# Patient Record
Sex: FEMALE | Race: WHITE | NOT HISPANIC OR LATINO | Employment: OTHER | URBAN - METROPOLITAN AREA
[De-identification: names, ages, dates, MRNs, and addresses within clinical notes are randomized per-mention and may not be internally consistent; named-entity substitution may affect disease eponyms.]

---

## 2021-01-24 PROBLEM — J90 PLEURAL EFFUSION ON RIGHT: Status: ACTIVE | Noted: 2021-01-24

## 2021-01-24 PROBLEM — Z85.3 HISTORY OF BREAST CANCER: Status: ACTIVE | Noted: 2021-01-24

## 2021-01-24 PROBLEM — J98.19 TRAPPED LUNG: Status: ACTIVE | Noted: 2021-01-24

## 2021-01-24 PROBLEM — Z71.89 ADVANCE CARE PLANNING: Status: ACTIVE | Noted: 2021-01-24

## 2021-01-24 PROBLEM — Z77.090 ASBESTOS EXPOSURE: Status: ACTIVE | Noted: 2021-01-24

## 2021-01-27 PROBLEM — J91.0 MALIGNANT PLEURAL EFFUSION: Status: ACTIVE | Noted: 2021-01-27

## 2021-01-27 PROBLEM — C78.2: Status: ACTIVE | Noted: 2021-01-27

## 2021-01-28 PROBLEM — Z98.890 S/P THORACOTOMY: Status: ACTIVE | Noted: 2021-01-28

## 2021-01-29 PROBLEM — C45.0 MALIGNANT MESOTHELIOMA OF PLEURA: Status: ACTIVE | Noted: 2021-01-29

## 2021-02-01 ENCOUNTER — TELEPHONE (OUTPATIENT)
Dept: HEMATOLOGY/ONCOLOGY | Facility: CLINIC | Age: 76
End: 2021-02-01

## 2021-02-01 DIAGNOSIS — C45.0 MESOTHELIOMA OF PLEURA: Primary | ICD-10-CM

## 2021-02-03 ENCOUNTER — TELEPHONE (OUTPATIENT)
Dept: HEMATOLOGY/ONCOLOGY | Facility: CLINIC | Age: 76
End: 2021-02-03

## 2021-02-05 ENCOUNTER — HOSPITAL ENCOUNTER (OUTPATIENT)
Dept: PULMONOLOGY | Facility: CLINIC | Age: 76
Discharge: HOME | End: 2021-02-05
Payer: MEDICARE

## 2021-02-05 ENCOUNTER — HOSPITAL ENCOUNTER (OUTPATIENT)
Dept: RADIOLOGY | Facility: HOSPITAL | Age: 76
Discharge: HOME | End: 2021-02-05
Attending: PHYSICIAN ASSISTANT
Payer: MEDICARE

## 2021-02-05 ENCOUNTER — DOCUMENTATION ONLY (OUTPATIENT)
Dept: HEMATOLOGY/ONCOLOGY | Facility: CLINIC | Age: 76
End: 2021-02-05

## 2021-02-05 ENCOUNTER — OFFICE VISIT (OUTPATIENT)
Dept: CARDIOTHORACIC SURGERY | Facility: CLINIC | Age: 76
End: 2021-02-05
Payer: MEDICARE

## 2021-02-05 VITALS
BODY MASS INDEX: 23.68 KG/M2 | HEART RATE: 117 BPM | WEIGHT: 138.69 LBS | HEIGHT: 64 IN | OXYGEN SATURATION: 94 % | SYSTOLIC BLOOD PRESSURE: 145 MMHG | DIASTOLIC BLOOD PRESSURE: 81 MMHG

## 2021-02-05 DIAGNOSIS — Z13.9 SCREENING PROCEDURE: Primary | ICD-10-CM

## 2021-02-05 DIAGNOSIS — C45.9 EPITHELIOID MESOTHELIOMA: Primary | ICD-10-CM

## 2021-02-05 DIAGNOSIS — R06.00 DYSPNEA: ICD-10-CM

## 2021-02-05 DIAGNOSIS — R06.09 DYSPNEA ON EXERTION: ICD-10-CM

## 2021-02-05 DIAGNOSIS — C45.0 MESOTHELIOMA OF PLEURA: ICD-10-CM

## 2021-02-05 LAB
POCT GLUCOSE: 109 MG/DL (ref 70–110)
SARS-COV-2 RDRP RESP QL NAA+PROBE: NEGATIVE

## 2021-02-05 PROCEDURE — 78815 PET IMAGE W/CT SKULL-THIGH: CPT | Mod: TC

## 2021-02-05 PROCEDURE — 78815 NM PET CT ROUTINE: ICD-10-PCS | Mod: 26,PI,, | Performed by: RADIOLOGY

## 2021-02-05 PROCEDURE — 1126F PR PAIN SEVERITY QUANTIFIED, NO PAIN PRESENT: ICD-10-PCS | Mod: S$GLB,,, | Performed by: THORACIC SURGERY (CARDIOTHORACIC VASCULAR SURGERY)

## 2021-02-05 PROCEDURE — 1159F MED LIST DOCD IN RCRD: CPT | Mod: S$GLB,,, | Performed by: THORACIC SURGERY (CARDIOTHORACIC VASCULAR SURGERY)

## 2021-02-05 PROCEDURE — 94729 PR C02/MEMBANE DIFFUSE CAPACITY: ICD-10-PCS | Mod: S$GLB,,, | Performed by: INTERNAL MEDICINE

## 2021-02-05 PROCEDURE — 3008F BODY MASS INDEX DOCD: CPT | Mod: CPTII,S$GLB,, | Performed by: THORACIC SURGERY (CARDIOTHORACIC VASCULAR SURGERY)

## 2021-02-05 PROCEDURE — 1126F AMNT PAIN NOTED NONE PRSNT: CPT | Mod: S$GLB,,, | Performed by: THORACIC SURGERY (CARDIOTHORACIC VASCULAR SURGERY)

## 2021-02-05 PROCEDURE — 3008F PR BODY MASS INDEX (BMI) DOCUMENTED: ICD-10-PCS | Mod: CPTII,S$GLB,, | Performed by: THORACIC SURGERY (CARDIOTHORACIC VASCULAR SURGERY)

## 2021-02-05 PROCEDURE — 99999 PR PBB SHADOW E&M-EST. PATIENT-LVL IV: CPT | Mod: PBBFAC,,, | Performed by: THORACIC SURGERY (CARDIOTHORACIC VASCULAR SURGERY)

## 2021-02-05 PROCEDURE — 3288F PR FALLS RISK ASSESSMENT DOCUMENTED: ICD-10-PCS | Mod: CPTII,S$GLB,, | Performed by: THORACIC SURGERY (CARDIOTHORACIC VASCULAR SURGERY)

## 2021-02-05 PROCEDURE — 1101F PT FALLS ASSESS-DOCD LE1/YR: CPT | Mod: CPTII,S$GLB,, | Performed by: THORACIC SURGERY (CARDIOTHORACIC VASCULAR SURGERY)

## 2021-02-05 PROCEDURE — 94060 EVALUATION OF WHEEZING: CPT | Mod: S$GLB,,, | Performed by: INTERNAL MEDICINE

## 2021-02-05 PROCEDURE — 78815 PET IMAGE W/CT SKULL-THIGH: CPT | Mod: 26,PI,, | Performed by: RADIOLOGY

## 2021-02-05 PROCEDURE — 99205 OFFICE O/P NEW HI 60 MIN: CPT | Mod: S$GLB,,, | Performed by: THORACIC SURGERY (CARDIOTHORACIC VASCULAR SURGERY)

## 2021-02-05 PROCEDURE — 3288F FALL RISK ASSESSMENT DOCD: CPT | Mod: CPTII,S$GLB,, | Performed by: THORACIC SURGERY (CARDIOTHORACIC VASCULAR SURGERY)

## 2021-02-05 PROCEDURE — 99205 PR OFFICE/OUTPT VISIT, NEW, LEVL V, 60-74 MIN: ICD-10-PCS | Mod: S$GLB,,, | Performed by: THORACIC SURGERY (CARDIOTHORACIC VASCULAR SURGERY)

## 2021-02-05 PROCEDURE — 94727 GAS DIL/WSHOT DETER LNG VOL: CPT | Mod: S$GLB,,, | Performed by: INTERNAL MEDICINE

## 2021-02-05 PROCEDURE — U0002 COVID-19 LAB TEST NON-CDC: HCPCS

## 2021-02-05 PROCEDURE — 1101F PR PT FALLS ASSESS DOC 0-1 FALLS W/OUT INJ PAST YR: ICD-10-PCS | Mod: CPTII,S$GLB,, | Performed by: THORACIC SURGERY (CARDIOTHORACIC VASCULAR SURGERY)

## 2021-02-05 PROCEDURE — 99999 PR PBB SHADOW E&M-EST. PATIENT-LVL IV: ICD-10-PCS | Mod: PBBFAC,,, | Performed by: THORACIC SURGERY (CARDIOTHORACIC VASCULAR SURGERY)

## 2021-02-05 PROCEDURE — 94060 PR EVAL OF BRONCHOSPASM: ICD-10-PCS | Mod: S$GLB,,, | Performed by: INTERNAL MEDICINE

## 2021-02-05 PROCEDURE — 94729 DIFFUSING CAPACITY: CPT | Mod: S$GLB,,, | Performed by: INTERNAL MEDICINE

## 2021-02-05 PROCEDURE — 94727 PR PULM FUNCTION TEST BY GAS: ICD-10-PCS | Mod: S$GLB,,, | Performed by: INTERNAL MEDICINE

## 2021-02-05 PROCEDURE — 1159F PR MEDICATION LIST DOCUMENTED IN MEDICAL RECORD: ICD-10-PCS | Mod: S$GLB,,, | Performed by: THORACIC SURGERY (CARDIOTHORACIC VASCULAR SURGERY)

## 2021-02-05 RX ORDER — CYCLOBENZAPRINE HCL 10 MG
10 TABLET ORAL DAILY PRN
COMMUNITY

## 2021-02-05 RX ORDER — PAROXETINE HYDROCHLORIDE 20 MG/1
20 TABLET, FILM COATED ORAL
COMMUNITY
Start: 2020-09-18 | End: 2021-02-17 | Stop reason: CLARIF

## 2021-02-08 ENCOUNTER — TELEPHONE (OUTPATIENT)
Dept: HEMATOLOGY/ONCOLOGY | Facility: CLINIC | Age: 76
End: 2021-02-08

## 2021-02-09 ENCOUNTER — HOSPITAL ENCOUNTER (OUTPATIENT)
Dept: CARDIOLOGY | Facility: HOSPITAL | Age: 76
Discharge: HOME | End: 2021-02-09
Attending: PHYSICIAN ASSISTANT
Payer: MEDICARE

## 2021-02-09 VITALS
BODY MASS INDEX: 23.56 KG/M2 | DIASTOLIC BLOOD PRESSURE: 78 MMHG | SYSTOLIC BLOOD PRESSURE: 124 MMHG | HEIGHT: 64 IN | RESPIRATION RATE: 16 BRPM | WEIGHT: 138 LBS

## 2021-02-09 DIAGNOSIS — R06.09 DYSPNEA ON EXERTION: ICD-10-CM

## 2021-02-09 DIAGNOSIS — C45.9 EPITHELIOID MESOTHELIOMA: ICD-10-CM

## 2021-02-09 LAB
ASCENDING AORTA: 2.94 CM
BSA FOR ECHO PROCEDURE: 1.68 M2
CV ECHO LV RWT: 0.34 CM
CV STRESS BASE HR: 81 BPM
DIASTOLIC BLOOD PRESSURE: 71 MMHG
DOP CALC LVOT AREA: 2.7 CM2
DOP CALC LVOT DIAMETER: 1.85 CM
DOP CALC LVOT PEAK VEL: 1.22 M/S
DOP CALC LVOT STROKE VOLUME: 58.76 CM3
DOP CALCLVOT PEAK VEL VTI: 21.87 CM
E WAVE DECELERATION TIME: 241.1 MSEC
E/A RATIO: 0.77
E/E' RATIO: 8.8 M/S
ECHO LV POSTERIOR WALL: 0.7 CM (ref 0.6–1.1)
FRACTIONAL SHORTENING: 35 % (ref 28–44)
INTERVENTRICULAR SEPTUM: 0.71 CM (ref 0.6–1.1)
IVRT: 71.36 MSEC
LA MAJOR: 4.45 CM
LA MINOR: 4.5 CM
LA WIDTH: 2.62 CM
LEFT ATRIUM SIZE: 2.74 CM
LEFT ATRIUM VOLUME INDEX: 16.4 ML/M2
LEFT ATRIUM VOLUME: 27.31 CM3
LEFT INTERNAL DIMENSION IN SYSTOLE: 2.66 CM (ref 2.1–4)
LEFT VENTRICLE DIASTOLIC VOLUME INDEX: 44.52 ML/M2
LEFT VENTRICLE DIASTOLIC VOLUME: 74.35 ML
LEFT VENTRICLE MASS INDEX: 49 G/M2
LEFT VENTRICLE SYSTOLIC VOLUME INDEX: 15.6 ML/M2
LEFT VENTRICLE SYSTOLIC VOLUME: 26.11 ML
LEFT VENTRICULAR INTERNAL DIMENSION IN DIASTOLE: 4.1 CM (ref 3.5–6)
LEFT VENTRICULAR MASS: 82.44 G
LV LATERAL E/E' RATIO: 8.25 M/S
LV SEPTAL E/E' RATIO: 9.43 M/S
MV A" WAVE DURATION": 10.56 MSEC
MV PEAK A VEL: 0.86 M/S
MV PEAK E VEL: 0.66 M/S
OHS CV CPX 1 MINUTE RECOVERY HEART RATE: 120 BPM
OHS CV CPX 85 PERCENT MAX PREDICTED HEART RATE MALE: 120
OHS CV CPX MAX PREDICTED HEART RATE: 141
OHS CV CPX PATIENT IS FEMALE: 1
OHS CV CPX PATIENT IS MALE: 0
OHS CV CPX PEAK DIASTOLIC BLOOD PRESSURE: 47 MMHG
OHS CV CPX PEAK HEAR RATE: 125 BPM
OHS CV CPX PEAK RATE PRESSURE PRODUCT: NORMAL
OHS CV CPX PEAK SYSTOLIC BLOOD PRESSURE: 166 MMHG
OHS CV CPX PERCENT MAX PREDICTED HEART RATE ACHIEVED: 89
OHS CV CPX RATE PRESSURE PRODUCT PRESENTING: 9882
PISA TR MAX VEL: 2.21 M/S
PULM VEIN S/D RATIO: 2.68
PV PEAK D VEL: 0.34 M/S
PV PEAK S VEL: 0.91 M/S
RA MAJOR: 3.89 CM
RA PRESSURE: 3 MMHG
RA WIDTH: 2.2 CM
RIGHT VENTRICULAR END-DIASTOLIC DIMENSION: 2.87 CM
RV TISSUE DOPPLER FREE WALL SYSTOLIC VELOCITY 1 (APICAL 4 CHAMBER VIEW): 8.95 CM/S
SINUS: 2.84 CM
STJ: 2.25 CM
SYSTOLIC BLOOD PRESSURE: 122 MMHG
TDI LATERAL: 0.08 M/S
TDI SEPTAL: 0.07 M/S
TDI: 0.08 M/S
TR MAX PG: 20 MMHG
TRICUSPID ANNULAR PLANE SYSTOLIC EXCURSION: 1.96 CM
TV REST PULMONARY ARTERY PRESSURE: 23 MMHG

## 2021-02-09 PROCEDURE — 93351 STRESS TTE COMPLETE: CPT

## 2021-02-09 PROCEDURE — 93351 STRESS ECHO (CUPID ONLY): ICD-10-PCS | Mod: 26,,, | Performed by: INTERNAL MEDICINE

## 2021-02-09 PROCEDURE — 63600175 PHARM REV CODE 636 W HCPCS: Performed by: PHYSICIAN ASSISTANT

## 2021-02-09 PROCEDURE — 93351 STRESS TTE COMPLETE: CPT | Mod: 26,,, | Performed by: INTERNAL MEDICINE

## 2021-02-09 RX ORDER — DOBUTAMINE HYDROCHLORIDE 400 MG/100ML
10 INJECTION INTRAVENOUS
Status: COMPLETED | OUTPATIENT
Start: 2021-02-09 | End: 2021-02-09

## 2021-02-09 RX ADMIN — DOBUTAMINE HYDROCHLORIDE 10 MCG/KG/MIN: 400 INJECTION INTRAVENOUS at 11:02

## 2021-02-10 ENCOUNTER — TELEPHONE (OUTPATIENT)
Dept: HEMATOLOGY/ONCOLOGY | Facility: CLINIC | Age: 76
End: 2021-02-10

## 2021-02-10 ENCOUNTER — DOCUMENTATION ONLY (OUTPATIENT)
Dept: CARDIOTHORACIC SURGERY | Facility: HOSPITAL | Age: 76
End: 2021-02-10

## 2021-02-10 ENCOUNTER — TELEPHONE (OUTPATIENT)
Dept: SURGERY | Facility: CLINIC | Age: 76
End: 2021-02-10

## 2021-02-10 DIAGNOSIS — C45.0 MESOTHELIOMA (PLEURAL): Primary | ICD-10-CM

## 2021-02-10 DIAGNOSIS — Z01.818 PRE-OP TESTING: ICD-10-CM

## 2021-02-11 ENCOUNTER — OFFICE VISIT (OUTPATIENT)
Dept: HEMATOLOGY/ONCOLOGY | Facility: CLINIC | Age: 76
End: 2021-02-11
Payer: MEDICARE

## 2021-02-11 VITALS
SYSTOLIC BLOOD PRESSURE: 116 MMHG | OXYGEN SATURATION: 99 % | WEIGHT: 138.69 LBS | TEMPERATURE: 98 F | RESPIRATION RATE: 18 BRPM | HEART RATE: 84 BPM | DIASTOLIC BLOOD PRESSURE: 60 MMHG | BODY MASS INDEX: 23.68 KG/M2 | HEIGHT: 64 IN

## 2021-02-11 DIAGNOSIS — R06.09 DYSPNEA ON EXERTION: ICD-10-CM

## 2021-02-11 DIAGNOSIS — C45.9 EPITHELIOID MESOTHELIOMA: ICD-10-CM

## 2021-02-11 PROCEDURE — 1159F PR MEDICATION LIST DOCUMENTED IN MEDICAL RECORD: ICD-10-PCS | Mod: S$GLB,,, | Performed by: INTERNAL MEDICINE

## 2021-02-11 PROCEDURE — 99205 OFFICE O/P NEW HI 60 MIN: CPT | Mod: S$GLB,,, | Performed by: INTERNAL MEDICINE

## 2021-02-11 PROCEDURE — 99999 PR PBB SHADOW E&M-EST. PATIENT-LVL IV: ICD-10-PCS | Mod: PBBFAC,,, | Performed by: INTERNAL MEDICINE

## 2021-02-11 PROCEDURE — 1101F PR PT FALLS ASSESS DOC 0-1 FALLS W/OUT INJ PAST YR: ICD-10-PCS | Mod: CPTII,S$GLB,, | Performed by: INTERNAL MEDICINE

## 2021-02-11 PROCEDURE — 99999 PR PBB SHADOW E&M-EST. PATIENT-LVL IV: CPT | Mod: PBBFAC,,, | Performed by: INTERNAL MEDICINE

## 2021-02-11 PROCEDURE — 3288F PR FALLS RISK ASSESSMENT DOCUMENTED: ICD-10-PCS | Mod: CPTII,S$GLB,, | Performed by: INTERNAL MEDICINE

## 2021-02-11 PROCEDURE — 3288F FALL RISK ASSESSMENT DOCD: CPT | Mod: CPTII,S$GLB,, | Performed by: INTERNAL MEDICINE

## 2021-02-11 PROCEDURE — 99205 PR OFFICE/OUTPT VISIT, NEW, LEVL V, 60-74 MIN: ICD-10-PCS | Mod: S$GLB,,, | Performed by: INTERNAL MEDICINE

## 2021-02-11 PROCEDURE — 3008F BODY MASS INDEX DOCD: CPT | Mod: CPTII,S$GLB,, | Performed by: INTERNAL MEDICINE

## 2021-02-11 PROCEDURE — 1101F PT FALLS ASSESS-DOCD LE1/YR: CPT | Mod: CPTII,S$GLB,, | Performed by: INTERNAL MEDICINE

## 2021-02-11 PROCEDURE — 3008F PR BODY MASS INDEX (BMI) DOCUMENTED: ICD-10-PCS | Mod: CPTII,S$GLB,, | Performed by: INTERNAL MEDICINE

## 2021-02-11 PROCEDURE — 1125F AMNT PAIN NOTED PAIN PRSNT: CPT | Mod: S$GLB,,, | Performed by: INTERNAL MEDICINE

## 2021-02-11 PROCEDURE — 1125F PR PAIN SEVERITY QUANTIFIED, PAIN PRESENT: ICD-10-PCS | Mod: S$GLB,,, | Performed by: INTERNAL MEDICINE

## 2021-02-11 PROCEDURE — 1159F MED LIST DOCD IN RCRD: CPT | Mod: S$GLB,,, | Performed by: INTERNAL MEDICINE

## 2021-02-12 ENCOUNTER — TELEPHONE (OUTPATIENT)
Dept: VASCULAR SURGERY | Facility: CLINIC | Age: 76
End: 2021-02-12

## 2021-02-17 ENCOUNTER — ANESTHESIA EVENT (OUTPATIENT)
Dept: SURGERY | Facility: HOSPITAL | Age: 76
DRG: 988 | End: 2021-02-17
Payer: MEDICARE

## 2021-02-18 ENCOUNTER — HOSPITAL ENCOUNTER (INPATIENT)
Facility: HOSPITAL | Age: 76
LOS: 1 days | Discharge: HOME | DRG: 988 | End: 2021-02-18
Attending: THORACIC SURGERY (CARDIOTHORACIC VASCULAR SURGERY) | Admitting: THORACIC SURGERY (CARDIOTHORACIC VASCULAR SURGERY)
Payer: MEDICARE

## 2021-02-18 ENCOUNTER — ANESTHESIA (OUTPATIENT)
Dept: SURGERY | Facility: HOSPITAL | Age: 76
DRG: 988 | End: 2021-02-18
Payer: MEDICARE

## 2021-02-18 VITALS
WEIGHT: 139 LBS | BODY MASS INDEX: 26.24 KG/M2 | HEIGHT: 61 IN | RESPIRATION RATE: 18 BRPM | SYSTOLIC BLOOD PRESSURE: 145 MMHG | DIASTOLIC BLOOD PRESSURE: 75 MMHG | TEMPERATURE: 98 F | HEART RATE: 79 BPM | OXYGEN SATURATION: 96 %

## 2021-02-18 DIAGNOSIS — C45.9 EPITHELIOID MESOTHELIOMA: Primary | ICD-10-CM

## 2021-02-18 DIAGNOSIS — C45.9 MESOTHELIOMA: ICD-10-CM

## 2021-02-18 LAB
ABO + RH BLD: NORMAL
BLD GP AB SCN CELLS X3 SERPL QL: NORMAL

## 2021-02-18 PROCEDURE — 63600175 PHARM REV CODE 636 W HCPCS: Performed by: THORACIC SURGERY (CARDIOTHORACIC VASCULAR SURGERY)

## 2021-02-18 PROCEDURE — 36561 INSERT TUNNELED CV CATH: CPT | Mod: RT,,, | Performed by: THORACIC SURGERY (CARDIOTHORACIC VASCULAR SURGERY)

## 2021-02-18 PROCEDURE — 63600175 PHARM REV CODE 636 W HCPCS

## 2021-02-18 PROCEDURE — 86900 BLOOD TYPING SEROLOGIC ABO: CPT

## 2021-02-18 PROCEDURE — 63600175 PHARM REV CODE 636 W HCPCS: Performed by: STUDENT IN AN ORGANIZED HEALTH CARE EDUCATION/TRAINING PROGRAM

## 2021-02-18 PROCEDURE — D9220A PRA ANESTHESIA: Mod: ,,, | Performed by: ANESTHESIOLOGY

## 2021-02-18 PROCEDURE — 25000003 PHARM REV CODE 250: Performed by: STUDENT IN AN ORGANIZED HEALTH CARE EDUCATION/TRAINING PROGRAM

## 2021-02-18 PROCEDURE — 36000708 HC OR TIME LEV III 1ST 15 MIN: Performed by: THORACIC SURGERY (CARDIOTHORACIC VASCULAR SURGERY)

## 2021-02-18 PROCEDURE — 36561 PR INSERT TUNNELED CV CATH WITH PORT: ICD-10-PCS | Mod: RT,,, | Performed by: THORACIC SURGERY (CARDIOTHORACIC VASCULAR SURGERY)

## 2021-02-18 PROCEDURE — 77001 CHG FLUOROGUIDE CNTRL VEN ACCESS,PLACE,REPLACE,REMOVE: ICD-10-PCS | Mod: 26,59,, | Performed by: THORACIC SURGERY (CARDIOTHORACIC VASCULAR SURGERY)

## 2021-02-18 PROCEDURE — D9220A PRA ANESTHESIA: ICD-10-PCS | Mod: ,,, | Performed by: ANESTHESIOLOGY

## 2021-02-18 PROCEDURE — 77001 FLUOROGUIDE FOR VEIN DEVICE: CPT | Mod: 26,59,, | Performed by: THORACIC SURGERY (CARDIOTHORACIC VASCULAR SURGERY)

## 2021-02-18 PROCEDURE — 37000008 HC ANESTHESIA 1ST 15 MINUTES: Performed by: THORACIC SURGERY (CARDIOTHORACIC VASCULAR SURGERY)

## 2021-02-18 PROCEDURE — 49320 PR LAP,DIAGNOSTIC ABDOMEN: ICD-10-PCS | Mod: 51,,, | Performed by: THORACIC SURGERY (CARDIOTHORACIC VASCULAR SURGERY)

## 2021-02-18 PROCEDURE — 25000003 PHARM REV CODE 250: Performed by: THORACIC SURGERY (CARDIOTHORACIC VASCULAR SURGERY)

## 2021-02-18 PROCEDURE — C1788 PORT, INDWELLING, IMP: HCPCS | Performed by: THORACIC SURGERY (CARDIOTHORACIC VASCULAR SURGERY)

## 2021-02-18 PROCEDURE — 36000709 HC OR TIME LEV III EA ADD 15 MIN: Performed by: THORACIC SURGERY (CARDIOTHORACIC VASCULAR SURGERY)

## 2021-02-18 PROCEDURE — 49320 DIAG LAPARO SEPARATE PROC: CPT | Mod: 51,,, | Performed by: THORACIC SURGERY (CARDIOTHORACIC VASCULAR SURGERY)

## 2021-02-18 PROCEDURE — 71000033 HC RECOVERY, INTIAL HOUR: Performed by: THORACIC SURGERY (CARDIOTHORACIC VASCULAR SURGERY)

## 2021-02-18 PROCEDURE — 94761 N-INVAS EAR/PLS OXIMETRY MLT: CPT

## 2021-02-18 PROCEDURE — 71000015 HC POSTOP RECOV 1ST HR: Performed by: THORACIC SURGERY (CARDIOTHORACIC VASCULAR SURGERY)

## 2021-02-18 PROCEDURE — 12000002 HC ACUTE/MED SURGE SEMI-PRIVATE ROOM

## 2021-02-18 PROCEDURE — 37000009 HC ANESTHESIA EA ADD 15 MINS: Performed by: THORACIC SURGERY (CARDIOTHORACIC VASCULAR SURGERY)

## 2021-02-18 DEVICE — KIT POWERPORT SINGLE 8FR: Type: IMPLANTABLE DEVICE | Site: NECK | Status: FUNCTIONAL

## 2021-02-18 RX ORDER — KETAMINE HCL IN 0.9 % NACL 50 MG/5 ML
SYRINGE (ML) INTRAVENOUS
Status: DISCONTINUED | OUTPATIENT
Start: 2021-02-18 | End: 2021-02-18

## 2021-02-18 RX ORDER — CEFAZOLIN SODIUM 1 G/3ML
INJECTION, POWDER, FOR SOLUTION INTRAMUSCULAR; INTRAVENOUS
Status: DISCONTINUED | OUTPATIENT
Start: 2021-02-18 | End: 2021-02-18

## 2021-02-18 RX ORDER — HYDROMORPHONE HYDROCHLORIDE 1 MG/ML
0.2 INJECTION, SOLUTION INTRAMUSCULAR; INTRAVENOUS; SUBCUTANEOUS EVERY 5 MIN PRN
Status: DISCONTINUED | OUTPATIENT
Start: 2021-02-18 | End: 2021-02-18 | Stop reason: HOSPADM

## 2021-02-18 RX ORDER — HYDROMORPHONE HYDROCHLORIDE 1 MG/ML
INJECTION, SOLUTION INTRAMUSCULAR; INTRAVENOUS; SUBCUTANEOUS
Status: COMPLETED
Start: 2021-02-18 | End: 2021-02-18

## 2021-02-18 RX ORDER — ACETAMINOPHEN 10 MG/ML
INJECTION, SOLUTION INTRAVENOUS
Status: DISCONTINUED | OUTPATIENT
Start: 2021-02-18 | End: 2021-02-18

## 2021-02-18 RX ORDER — HEPARIN SODIUM (PORCINE) LOCK FLUSH IV SOLN 100 UNIT/ML 100 UNIT/ML
SOLUTION INTRAVENOUS
Status: DISCONTINUED | OUTPATIENT
Start: 2021-02-18 | End: 2021-02-18 | Stop reason: HOSPADM

## 2021-02-18 RX ORDER — SODIUM CHLORIDE 0.9 % (FLUSH) 0.9 %
3 SYRINGE (ML) INJECTION
Status: DISCONTINUED | OUTPATIENT
Start: 2021-02-18 | End: 2021-02-18 | Stop reason: HOSPADM

## 2021-02-18 RX ORDER — LIDOCAINE HYDROCHLORIDE 10 MG/ML
1 INJECTION, SOLUTION EPIDURAL; INFILTRATION; INTRACAUDAL; PERINEURAL ONCE
Status: COMPLETED | OUTPATIENT
Start: 2021-02-18 | End: 2021-02-18

## 2021-02-18 RX ORDER — HYDROCODONE BITARTRATE AND ACETAMINOPHEN 7.5; 325 MG/1; MG/1
1 TABLET ORAL EVERY 6 HOURS PRN
Qty: 15 TABLET | Refills: 0 | Status: SHIPPED | OUTPATIENT
Start: 2021-02-18 | End: 2021-03-31 | Stop reason: SDUPTHER

## 2021-02-18 RX ORDER — SODIUM CHLORIDE 9 MG/ML
INJECTION, SOLUTION INTRAVENOUS CONTINUOUS
Status: DISCONTINUED | OUTPATIENT
Start: 2021-02-18 | End: 2021-02-18 | Stop reason: HOSPADM

## 2021-02-18 RX ORDER — PROPOFOL 10 MG/ML
VIAL (ML) INTRAVENOUS
Status: DISCONTINUED | OUTPATIENT
Start: 2021-02-18 | End: 2021-02-18

## 2021-02-18 RX ORDER — LANOLIN ALCOHOL/MO/W.PET/CERES
CREAM (GRAM) TOPICAL DAILY
Status: ON HOLD | COMMUNITY
End: 2021-05-25 | Stop reason: HOSPADM

## 2021-02-18 RX ORDER — HYDROCODONE BITARTRATE AND ACETAMINOPHEN 7.5; 325 MG/1; MG/1
1 TABLET ORAL ONCE AS NEEDED
Status: COMPLETED | OUTPATIENT
Start: 2021-02-18 | End: 2021-02-18

## 2021-02-18 RX ORDER — ROCURONIUM BROMIDE 10 MG/ML
INJECTION, SOLUTION INTRAVENOUS
Status: DISCONTINUED | OUTPATIENT
Start: 2021-02-18 | End: 2021-02-18

## 2021-02-18 RX ORDER — BUPIVACAINE HYDROCHLORIDE 5 MG/ML
INJECTION, SOLUTION EPIDURAL; INTRACAUDAL
Status: DISCONTINUED | OUTPATIENT
Start: 2021-02-18 | End: 2021-02-18 | Stop reason: HOSPADM

## 2021-02-18 RX ORDER — HYDROCODONE BITARTRATE AND ACETAMINOPHEN 7.5; 325 MG/15ML; MG/15ML
15 SOLUTION ORAL ONCE AS NEEDED
Status: DISCONTINUED | OUTPATIENT
Start: 2021-02-18 | End: 2021-02-18

## 2021-02-18 RX ORDER — CEFAZOLIN SODIUM 1 G/3ML
2 INJECTION, POWDER, FOR SOLUTION INTRAMUSCULAR; INTRAVENOUS
Status: ACTIVE | OUTPATIENT
Start: 2021-02-18

## 2021-02-18 RX ORDER — GABAPENTIN 300 MG/1
300 CAPSULE ORAL 2 TIMES DAILY
Qty: 60 CAPSULE | Refills: 11 | Status: SHIPPED | OUTPATIENT
Start: 2021-02-18 | End: 2022-05-17 | Stop reason: SDUPTHER

## 2021-02-18 RX ORDER — FENTANYL CITRATE 50 UG/ML
INJECTION, SOLUTION INTRAMUSCULAR; INTRAVENOUS
Status: DISCONTINUED | OUTPATIENT
Start: 2021-02-18 | End: 2021-02-18

## 2021-02-18 RX ORDER — LIDOCAINE HCL/PF 100 MG/5ML
SYRINGE (ML) INTRAVENOUS
Status: DISCONTINUED | OUTPATIENT
Start: 2021-02-18 | End: 2021-02-18

## 2021-02-18 RX ORDER — DEXAMETHASONE SODIUM PHOSPHATE 4 MG/ML
INJECTION, SOLUTION INTRA-ARTICULAR; INTRALESIONAL; INTRAMUSCULAR; INTRAVENOUS; SOFT TISSUE
Status: DISCONTINUED | OUTPATIENT
Start: 2021-02-18 | End: 2021-02-18

## 2021-02-18 RX ADMIN — ROCURONIUM BROMIDE 50 MG: 10 INJECTION, SOLUTION INTRAVENOUS at 11:02

## 2021-02-18 RX ADMIN — HYDROMORPHONE HYDROCHLORIDE 0.2 MG: 1 INJECTION, SOLUTION INTRAMUSCULAR; INTRAVENOUS; SUBCUTANEOUS at 12:02

## 2021-02-18 RX ADMIN — PROPOFOL 150 MG: 10 INJECTION, EMULSION INTRAVENOUS at 11:02

## 2021-02-18 RX ADMIN — LIDOCAINE HYDROCHLORIDE 0.3 MG: 10 INJECTION, SOLUTION EPIDURAL; INFILTRATION; INTRACAUDAL at 08:02

## 2021-02-18 RX ADMIN — DEXAMETHASONE SODIUM PHOSPHATE 4 MG: 4 INJECTION INTRA-ARTICULAR; INTRALESIONAL; INTRAMUSCULAR; INTRAVENOUS; SOFT TISSUE at 11:02

## 2021-02-18 RX ADMIN — SODIUM CHLORIDE: 0.9 INJECTION, SOLUTION INTRAVENOUS at 08:02

## 2021-02-18 RX ADMIN — ACETAMINOPHEN 1000 MG: 10 INJECTION, SOLUTION INTRAVENOUS at 11:02

## 2021-02-18 RX ADMIN — LIDOCAINE HYDROCHLORIDE 50 MG: 20 INJECTION, SOLUTION INTRAVENOUS at 11:02

## 2021-02-18 RX ADMIN — Medication 20 MG: at 11:02

## 2021-02-18 RX ADMIN — SODIUM CHLORIDE: 0.9 INJECTION, SOLUTION INTRAVENOUS at 11:02

## 2021-02-18 RX ADMIN — HYDROMORPHONE HYDROCHLORIDE 0.2 MG: 1 INJECTION, SOLUTION INTRAMUSCULAR; INTRAVENOUS; SUBCUTANEOUS at 01:02

## 2021-02-18 RX ADMIN — CEFAZOLIN 2 G: 330 INJECTION, POWDER, FOR SOLUTION INTRAMUSCULAR; INTRAVENOUS at 11:02

## 2021-02-18 RX ADMIN — FENTANYL CITRATE 25 MCG: 50 INJECTION, SOLUTION INTRAMUSCULAR; INTRAVENOUS at 10:02

## 2021-02-18 RX ADMIN — SUGAMMADEX 200 MG: 100 INJECTION, SOLUTION INTRAVENOUS at 12:02

## 2021-02-18 RX ADMIN — HYDROCODONE BITARTRATE AND ACETAMINOPHEN 1 TABLET: 7.5; 325 TABLET ORAL at 01:02

## 2021-02-22 ENCOUNTER — PATIENT MESSAGE (OUTPATIENT)
Dept: HEMATOLOGY/ONCOLOGY | Facility: CLINIC | Age: 76
End: 2021-02-22

## 2021-02-23 ENCOUNTER — PATIENT MESSAGE (OUTPATIENT)
Dept: CARDIOTHORACIC SURGERY | Facility: CLINIC | Age: 76
End: 2021-02-23

## 2021-03-02 ENCOUNTER — OFFICE VISIT (OUTPATIENT)
Dept: HEMATOLOGY/ONCOLOGY | Facility: CLINIC | Age: 76
End: 2021-03-02
Payer: MEDICARE

## 2021-03-02 ENCOUNTER — EXTERNAL HOME HEALTH (OUTPATIENT)
Dept: HOME HEALTH SERVICES | Facility: HOSPITAL | Age: 76
End: 2021-03-02
Payer: MEDICARE

## 2021-03-02 VITALS
BODY MASS INDEX: 26.22 KG/M2 | WEIGHT: 138.88 LBS | OXYGEN SATURATION: 99 % | TEMPERATURE: 96 F | SYSTOLIC BLOOD PRESSURE: 119 MMHG | DIASTOLIC BLOOD PRESSURE: 71 MMHG | HEART RATE: 99 BPM | RESPIRATION RATE: 18 BRPM | HEIGHT: 61 IN

## 2021-03-02 DIAGNOSIS — C45.9 EPITHELIOID MESOTHELIOMA: Primary | ICD-10-CM

## 2021-03-02 PROCEDURE — 99215 OFFICE O/P EST HI 40 MIN: CPT | Mod: S$GLB,,, | Performed by: INTERNAL MEDICINE

## 2021-03-02 PROCEDURE — 3008F PR BODY MASS INDEX (BMI) DOCUMENTED: ICD-10-PCS | Mod: CPTII,S$GLB,, | Performed by: INTERNAL MEDICINE

## 2021-03-02 PROCEDURE — 99999 PR PBB SHADOW E&M-EST. PATIENT-LVL IV: ICD-10-PCS | Mod: PBBFAC,,, | Performed by: INTERNAL MEDICINE

## 2021-03-02 PROCEDURE — 99999 PR PBB SHADOW E&M-EST. PATIENT-LVL IV: CPT | Mod: PBBFAC,,, | Performed by: INTERNAL MEDICINE

## 2021-03-02 PROCEDURE — 3008F BODY MASS INDEX DOCD: CPT | Mod: CPTII,S$GLB,, | Performed by: INTERNAL MEDICINE

## 2021-03-02 PROCEDURE — 1101F PT FALLS ASSESS-DOCD LE1/YR: CPT | Mod: CPTII,S$GLB,, | Performed by: INTERNAL MEDICINE

## 2021-03-02 PROCEDURE — 1101F PR PT FALLS ASSESS DOC 0-1 FALLS W/OUT INJ PAST YR: ICD-10-PCS | Mod: CPTII,S$GLB,, | Performed by: INTERNAL MEDICINE

## 2021-03-02 PROCEDURE — 1125F PR PAIN SEVERITY QUANTIFIED, PAIN PRESENT: ICD-10-PCS | Mod: S$GLB,,, | Performed by: INTERNAL MEDICINE

## 2021-03-02 PROCEDURE — 99215 PR OFFICE/OUTPT VISIT, EST, LEVL V, 40-54 MIN: ICD-10-PCS | Mod: S$GLB,,, | Performed by: INTERNAL MEDICINE

## 2021-03-02 PROCEDURE — 1159F PR MEDICATION LIST DOCUMENTED IN MEDICAL RECORD: ICD-10-PCS | Mod: S$GLB,,, | Performed by: INTERNAL MEDICINE

## 2021-03-02 PROCEDURE — 1159F MED LIST DOCD IN RCRD: CPT | Mod: S$GLB,,, | Performed by: INTERNAL MEDICINE

## 2021-03-02 PROCEDURE — 3288F PR FALLS RISK ASSESSMENT DOCUMENTED: ICD-10-PCS | Mod: CPTII,S$GLB,, | Performed by: INTERNAL MEDICINE

## 2021-03-02 PROCEDURE — 3288F FALL RISK ASSESSMENT DOCD: CPT | Mod: CPTII,S$GLB,, | Performed by: INTERNAL MEDICINE

## 2021-03-02 PROCEDURE — 1125F AMNT PAIN NOTED PAIN PRSNT: CPT | Mod: S$GLB,,, | Performed by: INTERNAL MEDICINE

## 2021-03-02 RX ORDER — FOLIC ACID 1 MG/1
1 TABLET ORAL DAILY
Qty: 100 TABLET | Refills: 3 | Status: SHIPPED | OUTPATIENT
Start: 2021-03-02 | End: 2022-07-07 | Stop reason: SDUPTHER

## 2021-03-02 RX ORDER — HEPARIN 100 UNIT/ML
500 SYRINGE INTRAVENOUS
Status: CANCELLED | OUTPATIENT
Start: 2021-03-10

## 2021-03-02 RX ORDER — SODIUM CHLORIDE 0.9 % (FLUSH) 0.9 %
10 SYRINGE (ML) INJECTION
Status: CANCELLED | OUTPATIENT
Start: 2021-03-10

## 2021-03-02 RX ORDER — DEXAMETHASONE SODIUM PHOSPHATE 4 MG/ML
20 INJECTION, SOLUTION INTRA-ARTICULAR; INTRALESIONAL; INTRAMUSCULAR; INTRAVENOUS; SOFT TISSUE
Status: CANCELLED
Start: 2021-03-09

## 2021-03-02 RX ORDER — PALONOSETRON 0.05 MG/ML
0.25 INJECTION, SOLUTION INTRAVENOUS ONCE
Status: CANCELLED
Start: 2021-03-09 | End: 2021-03-09

## 2021-03-08 ENCOUNTER — CLINICAL SUPPORT (OUTPATIENT)
Dept: HEMATOLOGY/ONCOLOGY | Facility: CLINIC | Age: 76
End: 2021-03-08
Payer: MEDICARE

## 2021-03-08 ENCOUNTER — DOCUMENTATION ONLY (OUTPATIENT)
Dept: INFUSION THERAPY | Facility: HOSPITAL | Age: 76
End: 2021-03-08

## 2021-03-08 VITALS
HEART RATE: 88 BPM | TEMPERATURE: 99 F | SYSTOLIC BLOOD PRESSURE: 112 MMHG | RESPIRATION RATE: 18 BRPM | BODY MASS INDEX: 26.1 KG/M2 | DIASTOLIC BLOOD PRESSURE: 57 MMHG | OXYGEN SATURATION: 98 % | HEIGHT: 61 IN | WEIGHT: 138.25 LBS

## 2021-03-08 DIAGNOSIS — C45.0 MALIGNANT MESOTHELIOMA OF PLEURA: Primary | ICD-10-CM

## 2021-03-08 PROCEDURE — 99214 OFFICE O/P EST MOD 30 MIN: CPT | Mod: S$GLB,,, | Performed by: NURSE PRACTITIONER

## 2021-03-08 PROCEDURE — 99999 PR PBB SHADOW E&M-EST. PATIENT-LVL IV: CPT | Mod: PBBFAC,,, | Performed by: NURSE PRACTITIONER

## 2021-03-08 PROCEDURE — 99999 PR PBB SHADOW E&M-EST. PATIENT-LVL IV: ICD-10-PCS | Mod: PBBFAC,,, | Performed by: NURSE PRACTITIONER

## 2021-03-08 PROCEDURE — 99214 PR OFFICE/OUTPT VISIT, EST, LEVL IV, 30-39 MIN: ICD-10-PCS | Mod: S$GLB,,, | Performed by: NURSE PRACTITIONER

## 2021-03-08 RX ORDER — HYDROCHLOROTHIAZIDE 12.5 MG/1
CAPSULE ORAL
Status: ON HOLD | COMMUNITY
Start: 2021-03-08 | End: 2021-05-25 | Stop reason: HOSPADM

## 2021-03-08 RX ORDER — PROCHLORPERAZINE MALEATE 10 MG
10 TABLET ORAL EVERY 6 HOURS PRN
Qty: 30 TABLET | Refills: 6 | Status: SHIPPED | OUTPATIENT
Start: 2021-03-08 | End: 2022-03-08

## 2021-03-08 RX ORDER — LIDOCAINE AND PRILOCAINE 25; 25 MG/G; MG/G
CREAM TOPICAL
Qty: 30 G | Refills: 2 | Status: SHIPPED | OUTPATIENT
Start: 2021-03-08

## 2021-03-09 ENCOUNTER — DOCUMENTATION ONLY (OUTPATIENT)
Dept: INFUSION THERAPY | Facility: HOSPITAL | Age: 76
End: 2021-03-09

## 2021-03-09 ENCOUNTER — NUTRITION (OUTPATIENT)
Dept: INFUSION THERAPY | Facility: HOSPITAL | Age: 76
End: 2021-03-09
Attending: INTERNAL MEDICINE
Payer: MEDICARE

## 2021-03-09 DIAGNOSIS — C45.0 MALIGNANT MESOTHELIOMA OF PLEURA: Primary | ICD-10-CM

## 2021-03-10 ENCOUNTER — DOCUMENTATION ONLY (OUTPATIENT)
Dept: INFUSION THERAPY | Facility: HOSPITAL | Age: 76
End: 2021-03-10

## 2021-03-10 ENCOUNTER — INFUSION (OUTPATIENT)
Dept: INFUSION THERAPY | Facility: HOSPITAL | Age: 76
End: 2021-03-10
Attending: INTERNAL MEDICINE
Payer: MEDICARE

## 2021-03-10 ENCOUNTER — OFFICE VISIT (OUTPATIENT)
Dept: HEMATOLOGY/ONCOLOGY | Facility: CLINIC | Age: 76
End: 2021-03-10
Payer: MEDICARE

## 2021-03-10 VITALS
SYSTOLIC BLOOD PRESSURE: 92 MMHG | OXYGEN SATURATION: 98 % | RESPIRATION RATE: 18 BRPM | BODY MASS INDEX: 25.89 KG/M2 | TEMPERATURE: 97 F | HEART RATE: 100 BPM | HEIGHT: 61 IN | DIASTOLIC BLOOD PRESSURE: 59 MMHG | WEIGHT: 137.13 LBS

## 2021-03-10 VITALS
SYSTOLIC BLOOD PRESSURE: 116 MMHG | HEIGHT: 61 IN | RESPIRATION RATE: 18 BRPM | OXYGEN SATURATION: 98 % | DIASTOLIC BLOOD PRESSURE: 72 MMHG | BODY MASS INDEX: 25.89 KG/M2 | TEMPERATURE: 97 F | WEIGHT: 137.13 LBS | HEART RATE: 94 BPM

## 2021-03-10 DIAGNOSIS — C45.0 MALIGNANT MESOTHELIOMA OF PLEURA: Primary | ICD-10-CM

## 2021-03-10 PROCEDURE — 1159F PR MEDICATION LIST DOCUMENTED IN MEDICAL RECORD: ICD-10-PCS | Mod: S$GLB,,, | Performed by: INTERNAL MEDICINE

## 2021-03-10 PROCEDURE — 99214 PR OFFICE/OUTPT VISIT, EST, LEVL IV, 30-39 MIN: ICD-10-PCS | Mod: S$GLB,,, | Performed by: INTERNAL MEDICINE

## 2021-03-10 PROCEDURE — 96367 TX/PROPH/DG ADDL SEQ IV INF: CPT | Mod: PN

## 2021-03-10 PROCEDURE — 99999 PR PBB SHADOW E&M-EST. PATIENT-LVL IV: ICD-10-PCS | Mod: PBBFAC,,, | Performed by: INTERNAL MEDICINE

## 2021-03-10 PROCEDURE — 96366 THER/PROPH/DIAG IV INF ADDON: CPT | Mod: PN

## 2021-03-10 PROCEDURE — 25000003 PHARM REV CODE 250: Mod: PN | Performed by: INTERNAL MEDICINE

## 2021-03-10 PROCEDURE — 96413 CHEMO IV INFUSION 1 HR: CPT | Mod: PN

## 2021-03-10 PROCEDURE — 99214 OFFICE O/P EST MOD 30 MIN: CPT | Mod: S$GLB,,, | Performed by: INTERNAL MEDICINE

## 2021-03-10 PROCEDURE — 3288F FALL RISK ASSESSMENT DOCD: CPT | Mod: CPTII,S$GLB,, | Performed by: INTERNAL MEDICINE

## 2021-03-10 PROCEDURE — 1126F PR PAIN SEVERITY QUANTIFIED, NO PAIN PRESENT: ICD-10-PCS | Mod: S$GLB,,, | Performed by: INTERNAL MEDICINE

## 2021-03-10 PROCEDURE — 96375 TX/PRO/DX INJ NEW DRUG ADDON: CPT | Mod: PN

## 2021-03-10 PROCEDURE — 99999 PR PBB SHADOW E&M-EST. PATIENT-LVL IV: CPT | Mod: PBBFAC,,, | Performed by: INTERNAL MEDICINE

## 2021-03-10 PROCEDURE — 96411 CHEMO IV PUSH ADDL DRUG: CPT | Mod: PN

## 2021-03-10 PROCEDURE — 1126F AMNT PAIN NOTED NONE PRSNT: CPT | Mod: S$GLB,,, | Performed by: INTERNAL MEDICINE

## 2021-03-10 PROCEDURE — 63600175 PHARM REV CODE 636 W HCPCS: Mod: PN | Performed by: INTERNAL MEDICINE

## 2021-03-10 PROCEDURE — 1101F PR PT FALLS ASSESS DOC 0-1 FALLS W/OUT INJ PAST YR: ICD-10-PCS | Mod: CPTII,S$GLB,, | Performed by: INTERNAL MEDICINE

## 2021-03-10 PROCEDURE — A4216 STERILE WATER/SALINE, 10 ML: HCPCS | Mod: PN | Performed by: INTERNAL MEDICINE

## 2021-03-10 PROCEDURE — 3008F BODY MASS INDEX DOCD: CPT | Mod: CPTII,S$GLB,, | Performed by: INTERNAL MEDICINE

## 2021-03-10 PROCEDURE — 3008F PR BODY MASS INDEX (BMI) DOCUMENTED: ICD-10-PCS | Mod: CPTII,S$GLB,, | Performed by: INTERNAL MEDICINE

## 2021-03-10 PROCEDURE — 1159F MED LIST DOCD IN RCRD: CPT | Mod: S$GLB,,, | Performed by: INTERNAL MEDICINE

## 2021-03-10 PROCEDURE — 1101F PT FALLS ASSESS-DOCD LE1/YR: CPT | Mod: CPTII,S$GLB,, | Performed by: INTERNAL MEDICINE

## 2021-03-10 PROCEDURE — 3288F PR FALLS RISK ASSESSMENT DOCUMENTED: ICD-10-PCS | Mod: CPTII,S$GLB,, | Performed by: INTERNAL MEDICINE

## 2021-03-10 RX ORDER — SODIUM CHLORIDE 0.9 % (FLUSH) 0.9 %
10 SYRINGE (ML) INJECTION
Status: DISCONTINUED | OUTPATIENT
Start: 2021-03-10 | End: 2021-03-10 | Stop reason: HOSPADM

## 2021-03-10 RX ORDER — LORAZEPAM 2 MG/ML
1 INJECTION INTRAMUSCULAR ONCE
Status: COMPLETED | OUTPATIENT
Start: 2021-03-10 | End: 2021-03-10

## 2021-03-10 RX ORDER — HEPARIN 100 UNIT/ML
500 SYRINGE INTRAVENOUS
Status: DISCONTINUED | OUTPATIENT
Start: 2021-03-10 | End: 2021-03-10 | Stop reason: HOSPADM

## 2021-03-10 RX ADMIN — POTASSIUM CHLORIDE: 2 INJECTION, SOLUTION, CONCENTRATE INTRAVENOUS at 09:03

## 2021-03-10 RX ADMIN — POTASSIUM CHLORIDE: 2 INJECTION, SOLUTION, CONCENTRATE INTRAVENOUS at 01:03

## 2021-03-10 RX ADMIN — DEXAMETHASONE SODIUM PHOSPHATE 0.25 MG: 4 INJECTION, SOLUTION INTRA-ARTICULAR; INTRALESIONAL; INTRAMUSCULAR; INTRAVENOUS; SOFT TISSUE at 11:03

## 2021-03-10 RX ADMIN — HEPARIN 500 UNITS: 100 SYRINGE at 03:03

## 2021-03-10 RX ADMIN — MANNITOL 12.5 G: 12.5 INJECTION, SOLUTION INTRAVENOUS at 12:03

## 2021-03-10 RX ADMIN — LORAZEPAM 1 MG: 2 INJECTION INTRAMUSCULAR; INTRAVENOUS at 09:03

## 2021-03-10 RX ADMIN — APREPITANT 130 MG: 130 INJECTION, EMULSION INTRAVENOUS at 12:03

## 2021-03-10 RX ADMIN — SODIUM CHLORIDE 800 MG: 9 INJECTION, SOLUTION INTRAVENOUS at 12:03

## 2021-03-10 RX ADMIN — CISPLATIN 124 MG: 1 INJECTION INTRAVENOUS at 01:03

## 2021-03-10 RX ADMIN — Medication 10 ML: at 09:03

## 2021-03-23 ENCOUNTER — PATIENT MESSAGE (OUTPATIENT)
Dept: HEMATOLOGY/ONCOLOGY | Facility: CLINIC | Age: 76
End: 2021-03-23

## 2021-03-24 ENCOUNTER — TELEPHONE (OUTPATIENT)
Dept: VASCULAR SURGERY | Facility: CLINIC | Age: 76
End: 2021-03-24

## 2021-03-24 DIAGNOSIS — F51.02 ADJUSTMENT INSOMNIA: Primary | ICD-10-CM

## 2021-03-24 RX ORDER — TEMAZEPAM 15 MG/1
15-30 CAPSULE ORAL NIGHTLY PRN
Qty: 60 CAPSULE | Refills: 0 | Status: SHIPPED | OUTPATIENT
Start: 2021-03-24 | End: 2021-04-23

## 2021-03-30 ENCOUNTER — LAB VISIT (OUTPATIENT)
Dept: LAB | Facility: HOSPITAL | Age: 76
End: 2021-03-30
Attending: FAMILY MEDICINE
Payer: MEDICARE

## 2021-03-30 DIAGNOSIS — C45.0 MALIGNANT MESOTHELIOMA OF PLEURA: ICD-10-CM

## 2021-03-30 LAB
ALBUMIN SERPL BCP-MCNC: 3 G/DL (ref 3.5–5.2)
ALP SERPL-CCNC: 98 U/L (ref 55–135)
ALT SERPL W/O P-5'-P-CCNC: 12 U/L (ref 10–44)
ANION GAP SERPL CALC-SCNC: 11 MMOL/L (ref 8–16)
AST SERPL-CCNC: 17 U/L (ref 10–40)
BASOPHILS # BLD AUTO: 0.03 K/UL (ref 0–0.2)
BASOPHILS NFR BLD: 0.6 % (ref 0–1.9)
BILIRUB SERPL-MCNC: 0.3 MG/DL (ref 0.1–1)
BUN SERPL-MCNC: 11 MG/DL (ref 8–23)
CALCIUM SERPL-MCNC: 9.4 MG/DL (ref 8.7–10.5)
CHLORIDE SERPL-SCNC: 100 MMOL/L (ref 95–110)
CO2 SERPL-SCNC: 30 MMOL/L (ref 23–29)
CREAT SERPL-MCNC: 0.8 MG/DL (ref 0.5–1.4)
DIFFERENTIAL METHOD: ABNORMAL
EOSINOPHIL # BLD AUTO: 0.1 K/UL (ref 0–0.5)
EOSINOPHIL NFR BLD: 1.4 % (ref 0–8)
ERYTHROCYTE [DISTWIDTH] IN BLOOD BY AUTOMATED COUNT: 13 % (ref 11.5–14.5)
EST. GFR  (AFRICAN AMERICAN): >60 ML/MIN/1.73 M^2
EST. GFR  (NON AFRICAN AMERICAN): >60 ML/MIN/1.73 M^2
GLUCOSE SERPL-MCNC: 108 MG/DL (ref 70–110)
HCT VFR BLD AUTO: 40.2 % (ref 37–48.5)
HGB BLD-MCNC: 13.4 G/DL (ref 12–16)
IMM GRANULOCYTES # BLD AUTO: 0.01 K/UL (ref 0–0.04)
IMM GRANULOCYTES NFR BLD AUTO: 0.2 % (ref 0–0.5)
LDH SERPL L TO P-CCNC: 217 U/L (ref 110–260)
LYMPHOCYTES # BLD AUTO: 1.1 K/UL (ref 1–4.8)
LYMPHOCYTES NFR BLD: 22.9 % (ref 18–48)
MAGNESIUM SERPL-MCNC: 1.8 MG/DL (ref 1.6–2.6)
MCH RBC QN AUTO: 30.8 PG (ref 27–31)
MCHC RBC AUTO-ENTMCNC: 33.3 G/DL (ref 32–36)
MCV RBC AUTO: 92 FL (ref 82–98)
MONOCYTES # BLD AUTO: 0.9 K/UL (ref 0.3–1)
MONOCYTES NFR BLD: 18.2 % (ref 4–15)
NEUTROPHILS # BLD AUTO: 2.8 K/UL (ref 1.8–7.7)
NEUTROPHILS NFR BLD: 56.9 % (ref 38–73)
NRBC BLD-RTO: 0 /100 WBC
PLATELET # BLD AUTO: 391 K/UL (ref 150–450)
PMV BLD AUTO: 9.7 FL (ref 9.2–12.9)
POTASSIUM SERPL-SCNC: 3.6 MMOL/L (ref 3.5–5.1)
PROT SERPL-MCNC: 7.2 G/DL (ref 6–8.4)
RBC # BLD AUTO: 4.35 M/UL (ref 4–5.4)
SODIUM SERPL-SCNC: 141 MMOL/L (ref 136–145)
WBC # BLD AUTO: 4.9 K/UL (ref 3.9–12.7)

## 2021-03-30 PROCEDURE — 83735 ASSAY OF MAGNESIUM: CPT | Performed by: INTERNAL MEDICINE

## 2021-03-30 PROCEDURE — 80053 COMPREHEN METABOLIC PANEL: CPT | Performed by: INTERNAL MEDICINE

## 2021-03-30 PROCEDURE — 36415 COLL VENOUS BLD VENIPUNCTURE: CPT | Mod: PO | Performed by: INTERNAL MEDICINE

## 2021-03-30 PROCEDURE — 83615 LACTATE (LD) (LDH) ENZYME: CPT | Performed by: INTERNAL MEDICINE

## 2021-03-30 PROCEDURE — 85025 COMPLETE CBC W/AUTO DIFF WBC: CPT | Mod: PO | Performed by: INTERNAL MEDICINE

## 2021-03-31 ENCOUNTER — INFUSION (OUTPATIENT)
Dept: INFUSION THERAPY | Facility: HOSPITAL | Age: 76
End: 2021-03-31
Attending: INTERNAL MEDICINE
Payer: MEDICARE

## 2021-03-31 ENCOUNTER — PATIENT MESSAGE (OUTPATIENT)
Dept: HEMATOLOGY/ONCOLOGY | Facility: CLINIC | Age: 76
End: 2021-03-31

## 2021-03-31 ENCOUNTER — OFFICE VISIT (OUTPATIENT)
Dept: HEMATOLOGY/ONCOLOGY | Facility: CLINIC | Age: 76
End: 2021-03-31
Payer: MEDICARE

## 2021-03-31 VITALS
HEART RATE: 91 BPM | RESPIRATION RATE: 18 BRPM | SYSTOLIC BLOOD PRESSURE: 112 MMHG | BODY MASS INDEX: 23.43 KG/M2 | DIASTOLIC BLOOD PRESSURE: 72 MMHG | WEIGHT: 132.25 LBS | HEIGHT: 63 IN | OXYGEN SATURATION: 99 %

## 2021-03-31 VITALS
HEIGHT: 63 IN | SYSTOLIC BLOOD PRESSURE: 142 MMHG | RESPIRATION RATE: 16 BRPM | HEART RATE: 94 BPM | BODY MASS INDEX: 23.43 KG/M2 | DIASTOLIC BLOOD PRESSURE: 68 MMHG | WEIGHT: 132.25 LBS | TEMPERATURE: 97 F

## 2021-03-31 DIAGNOSIS — R45.89 ANXIETY ABOUT HEALTH: ICD-10-CM

## 2021-03-31 DIAGNOSIS — C45.0 MALIGNANT MESOTHELIOMA OF PLEURA: Primary | ICD-10-CM

## 2021-03-31 DIAGNOSIS — F32.0 CURRENT MILD EPISODE OF MAJOR DEPRESSIVE DISORDER WITHOUT PRIOR EPISODE: ICD-10-CM

## 2021-03-31 DIAGNOSIS — G89.3 NEOPLASM RELATED PAIN: ICD-10-CM

## 2021-03-31 PROCEDURE — 96367 TX/PROPH/DG ADDL SEQ IV INF: CPT | Mod: PN

## 2021-03-31 PROCEDURE — 96375 TX/PRO/DX INJ NEW DRUG ADDON: CPT | Mod: PN

## 2021-03-31 PROCEDURE — 3008F BODY MASS INDEX DOCD: CPT | Mod: CPTII,S$GLB,, | Performed by: INTERNAL MEDICINE

## 2021-03-31 PROCEDURE — 99999 PR PBB SHADOW E&M-EST. PATIENT-LVL IV: ICD-10-PCS | Mod: PBBFAC,,, | Performed by: INTERNAL MEDICINE

## 2021-03-31 PROCEDURE — 96413 CHEMO IV INFUSION 1 HR: CPT | Mod: PN

## 2021-03-31 PROCEDURE — 99999 PR PBB SHADOW E&M-EST. PATIENT-LVL IV: CPT | Mod: PBBFAC,,, | Performed by: INTERNAL MEDICINE

## 2021-03-31 PROCEDURE — 3288F FALL RISK ASSESSMENT DOCD: CPT | Mod: CPTII,S$GLB,, | Performed by: INTERNAL MEDICINE

## 2021-03-31 PROCEDURE — 96411 CHEMO IV PUSH ADDL DRUG: CPT | Mod: PN

## 2021-03-31 PROCEDURE — 99215 PR OFFICE/OUTPT VISIT, EST, LEVL V, 40-54 MIN: ICD-10-PCS | Mod: S$GLB,,, | Performed by: INTERNAL MEDICINE

## 2021-03-31 PROCEDURE — 1126F PR PAIN SEVERITY QUANTIFIED, NO PAIN PRESENT: ICD-10-PCS | Mod: S$GLB,,, | Performed by: INTERNAL MEDICINE

## 2021-03-31 PROCEDURE — 3288F PR FALLS RISK ASSESSMENT DOCUMENTED: ICD-10-PCS | Mod: CPTII,S$GLB,, | Performed by: INTERNAL MEDICINE

## 2021-03-31 PROCEDURE — 1101F PT FALLS ASSESS-DOCD LE1/YR: CPT | Mod: CPTII,S$GLB,, | Performed by: INTERNAL MEDICINE

## 2021-03-31 PROCEDURE — 25000003 PHARM REV CODE 250: Mod: PN | Performed by: INTERNAL MEDICINE

## 2021-03-31 PROCEDURE — 1159F PR MEDICATION LIST DOCUMENTED IN MEDICAL RECORD: ICD-10-PCS | Mod: S$GLB,,, | Performed by: INTERNAL MEDICINE

## 2021-03-31 PROCEDURE — 3008F PR BODY MASS INDEX (BMI) DOCUMENTED: ICD-10-PCS | Mod: CPTII,S$GLB,, | Performed by: INTERNAL MEDICINE

## 2021-03-31 PROCEDURE — 1159F MED LIST DOCD IN RCRD: CPT | Mod: S$GLB,,, | Performed by: INTERNAL MEDICINE

## 2021-03-31 PROCEDURE — 96366 THER/PROPH/DIAG IV INF ADDON: CPT | Mod: PN

## 2021-03-31 PROCEDURE — 1126F AMNT PAIN NOTED NONE PRSNT: CPT | Mod: S$GLB,,, | Performed by: INTERNAL MEDICINE

## 2021-03-31 PROCEDURE — 99215 OFFICE O/P EST HI 40 MIN: CPT | Mod: S$GLB,,, | Performed by: INTERNAL MEDICINE

## 2021-03-31 PROCEDURE — A4216 STERILE WATER/SALINE, 10 ML: HCPCS | Mod: PN | Performed by: INTERNAL MEDICINE

## 2021-03-31 PROCEDURE — 1101F PR PT FALLS ASSESS DOC 0-1 FALLS W/OUT INJ PAST YR: ICD-10-PCS | Mod: CPTII,S$GLB,, | Performed by: INTERNAL MEDICINE

## 2021-03-31 PROCEDURE — 63600175 PHARM REV CODE 636 W HCPCS: Mod: PN | Performed by: INTERNAL MEDICINE

## 2021-03-31 RX ORDER — CYANOCOBALAMIN 1000 UG/ML
1000 INJECTION, SOLUTION INTRAMUSCULAR; SUBCUTANEOUS ONCE
Status: CANCELLED
Start: 2021-03-31

## 2021-03-31 RX ORDER — CYANOCOBALAMIN 1000 UG/ML
1000 INJECTION, SOLUTION INTRAMUSCULAR; SUBCUTANEOUS ONCE
Status: DISCONTINUED | OUTPATIENT
Start: 2021-03-31 | End: 2021-03-31 | Stop reason: HOSPADM

## 2021-03-31 RX ORDER — SODIUM CHLORIDE 0.9 % (FLUSH) 0.9 %
10 SYRINGE (ML) INJECTION
Status: DISCONTINUED | OUTPATIENT
Start: 2021-03-31 | End: 2021-03-31 | Stop reason: HOSPADM

## 2021-03-31 RX ORDER — HEPARIN 100 UNIT/ML
500 SYRINGE INTRAVENOUS
Status: CANCELLED | OUTPATIENT
Start: 2021-03-31

## 2021-03-31 RX ORDER — VENLAFAXINE HYDROCHLORIDE 75 MG/1
150 CAPSULE, EXTENDED RELEASE ORAL DAILY
Qty: 180 CAPSULE | Refills: 3 | Status: SHIPPED | OUTPATIENT
Start: 2021-03-31 | End: 2022-07-20

## 2021-03-31 RX ORDER — HEPARIN 100 UNIT/ML
500 SYRINGE INTRAVENOUS
Status: DISCONTINUED | OUTPATIENT
Start: 2021-03-31 | End: 2021-03-31 | Stop reason: HOSPADM

## 2021-03-31 RX ORDER — SODIUM CHLORIDE 0.9 % (FLUSH) 0.9 %
10 SYRINGE (ML) INJECTION
Status: CANCELLED | OUTPATIENT
Start: 2021-03-31

## 2021-03-31 RX ORDER — HYDROCODONE BITARTRATE AND ACETAMINOPHEN 7.5; 325 MG/1; MG/1
1 TABLET ORAL EVERY 6 HOURS PRN
Qty: 60 TABLET | Refills: 0 | Status: ON HOLD | OUTPATIENT
Start: 2021-03-31 | End: 2021-05-25 | Stop reason: HOSPADM

## 2021-03-31 RX ORDER — ALPRAZOLAM 0.25 MG/1
0.25 TABLET ORAL 3 TIMES DAILY PRN
Qty: 60 TABLET | Refills: 0 | Status: SHIPPED | OUTPATIENT
Start: 2021-03-31 | End: 2022-07-20

## 2021-03-31 RX ADMIN — SODIUM CHLORIDE 800 MG: 9 INJECTION, SOLUTION INTRAVENOUS at 11:03

## 2021-03-31 RX ADMIN — MANNITOL 12.5 G: 12.5 INJECTION, SOLUTION INTRAVENOUS at 12:03

## 2021-03-31 RX ADMIN — DEXAMETHASONE SODIUM PHOSPHATE 0.25 MG: 4 INJECTION, SOLUTION INTRA-ARTICULAR; INTRALESIONAL; INTRAMUSCULAR; INTRAVENOUS; SOFT TISSUE at 11:03

## 2021-03-31 RX ADMIN — POTASSIUM CHLORIDE: 2 INJECTION, SOLUTION, CONCENTRATE INTRAVENOUS at 09:03

## 2021-03-31 RX ADMIN — APREPITANT 130 MG: 130 INJECTION, EMULSION INTRAVENOUS at 11:03

## 2021-03-31 RX ADMIN — Medication 10 ML: at 08:03

## 2021-03-31 RX ADMIN — Medication 10 ML: at 04:03

## 2021-03-31 RX ADMIN — CISPLATIN 124 MG: 1 INJECTION INTRAVENOUS at 01:03

## 2021-03-31 RX ADMIN — POTASSIUM CHLORIDE: 2 INJECTION, SOLUTION, CONCENTRATE INTRAVENOUS at 02:03

## 2021-03-31 RX ADMIN — SODIUM CHLORIDE: 9 INJECTION, SOLUTION INTRAVENOUS at 08:03

## 2021-04-16 ENCOUNTER — DOCUMENT SCAN (OUTPATIENT)
Dept: HOME HEALTH SERVICES | Facility: HOSPITAL | Age: 76
End: 2021-04-16
Payer: MEDICARE

## 2021-04-20 ENCOUNTER — LAB VISIT (OUTPATIENT)
Dept: LAB | Facility: HOSPITAL | Age: 76
End: 2021-04-20
Attending: INTERNAL MEDICINE
Payer: MEDICARE

## 2021-04-20 DIAGNOSIS — C45.0 MALIGNANT MESOTHELIOMA OF PLEURA: ICD-10-CM

## 2021-04-20 LAB
ALBUMIN SERPL BCP-MCNC: 3 G/DL (ref 3.5–5.2)
ALP SERPL-CCNC: 99 U/L (ref 55–135)
ALT SERPL W/O P-5'-P-CCNC: 10 U/L (ref 10–44)
ANION GAP SERPL CALC-SCNC: 9 MMOL/L (ref 8–16)
AST SERPL-CCNC: 15 U/L (ref 10–40)
BASOPHILS # BLD AUTO: 0.02 K/UL (ref 0–0.2)
BASOPHILS NFR BLD: 0.5 % (ref 0–1.9)
BILIRUB SERPL-MCNC: 0.3 MG/DL (ref 0.1–1)
BUN SERPL-MCNC: 11 MG/DL (ref 8–23)
CALCIUM SERPL-MCNC: 9.1 MG/DL (ref 8.7–10.5)
CHLORIDE SERPL-SCNC: 96 MMOL/L (ref 95–110)
CO2 SERPL-SCNC: 32 MMOL/L (ref 23–29)
CREAT SERPL-MCNC: 0.8 MG/DL (ref 0.5–1.4)
DIFFERENTIAL METHOD: ABNORMAL
EOSINOPHIL # BLD AUTO: 0 K/UL (ref 0–0.5)
EOSINOPHIL NFR BLD: 0.7 % (ref 0–8)
ERYTHROCYTE [DISTWIDTH] IN BLOOD BY AUTOMATED COUNT: 13.8 % (ref 11.5–14.5)
EST. GFR  (AFRICAN AMERICAN): >60 ML/MIN/1.73 M^2
EST. GFR  (NON AFRICAN AMERICAN): >60 ML/MIN/1.73 M^2
GLUCOSE SERPL-MCNC: 105 MG/DL (ref 70–110)
HCT VFR BLD AUTO: 34.3 % (ref 37–48.5)
HGB BLD-MCNC: 11.9 G/DL (ref 12–16)
IMM GRANULOCYTES # BLD AUTO: 0.01 K/UL (ref 0–0.04)
IMM GRANULOCYTES NFR BLD AUTO: 0.2 % (ref 0–0.5)
LDH SERPL L TO P-CCNC: 176 U/L (ref 110–260)
LYMPHOCYTES # BLD AUTO: 0.9 K/UL (ref 1–4.8)
LYMPHOCYTES NFR BLD: 22.7 % (ref 18–48)
MAGNESIUM SERPL-MCNC: 1.8 MG/DL (ref 1.6–2.6)
MCH RBC QN AUTO: 31.5 PG (ref 27–31)
MCHC RBC AUTO-ENTMCNC: 34.7 G/DL (ref 32–36)
MCV RBC AUTO: 91 FL (ref 82–98)
MONOCYTES # BLD AUTO: 1 K/UL (ref 0.3–1)
MONOCYTES NFR BLD: 23.7 % (ref 4–15)
NEUTROPHILS # BLD AUTO: 2.1 K/UL (ref 1.8–7.7)
NEUTROPHILS NFR BLD: 52.4 % (ref 38–73)
NRBC BLD-RTO: 0 /100 WBC
PLATELET # BLD AUTO: 323 K/UL (ref 150–450)
PMV BLD AUTO: 9.3 FL (ref 9.2–12.9)
POTASSIUM SERPL-SCNC: 3.2 MMOL/L (ref 3.5–5.1)
PROT SERPL-MCNC: 7 G/DL (ref 6–8.4)
RBC # BLD AUTO: 3.78 M/UL (ref 4–5.4)
SODIUM SERPL-SCNC: 137 MMOL/L (ref 136–145)
WBC # BLD AUTO: 4.01 K/UL (ref 3.9–12.7)

## 2021-04-20 PROCEDURE — 83735 ASSAY OF MAGNESIUM: CPT | Performed by: INTERNAL MEDICINE

## 2021-04-20 PROCEDURE — 85025 COMPLETE CBC W/AUTO DIFF WBC: CPT | Mod: PO | Performed by: INTERNAL MEDICINE

## 2021-04-20 PROCEDURE — 36415 COLL VENOUS BLD VENIPUNCTURE: CPT | Mod: PO | Performed by: INTERNAL MEDICINE

## 2021-04-20 PROCEDURE — 80053 COMPREHEN METABOLIC PANEL: CPT | Performed by: INTERNAL MEDICINE

## 2021-04-20 PROCEDURE — 83615 LACTATE (LD) (LDH) ENZYME: CPT | Performed by: INTERNAL MEDICINE

## 2021-04-20 RX ORDER — CYANOCOBALAMIN 1000 UG/ML
1000 INJECTION, SOLUTION INTRAMUSCULAR; SUBCUTANEOUS
Status: CANCELLED | OUTPATIENT
Start: 2021-04-21

## 2021-04-20 RX ORDER — SODIUM CHLORIDE 0.9 % (FLUSH) 0.9 %
10 SYRINGE (ML) INJECTION
Status: CANCELLED | OUTPATIENT
Start: 2021-04-21

## 2021-04-20 RX ORDER — CYANOCOBALAMIN 1000 UG/ML
1000 INJECTION, SOLUTION INTRAMUSCULAR; SUBCUTANEOUS ONCE
Status: CANCELLED
Start: 2021-04-21

## 2021-04-20 RX ORDER — HEPARIN 100 UNIT/ML
500 SYRINGE INTRAVENOUS
Status: CANCELLED | OUTPATIENT
Start: 2021-04-21

## 2021-04-21 ENCOUNTER — INFUSION (OUTPATIENT)
Dept: INFUSION THERAPY | Facility: HOSPITAL | Age: 76
End: 2021-04-21
Attending: INTERNAL MEDICINE
Payer: MEDICARE

## 2021-04-21 ENCOUNTER — OFFICE VISIT (OUTPATIENT)
Dept: HEMATOLOGY/ONCOLOGY | Facility: CLINIC | Age: 76
End: 2021-04-21
Payer: MEDICARE

## 2021-04-21 ENCOUNTER — PATIENT MESSAGE (OUTPATIENT)
Dept: HEMATOLOGY/ONCOLOGY | Facility: CLINIC | Age: 76
End: 2021-04-21

## 2021-04-21 VITALS
OXYGEN SATURATION: 98 % | HEIGHT: 63 IN | RESPIRATION RATE: 18 BRPM | WEIGHT: 130.5 LBS | SYSTOLIC BLOOD PRESSURE: 117 MMHG | DIASTOLIC BLOOD PRESSURE: 73 MMHG | HEART RATE: 86 BPM | BODY MASS INDEX: 23.12 KG/M2

## 2021-04-21 VITALS
HEART RATE: 86 BPM | DIASTOLIC BLOOD PRESSURE: 73 MMHG | TEMPERATURE: 97 F | BODY MASS INDEX: 23.12 KG/M2 | HEIGHT: 63 IN | WEIGHT: 130.5 LBS | RESPIRATION RATE: 18 BRPM | SYSTOLIC BLOOD PRESSURE: 117 MMHG

## 2021-04-21 DIAGNOSIS — G89.3 NEOPLASM RELATED PAIN: ICD-10-CM

## 2021-04-21 DIAGNOSIS — R45.89 ANXIETY ABOUT HEALTH: ICD-10-CM

## 2021-04-21 DIAGNOSIS — C45.0 MALIGNANT MESOTHELIOMA OF PLEURA: Primary | ICD-10-CM

## 2021-04-21 DIAGNOSIS — R63.0 LOSS OF APPETITE: ICD-10-CM

## 2021-04-21 DIAGNOSIS — F32.0 CURRENT MILD EPISODE OF MAJOR DEPRESSIVE DISORDER WITHOUT PRIOR EPISODE: ICD-10-CM

## 2021-04-21 PROCEDURE — 1126F AMNT PAIN NOTED NONE PRSNT: CPT | Mod: S$GLB,,, | Performed by: INTERNAL MEDICINE

## 2021-04-21 PROCEDURE — 63600175 PHARM REV CODE 636 W HCPCS: Mod: PN | Performed by: INTERNAL MEDICINE

## 2021-04-21 PROCEDURE — 1101F PR PT FALLS ASSESS DOC 0-1 FALLS W/OUT INJ PAST YR: ICD-10-PCS | Mod: CPTII,S$GLB,, | Performed by: INTERNAL MEDICINE

## 2021-04-21 PROCEDURE — 1126F PR PAIN SEVERITY QUANTIFIED, NO PAIN PRESENT: ICD-10-PCS | Mod: S$GLB,,, | Performed by: INTERNAL MEDICINE

## 2021-04-21 PROCEDURE — 99214 OFFICE O/P EST MOD 30 MIN: CPT | Mod: S$GLB,,, | Performed by: INTERNAL MEDICINE

## 2021-04-21 PROCEDURE — 96375 TX/PRO/DX INJ NEW DRUG ADDON: CPT | Mod: PN

## 2021-04-21 PROCEDURE — 3288F PR FALLS RISK ASSESSMENT DOCUMENTED: ICD-10-PCS | Mod: CPTII,S$GLB,, | Performed by: INTERNAL MEDICINE

## 2021-04-21 PROCEDURE — 3008F BODY MASS INDEX DOCD: CPT | Mod: CPTII,S$GLB,, | Performed by: INTERNAL MEDICINE

## 2021-04-21 PROCEDURE — 96413 CHEMO IV INFUSION 1 HR: CPT | Mod: PN

## 2021-04-21 PROCEDURE — 99214 PR OFFICE/OUTPT VISIT, EST, LEVL IV, 30-39 MIN: ICD-10-PCS | Mod: S$GLB,,, | Performed by: INTERNAL MEDICINE

## 2021-04-21 PROCEDURE — 1159F PR MEDICATION LIST DOCUMENTED IN MEDICAL RECORD: ICD-10-PCS | Mod: S$GLB,,, | Performed by: INTERNAL MEDICINE

## 2021-04-21 PROCEDURE — 96366 THER/PROPH/DIAG IV INF ADDON: CPT | Mod: PN

## 2021-04-21 PROCEDURE — A4216 STERILE WATER/SALINE, 10 ML: HCPCS | Mod: PN | Performed by: INTERNAL MEDICINE

## 2021-04-21 PROCEDURE — 25000003 PHARM REV CODE 250: Mod: PN | Performed by: INTERNAL MEDICINE

## 2021-04-21 PROCEDURE — 1101F PT FALLS ASSESS-DOCD LE1/YR: CPT | Mod: CPTII,S$GLB,, | Performed by: INTERNAL MEDICINE

## 2021-04-21 PROCEDURE — 96372 THER/PROPH/DIAG INJ SC/IM: CPT | Mod: PN

## 2021-04-21 PROCEDURE — 1159F MED LIST DOCD IN RCRD: CPT | Mod: S$GLB,,, | Performed by: INTERNAL MEDICINE

## 2021-04-21 PROCEDURE — 3288F FALL RISK ASSESSMENT DOCD: CPT | Mod: CPTII,S$GLB,, | Performed by: INTERNAL MEDICINE

## 2021-04-21 PROCEDURE — 96367 TX/PROPH/DG ADDL SEQ IV INF: CPT | Mod: PN

## 2021-04-21 PROCEDURE — 3008F PR BODY MASS INDEX (BMI) DOCUMENTED: ICD-10-PCS | Mod: CPTII,S$GLB,, | Performed by: INTERNAL MEDICINE

## 2021-04-21 PROCEDURE — 96411 CHEMO IV PUSH ADDL DRUG: CPT | Mod: PN

## 2021-04-21 PROCEDURE — 99999 PR PBB SHADOW E&M-EST. PATIENT-LVL IV: CPT | Mod: PBBFAC,,, | Performed by: INTERNAL MEDICINE

## 2021-04-21 PROCEDURE — 99999 PR PBB SHADOW E&M-EST. PATIENT-LVL IV: ICD-10-PCS | Mod: PBBFAC,,, | Performed by: INTERNAL MEDICINE

## 2021-04-21 RX ORDER — POTASSIUM CHLORIDE 750 MG/1
10 CAPSULE, EXTENDED RELEASE ORAL DAILY
Qty: 90 CAPSULE | Refills: 3 | Status: ON HOLD | OUTPATIENT
Start: 2021-04-21 | End: 2021-05-25 | Stop reason: HOSPADM

## 2021-04-21 RX ORDER — CYANOCOBALAMIN 1000 UG/ML
1000 INJECTION, SOLUTION INTRAMUSCULAR; SUBCUTANEOUS
Status: COMPLETED | OUTPATIENT
Start: 2021-04-21 | End: 2021-04-21

## 2021-04-21 RX ORDER — SODIUM CHLORIDE 0.9 % (FLUSH) 0.9 %
10 SYRINGE (ML) INJECTION
Status: DISCONTINUED | OUTPATIENT
Start: 2021-04-21 | End: 2021-04-21 | Stop reason: HOSPADM

## 2021-04-21 RX ORDER — MEGESTROL ACETATE 125 MG/ML
625 SUSPENSION ORAL DAILY
Qty: 150 ML | Refills: 11 | Status: SHIPPED | OUTPATIENT
Start: 2021-04-21 | End: 2022-04-21

## 2021-04-21 RX ORDER — CYANOCOBALAMIN 1000 UG/ML
1000 INJECTION, SOLUTION INTRAMUSCULAR; SUBCUTANEOUS ONCE
Status: DISCONTINUED | OUTPATIENT
Start: 2021-04-21 | End: 2021-04-21 | Stop reason: SDUPTHER

## 2021-04-21 RX ORDER — HEPARIN 100 UNIT/ML
500 SYRINGE INTRAVENOUS
Status: DISCONTINUED | OUTPATIENT
Start: 2021-04-21 | End: 2021-04-21 | Stop reason: HOSPADM

## 2021-04-21 RX ADMIN — CYANOCOBALAMIN 1000 MCG: 1000 INJECTION, SOLUTION INTRAMUSCULAR at 04:04

## 2021-04-21 RX ADMIN — POTASSIUM CHLORIDE: 2 INJECTION, SOLUTION, CONCENTRATE INTRAVENOUS at 03:04

## 2021-04-21 RX ADMIN — SODIUM CHLORIDE 825 MG: 9 INJECTION, SOLUTION INTRAVENOUS at 01:04

## 2021-04-21 RX ADMIN — Medication 10 ML: at 05:04

## 2021-04-21 RX ADMIN — MANNITOL 12.5 G: 250 INJECTION, SOLUTION INTRAVENOUS at 01:04

## 2021-04-21 RX ADMIN — CISPLATIN 124 MG: 100 INJECTION, SOLUTION INTRAVENOUS at 02:04

## 2021-04-21 RX ADMIN — DEXAMETHASONE SODIUM PHOSPHATE 0.25 MG: 10 INJECTION, SOLUTION INTRAMUSCULAR; INTRAVENOUS at 12:04

## 2021-04-21 RX ADMIN — APREPITANT 130 MG: 130 INJECTION, EMULSION INTRAVENOUS at 01:04

## 2021-04-21 RX ADMIN — POTASSIUM CHLORIDE: 2 INJECTION, SOLUTION, CONCENTRATE INTRAVENOUS at 10:04

## 2021-05-05 ENCOUNTER — EXTERNAL HOME HEALTH (OUTPATIENT)
Dept: HOME HEALTH SERVICES | Facility: HOSPITAL | Age: 76
End: 2021-05-05
Payer: MEDICARE

## 2021-05-10 ENCOUNTER — PATIENT MESSAGE (OUTPATIENT)
Dept: HEMATOLOGY/ONCOLOGY | Facility: CLINIC | Age: 76
End: 2021-05-10

## 2021-05-10 ENCOUNTER — LAB VISIT (OUTPATIENT)
Dept: LAB | Facility: HOSPITAL | Age: 76
End: 2021-05-10
Attending: INTERNAL MEDICINE
Payer: MEDICARE

## 2021-05-10 ENCOUNTER — OFFICE VISIT (OUTPATIENT)
Dept: HEMATOLOGY/ONCOLOGY | Facility: CLINIC | Age: 76
End: 2021-05-10
Payer: MEDICARE

## 2021-05-10 VITALS
HEIGHT: 63 IN | WEIGHT: 126.56 LBS | RESPIRATION RATE: 18 BRPM | OXYGEN SATURATION: 99 % | DIASTOLIC BLOOD PRESSURE: 69 MMHG | HEART RATE: 101 BPM | SYSTOLIC BLOOD PRESSURE: 109 MMHG | BODY MASS INDEX: 22.43 KG/M2

## 2021-05-10 DIAGNOSIS — C45.0 MALIGNANT MESOTHELIOMA OF PLEURA: ICD-10-CM

## 2021-05-10 DIAGNOSIS — C45.0 MALIGNANT MESOTHELIOMA OF PLEURA: Primary | ICD-10-CM

## 2021-05-10 DIAGNOSIS — F32.0 CURRENT MILD EPISODE OF MAJOR DEPRESSIVE DISORDER WITHOUT PRIOR EPISODE: ICD-10-CM

## 2021-05-10 DIAGNOSIS — R45.89 ANXIETY ABOUT HEALTH: ICD-10-CM

## 2021-05-10 DIAGNOSIS — F51.02 ADJUSTMENT INSOMNIA: ICD-10-CM

## 2021-05-10 DIAGNOSIS — T45.1X5A CINV (CHEMOTHERAPY-INDUCED NAUSEA AND VOMITING): ICD-10-CM

## 2021-05-10 DIAGNOSIS — R11.2 CINV (CHEMOTHERAPY-INDUCED NAUSEA AND VOMITING): ICD-10-CM

## 2021-05-10 DIAGNOSIS — G89.3 NEOPLASM RELATED PAIN: ICD-10-CM

## 2021-05-10 LAB
ALBUMIN SERPL BCP-MCNC: 3.7 G/DL (ref 3.5–5.2)
ALP SERPL-CCNC: 145 U/L (ref 38–145)
ALT SERPL W/O P-5'-P-CCNC: 24 U/L (ref 0–35)
ANION GAP SERPL CALC-SCNC: 12 MMOL/L (ref 8–16)
AST SERPL-CCNC: 28 U/L (ref 14–36)
BASOPHILS # BLD AUTO: 0.01 K/UL (ref 0–0.2)
BASOPHILS NFR BLD: 0.2 % (ref 0–1.9)
BILIRUB SERPL-MCNC: 0.5 MG/DL (ref 0.2–1.3)
CALCIUM SERPL-MCNC: 9.4 MG/DL (ref 8.4–10.2)
CHLORIDE SERPL-SCNC: 92 MMOL/L (ref 95–110)
CO2 SERPL-SCNC: 28 MMOL/L (ref 22–31)
CREAT SERPL-MCNC: 0.81 MG/DL (ref 0.5–1.4)
DIFFERENTIAL METHOD: ABNORMAL
EOSINOPHIL # BLD AUTO: 0 K/UL (ref 0–0.5)
EOSINOPHIL NFR BLD: 0 % (ref 0–8)
ERYTHROCYTE [DISTWIDTH] IN BLOOD BY AUTOMATED COUNT: 14.4 % (ref 11.5–14.5)
EST. GFR  (AFRICAN AMERICAN): >60 ML/MIN/1.73 M^2
EST. GFR  (NON AFRICAN AMERICAN): >60 ML/MIN/1.73 M^2
GLUCOSE SERPL-MCNC: 209 MG/DL (ref 70–110)
HCT VFR BLD AUTO: 27.9 % (ref 37–48.5)
HGB BLD-MCNC: 9.3 G/DL (ref 12–16)
IMM GRANULOCYTES # BLD AUTO: 0.02 K/UL (ref 0–0.04)
IMM GRANULOCYTES NFR BLD AUTO: 0.4 % (ref 0–0.5)
LDH SERPL L TO P-CCNC: 166 U/L (ref 110–260)
LYMPHOCYTES # BLD AUTO: 0.7 K/UL (ref 1–4.8)
LYMPHOCYTES NFR BLD: 12.3 % (ref 18–48)
MAGNESIUM SERPL-MCNC: 1.8 MG/DL (ref 1.6–2.6)
MCH RBC QN AUTO: 31.2 PG (ref 27–31)
MCHC RBC AUTO-ENTMCNC: 33.3 G/DL (ref 32–36)
MCV RBC AUTO: 94 FL (ref 82–98)
MONOCYTES # BLD AUTO: 1.2 K/UL (ref 0.3–1)
MONOCYTES NFR BLD: 20.8 % (ref 4–15)
NEUTROPHILS # BLD AUTO: 3.7 K/UL (ref 1.8–7.7)
NEUTROPHILS NFR BLD: 66.3 % (ref 38–73)
NRBC BLD-RTO: 0 /100 WBC
PLATELET # BLD AUTO: 560 K/UL (ref 150–450)
PMV BLD AUTO: 9.4 FL (ref 9.2–12.9)
POTASSIUM SERPL-SCNC: 3.8 MMOL/L (ref 3.5–5.1)
PROT SERPL-MCNC: 7.6 G/DL (ref 6–8.4)
RBC # BLD AUTO: 2.98 M/UL (ref 4–5.4)
SODIUM SERPL-SCNC: 132 MMOL/L (ref 136–145)
UUN UR-MCNC: 15 MG/DL (ref 7–18)
WBC # BLD AUTO: 5.63 K/UL (ref 3.9–12.7)

## 2021-05-10 PROCEDURE — 99215 PR OFFICE/OUTPT VISIT, EST, LEVL V, 40-54 MIN: ICD-10-PCS | Mod: S$GLB,,, | Performed by: INTERNAL MEDICINE

## 2021-05-10 PROCEDURE — 85025 COMPLETE CBC W/AUTO DIFF WBC: CPT | Performed by: INTERNAL MEDICINE

## 2021-05-10 PROCEDURE — 99999 PR PBB SHADOW E&M-EST. PATIENT-LVL III: ICD-10-PCS | Mod: PBBFAC,,, | Performed by: INTERNAL MEDICINE

## 2021-05-10 PROCEDURE — 83615 LACTATE (LD) (LDH) ENZYME: CPT | Performed by: INTERNAL MEDICINE

## 2021-05-10 PROCEDURE — 36415 COLL VENOUS BLD VENIPUNCTURE: CPT | Mod: PN | Performed by: INTERNAL MEDICINE

## 2021-05-10 PROCEDURE — 1159F MED LIST DOCD IN RCRD: CPT | Mod: S$GLB,,, | Performed by: INTERNAL MEDICINE

## 2021-05-10 PROCEDURE — 1126F PR PAIN SEVERITY QUANTIFIED, NO PAIN PRESENT: ICD-10-PCS | Mod: S$GLB,,, | Performed by: INTERNAL MEDICINE

## 2021-05-10 PROCEDURE — 1101F PT FALLS ASSESS-DOCD LE1/YR: CPT | Mod: CPTII,S$GLB,, | Performed by: INTERNAL MEDICINE

## 2021-05-10 PROCEDURE — 80053 COMPREHEN METABOLIC PANEL: CPT | Performed by: INTERNAL MEDICINE

## 2021-05-10 PROCEDURE — 83615 LACTATE (LD) (LDH) ENZYME: CPT | Mod: PN | Performed by: INTERNAL MEDICINE

## 2021-05-10 PROCEDURE — 3288F FALL RISK ASSESSMENT DOCD: CPT | Mod: CPTII,S$GLB,, | Performed by: INTERNAL MEDICINE

## 2021-05-10 PROCEDURE — 3008F PR BODY MASS INDEX (BMI) DOCUMENTED: ICD-10-PCS | Mod: CPTII,S$GLB,, | Performed by: INTERNAL MEDICINE

## 2021-05-10 PROCEDURE — 80053 COMPREHEN METABOLIC PANEL: CPT | Mod: PN | Performed by: INTERNAL MEDICINE

## 2021-05-10 PROCEDURE — 83735 ASSAY OF MAGNESIUM: CPT | Mod: PN | Performed by: INTERNAL MEDICINE

## 2021-05-10 PROCEDURE — 3288F PR FALLS RISK ASSESSMENT DOCUMENTED: ICD-10-PCS | Mod: CPTII,S$GLB,, | Performed by: INTERNAL MEDICINE

## 2021-05-10 PROCEDURE — 1159F PR MEDICATION LIST DOCUMENTED IN MEDICAL RECORD: ICD-10-PCS | Mod: S$GLB,,, | Performed by: INTERNAL MEDICINE

## 2021-05-10 PROCEDURE — 99999 PR PBB SHADOW E&M-EST. PATIENT-LVL III: CPT | Mod: PBBFAC,,, | Performed by: INTERNAL MEDICINE

## 2021-05-10 PROCEDURE — 1101F PR PT FALLS ASSESS DOC 0-1 FALLS W/OUT INJ PAST YR: ICD-10-PCS | Mod: CPTII,S$GLB,, | Performed by: INTERNAL MEDICINE

## 2021-05-10 PROCEDURE — 99215 OFFICE O/P EST HI 40 MIN: CPT | Mod: S$GLB,,, | Performed by: INTERNAL MEDICINE

## 2021-05-10 PROCEDURE — 1126F AMNT PAIN NOTED NONE PRSNT: CPT | Mod: S$GLB,,, | Performed by: INTERNAL MEDICINE

## 2021-05-10 PROCEDURE — 83735 ASSAY OF MAGNESIUM: CPT | Performed by: INTERNAL MEDICINE

## 2021-05-10 PROCEDURE — 85025 COMPLETE CBC W/AUTO DIFF WBC: CPT | Mod: PN | Performed by: INTERNAL MEDICINE

## 2021-05-10 PROCEDURE — 3008F BODY MASS INDEX DOCD: CPT | Mod: CPTII,S$GLB,, | Performed by: INTERNAL MEDICINE

## 2021-05-10 RX ORDER — HEPARIN 100 UNIT/ML
500 SYRINGE INTRAVENOUS
Status: CANCELLED | OUTPATIENT
Start: 2021-05-12

## 2021-05-10 RX ORDER — MEGESTROL ACETATE 40 MG/ML
200 SUSPENSION ORAL DAILY
COMMUNITY
Start: 2021-04-21 | End: 2022-06-06 | Stop reason: SDUPTHER

## 2021-05-10 RX ORDER — POTASSIUM CHLORIDE 750 MG/1
10 TABLET, EXTENDED RELEASE ORAL DAILY
Status: ON HOLD | COMMUNITY
Start: 2021-04-21 | End: 2021-05-25 | Stop reason: HOSPADM

## 2021-05-10 RX ORDER — SODIUM CHLORIDE 0.9 % (FLUSH) 0.9 %
10 SYRINGE (ML) INJECTION
Status: CANCELLED | OUTPATIENT
Start: 2021-05-12

## 2021-05-10 RX ORDER — CYANOCOBALAMIN 1000 UG/ML
1000 INJECTION, SOLUTION INTRAMUSCULAR; SUBCUTANEOUS ONCE
Status: CANCELLED
Start: 2021-05-12

## 2021-05-10 RX ORDER — DRONABINOL 2.5 MG/1
2.5 CAPSULE ORAL
Qty: 60 CAPSULE | Refills: 0 | Status: ON HOLD | OUTPATIENT
Start: 2021-05-10 | End: 2021-05-25 | Stop reason: HOSPADM

## 2021-05-12 ENCOUNTER — INFUSION (OUTPATIENT)
Dept: INFUSION THERAPY | Facility: HOSPITAL | Age: 76
End: 2021-05-12
Attending: INTERNAL MEDICINE
Payer: MEDICARE

## 2021-05-12 VITALS
BODY MASS INDEX: 22.46 KG/M2 | TEMPERATURE: 98 F | RESPIRATION RATE: 16 BRPM | SYSTOLIC BLOOD PRESSURE: 160 MMHG | WEIGHT: 126.75 LBS | HEIGHT: 63 IN | DIASTOLIC BLOOD PRESSURE: 75 MMHG | HEART RATE: 100 BPM

## 2021-05-12 DIAGNOSIS — C45.0 MALIGNANT MESOTHELIOMA OF PLEURA: Primary | ICD-10-CM

## 2021-05-12 PROCEDURE — 96366 THER/PROPH/DIAG IV INF ADDON: CPT | Mod: PN

## 2021-05-12 PROCEDURE — 96375 TX/PRO/DX INJ NEW DRUG ADDON: CPT | Mod: PN

## 2021-05-12 PROCEDURE — 96413 CHEMO IV INFUSION 1 HR: CPT | Mod: PN

## 2021-05-12 PROCEDURE — 96372 THER/PROPH/DIAG INJ SC/IM: CPT | Mod: PN

## 2021-05-12 PROCEDURE — 63600175 PHARM REV CODE 636 W HCPCS: Mod: TB,PN | Performed by: INTERNAL MEDICINE

## 2021-05-12 PROCEDURE — 96411 CHEMO IV PUSH ADDL DRUG: CPT | Mod: PN

## 2021-05-12 PROCEDURE — 96367 TX/PROPH/DG ADDL SEQ IV INF: CPT | Mod: PN

## 2021-05-12 PROCEDURE — 25000003 PHARM REV CODE 250: Mod: PN | Performed by: INTERNAL MEDICINE

## 2021-05-12 RX ORDER — CYANOCOBALAMIN 1000 UG/ML
1000 INJECTION, SOLUTION INTRAMUSCULAR; SUBCUTANEOUS ONCE
Status: COMPLETED | OUTPATIENT
Start: 2021-05-12 | End: 2021-05-12

## 2021-05-12 RX ORDER — SODIUM CHLORIDE 0.9 % (FLUSH) 0.9 %
10 SYRINGE (ML) INJECTION
Status: DISCONTINUED | OUTPATIENT
Start: 2021-05-12 | End: 2021-05-12 | Stop reason: HOSPADM

## 2021-05-12 RX ORDER — HEPARIN 100 UNIT/ML
500 SYRINGE INTRAVENOUS
Status: DISCONTINUED | OUTPATIENT
Start: 2021-05-12 | End: 2021-05-12 | Stop reason: HOSPADM

## 2021-05-12 RX ORDER — LORAZEPAM 2 MG/ML
1 INJECTION INTRAMUSCULAR
Status: COMPLETED | OUTPATIENT
Start: 2021-05-12 | End: 2021-05-12

## 2021-05-12 RX ADMIN — MANNITOL 12.5 G: 12.5 INJECTION, SOLUTION INTRAVENOUS at 01:05

## 2021-05-12 RX ADMIN — DEXAMETHASONE SODIUM PHOSPHATE 0.25 MG: 4 INJECTION, SOLUTION INTRA-ARTICULAR; INTRALESIONAL; INTRAMUSCULAR; INTRAVENOUS; SOFT TISSUE at 12:05

## 2021-05-12 RX ADMIN — LORAZEPAM 1 MG: 2 INJECTION INTRAMUSCULAR; INTRAVENOUS at 12:05

## 2021-05-12 RX ADMIN — CYANOCOBALAMIN 1000 MCG: 1000 INJECTION, SOLUTION INTRAMUSCULAR at 04:05

## 2021-05-12 RX ADMIN — POTASSIUM CHLORIDE: 2 INJECTION, SOLUTION, CONCENTRATE INTRAVENOUS at 10:05

## 2021-05-12 RX ADMIN — POTASSIUM CHLORIDE: 2 INJECTION, SOLUTION, CONCENTRATE INTRAVENOUS at 02:05

## 2021-05-12 RX ADMIN — SODIUM CHLORIDE 800 MG: 9 INJECTION, SOLUTION INTRAVENOUS at 01:05

## 2021-05-12 RX ADMIN — APREPITANT 130 MG: 130 INJECTION, EMULSION INTRAVENOUS at 12:05

## 2021-05-12 RX ADMIN — CISPLATIN 124 MG: 1 INJECTION INTRAVENOUS at 01:05

## 2021-05-12 RX ADMIN — HEPARIN 500 UNITS: 100 SYRINGE at 05:05

## 2021-05-13 ENCOUNTER — DOCUMENTATION ONLY (OUTPATIENT)
Dept: INFUSION THERAPY | Facility: HOSPITAL | Age: 76
End: 2021-05-13

## 2021-05-18 ENCOUNTER — TELEPHONE (OUTPATIENT)
Dept: HEMATOLOGY/ONCOLOGY | Facility: CLINIC | Age: 76
End: 2021-05-18

## 2021-05-18 PROBLEM — E87.6 HYPOKALEMIA DUE TO EXCESSIVE GASTROINTESTINAL LOSS OF POTASSIUM: Status: ACTIVE | Noted: 2021-05-18

## 2021-05-18 PROBLEM — N17.9 AKI (ACUTE KIDNEY INJURY): Status: ACTIVE | Noted: 2021-05-18

## 2021-05-18 PROBLEM — R11.2 NAUSEA AND VOMITING: Status: ACTIVE | Noted: 2021-05-18

## 2021-05-18 PROBLEM — C45.9 MESOTHELIOMA: Status: RESOLVED | Noted: 2021-02-18 | Resolved: 2021-05-18

## 2021-05-19 PROBLEM — R45.89 ANXIETY ABOUT HEALTH: Status: RESOLVED | Noted: 2021-04-21 | Resolved: 2021-05-19

## 2021-05-19 PROBLEM — E44.0 MODERATE MALNUTRITION: Status: ACTIVE | Noted: 2021-05-19

## 2021-05-20 PROBLEM — K22.2 ESOPHAGEAL STENOSIS: Status: ACTIVE | Noted: 2021-05-20

## 2021-05-21 PROBLEM — E87.8 ELECTROLYTE ABNORMALITY: Status: ACTIVE | Noted: 2021-05-21

## 2021-05-21 PROBLEM — N30.01 ACUTE CYSTITIS WITH HEMATURIA: Status: ACTIVE | Noted: 2021-05-21

## 2021-05-25 ENCOUNTER — TELEPHONE (OUTPATIENT)
Dept: HEMATOLOGY/ONCOLOGY | Facility: CLINIC | Age: 76
End: 2021-05-25

## 2021-05-26 ENCOUNTER — TELEPHONE (OUTPATIENT)
Dept: INFUSION THERAPY | Facility: HOSPITAL | Age: 76
End: 2021-05-26

## 2021-06-01 ENCOUNTER — HOSPITAL ENCOUNTER (OUTPATIENT)
Dept: RADIOLOGY | Facility: HOSPITAL | Age: 76
Discharge: HOME | End: 2021-06-01
Attending: INTERNAL MEDICINE
Payer: MEDICARE

## 2021-06-01 DIAGNOSIS — C45.0 MALIGNANT MESOTHELIOMA OF PLEURA: ICD-10-CM

## 2021-06-01 PROCEDURE — A9552 F18 FDG: HCPCS | Mod: PO

## 2021-06-01 PROCEDURE — 78815 NM PET CT ROUTINE: ICD-10-PCS | Mod: 26,PS,, | Performed by: RADIOLOGY

## 2021-06-01 PROCEDURE — 78815 PET IMAGE W/CT SKULL-THIGH: CPT | Mod: 26,PS,, | Performed by: RADIOLOGY

## 2021-06-01 PROCEDURE — 78815 PET IMAGE W/CT SKULL-THIGH: CPT | Mod: TC,PO,PS

## 2021-06-03 ENCOUNTER — DOCUMENTATION ONLY (OUTPATIENT)
Dept: CARDIOTHORACIC SURGERY | Facility: HOSPITAL | Age: 76
End: 2021-06-03

## 2021-06-09 ENCOUNTER — TELEPHONE (OUTPATIENT)
Dept: HEMATOLOGY/ONCOLOGY | Facility: CLINIC | Age: 76
End: 2021-06-09

## 2021-06-09 DIAGNOSIS — D64.9 SYMPTOMATIC ANEMIA: Primary | ICD-10-CM

## 2021-06-09 DIAGNOSIS — C45.0 MALIGNANT MESOTHELIOMA OF PLEURA: Primary | ICD-10-CM

## 2021-06-14 ENCOUNTER — LAB VISIT (OUTPATIENT)
Dept: LAB | Facility: HOSPITAL | Age: 76
End: 2021-06-14
Attending: INTERNAL MEDICINE
Payer: MEDICARE

## 2021-06-14 DIAGNOSIS — D64.9 SYMPTOMATIC ANEMIA: ICD-10-CM

## 2021-06-14 DIAGNOSIS — C45.0 MALIGNANT MESOTHELIOMA OF PLEURA: ICD-10-CM

## 2021-06-14 LAB
ABO + RH BLD: NORMAL
ALBUMIN SERPL BCP-MCNC: 2.6 G/DL (ref 3.5–5.2)
ALP SERPL-CCNC: 102 U/L (ref 55–135)
ALT SERPL W/O P-5'-P-CCNC: 10 U/L (ref 10–44)
ANION GAP SERPL CALC-SCNC: 12 MMOL/L (ref 8–16)
AST SERPL-CCNC: 14 U/L (ref 10–40)
BASOPHILS # BLD AUTO: 0.03 K/UL (ref 0–0.2)
BASOPHILS NFR BLD: 0.2 % (ref 0–1.9)
BILIRUB SERPL-MCNC: 0.5 MG/DL (ref 0.1–1)
BLD GP AB SCN CELLS X3 SERPL QL: NORMAL
BUN SERPL-MCNC: 10 MG/DL (ref 8–23)
CALCIUM SERPL-MCNC: 9.2 MG/DL (ref 8.7–10.5)
CHLORIDE SERPL-SCNC: 94 MMOL/L (ref 95–110)
CO2 SERPL-SCNC: 29 MMOL/L (ref 23–29)
CREAT SERPL-MCNC: 0.9 MG/DL (ref 0.5–1.4)
DIFFERENTIAL METHOD: ABNORMAL
EOSINOPHIL # BLD AUTO: 0 K/UL (ref 0–0.5)
EOSINOPHIL NFR BLD: 0.2 % (ref 0–8)
ERYTHROCYTE [DISTWIDTH] IN BLOOD BY AUTOMATED COUNT: 16.7 % (ref 11.5–14.5)
EST. GFR  (AFRICAN AMERICAN): >60 ML/MIN/1.73 M^2
EST. GFR  (NON AFRICAN AMERICAN): >60 ML/MIN/1.73 M^2
GLUCOSE SERPL-MCNC: 180 MG/DL (ref 70–110)
HCT VFR BLD AUTO: 25.8 % (ref 37–48.5)
HGB BLD-MCNC: 8.5 G/DL (ref 12–16)
IMM GRANULOCYTES # BLD AUTO: 0.08 K/UL (ref 0–0.04)
IMM GRANULOCYTES NFR BLD AUTO: 0.6 % (ref 0–0.5)
LDH SERPL L TO P-CCNC: 207 U/L (ref 110–260)
LYMPHOCYTES # BLD AUTO: 1 K/UL (ref 1–4.8)
LYMPHOCYTES NFR BLD: 7.7 % (ref 18–48)
MAGNESIUM SERPL-MCNC: 1.6 MG/DL (ref 1.6–2.6)
MCH RBC QN AUTO: 31 PG (ref 27–31)
MCHC RBC AUTO-ENTMCNC: 32.9 G/DL (ref 32–36)
MCV RBC AUTO: 94 FL (ref 82–98)
MONOCYTES # BLD AUTO: 1.6 K/UL (ref 0.3–1)
MONOCYTES NFR BLD: 12.1 % (ref 4–15)
NEUTROPHILS # BLD AUTO: 10.4 K/UL (ref 1.8–7.7)
NEUTROPHILS NFR BLD: 79.2 % (ref 38–73)
NRBC BLD-RTO: 0 /100 WBC
PLATELET # BLD AUTO: 238 K/UL (ref 150–450)
PMV BLD AUTO: 9.6 FL (ref 9.2–12.9)
POTASSIUM SERPL-SCNC: 2.9 MMOL/L (ref 3.5–5.1)
PROT SERPL-MCNC: 7.5 G/DL (ref 6–8.4)
RBC # BLD AUTO: 2.74 M/UL (ref 4–5.4)
SODIUM SERPL-SCNC: 135 MMOL/L (ref 136–145)
WBC # BLD AUTO: 13.11 K/UL (ref 3.9–12.7)

## 2021-06-14 PROCEDURE — 86900 BLOOD TYPING SEROLOGIC ABO: CPT | Mod: PN | Performed by: INTERNAL MEDICINE

## 2021-06-14 PROCEDURE — 85025 COMPLETE CBC W/AUTO DIFF WBC: CPT | Mod: PN | Performed by: INTERNAL MEDICINE

## 2021-06-14 PROCEDURE — 83735 ASSAY OF MAGNESIUM: CPT | Mod: PN | Performed by: INTERNAL MEDICINE

## 2021-06-14 PROCEDURE — 83615 LACTATE (LD) (LDH) ENZYME: CPT | Mod: PN | Performed by: INTERNAL MEDICINE

## 2021-06-14 PROCEDURE — 80053 COMPREHEN METABOLIC PANEL: CPT | Mod: PN | Performed by: INTERNAL MEDICINE

## 2021-06-14 PROCEDURE — 36415 COLL VENOUS BLD VENIPUNCTURE: CPT | Mod: PN | Performed by: INTERNAL MEDICINE

## 2021-06-14 PROCEDURE — 86900 BLOOD TYPING SEROLOGIC ABO: CPT | Performed by: INTERNAL MEDICINE

## 2021-06-15 ENCOUNTER — OFFICE VISIT (OUTPATIENT)
Dept: HEMATOLOGY/ONCOLOGY | Facility: CLINIC | Age: 76
End: 2021-06-15
Payer: MEDICARE

## 2021-06-15 ENCOUNTER — INFUSION (OUTPATIENT)
Dept: INFUSION THERAPY | Facility: HOSPITAL | Age: 76
End: 2021-06-15
Attending: INTERNAL MEDICINE
Payer: MEDICARE

## 2021-06-15 VITALS
DIASTOLIC BLOOD PRESSURE: 66 MMHG | HEIGHT: 62 IN | SYSTOLIC BLOOD PRESSURE: 124 MMHG | OXYGEN SATURATION: 99 % | TEMPERATURE: 98 F | BODY MASS INDEX: 23.45 KG/M2 | HEART RATE: 98 BPM | WEIGHT: 127.44 LBS

## 2021-06-15 VITALS
RESPIRATION RATE: 18 BRPM | WEIGHT: 127.44 LBS | BODY MASS INDEX: 23.31 KG/M2 | TEMPERATURE: 98 F | SYSTOLIC BLOOD PRESSURE: 118 MMHG | HEART RATE: 91 BPM | DIASTOLIC BLOOD PRESSURE: 66 MMHG

## 2021-06-15 DIAGNOSIS — G89.3 NEOPLASM RELATED PAIN: ICD-10-CM

## 2021-06-15 DIAGNOSIS — C45.0 MALIGNANT MESOTHELIOMA OF PLEURA: Primary | ICD-10-CM

## 2021-06-15 DIAGNOSIS — F32.0 CURRENT MILD EPISODE OF MAJOR DEPRESSIVE DISORDER WITHOUT PRIOR EPISODE: ICD-10-CM

## 2021-06-15 DIAGNOSIS — E87.6 HYPOKALEMIA: Primary | ICD-10-CM

## 2021-06-15 DIAGNOSIS — E87.6 HYPOKALEMIA: ICD-10-CM

## 2021-06-15 DIAGNOSIS — D64.9 SYMPTOMATIC ANEMIA: ICD-10-CM

## 2021-06-15 DIAGNOSIS — D64.9 SYMPTOMATIC ANEMIA: Primary | ICD-10-CM

## 2021-06-15 DIAGNOSIS — R45.89 ANXIETY ABOUT HEALTH: ICD-10-CM

## 2021-06-15 PROCEDURE — 1101F PR PT FALLS ASSESS DOC 0-1 FALLS W/OUT INJ PAST YR: ICD-10-PCS | Mod: CPTII,S$GLB,, | Performed by: INTERNAL MEDICINE

## 2021-06-15 PROCEDURE — 3078F PR MOST RECENT DIASTOLIC BLOOD PRESSURE < 80 MM HG: ICD-10-PCS | Mod: CPTII,S$GLB,, | Performed by: INTERNAL MEDICINE

## 2021-06-15 PROCEDURE — 3074F PR MOST RECENT SYSTOLIC BLOOD PRESSURE < 130 MM HG: ICD-10-PCS | Mod: CPTII,S$GLB,, | Performed by: INTERNAL MEDICINE

## 2021-06-15 PROCEDURE — 96365 THER/PROPH/DIAG IV INF INIT: CPT | Mod: PN

## 2021-06-15 PROCEDURE — 1101F PT FALLS ASSESS-DOCD LE1/YR: CPT | Mod: CPTII,S$GLB,, | Performed by: INTERNAL MEDICINE

## 2021-06-15 PROCEDURE — 3288F PR FALLS RISK ASSESSMENT DOCUMENTED: ICD-10-PCS | Mod: CPTII,S$GLB,, | Performed by: INTERNAL MEDICINE

## 2021-06-15 PROCEDURE — 99215 PR OFFICE/OUTPT VISIT, EST, LEVL V, 40-54 MIN: ICD-10-PCS | Mod: S$GLB,,, | Performed by: INTERNAL MEDICINE

## 2021-06-15 PROCEDURE — 25000003 PHARM REV CODE 250: Mod: PN | Performed by: INTERNAL MEDICINE

## 2021-06-15 PROCEDURE — 63600175 PHARM REV CODE 636 W HCPCS: Mod: PN | Performed by: INTERNAL MEDICINE

## 2021-06-15 PROCEDURE — 3008F BODY MASS INDEX DOCD: CPT | Mod: CPTII,S$GLB,, | Performed by: INTERNAL MEDICINE

## 2021-06-15 PROCEDURE — 99999 PR PBB SHADOW E&M-EST. PATIENT-LVL IV: ICD-10-PCS | Mod: PBBFAC,,, | Performed by: INTERNAL MEDICINE

## 2021-06-15 PROCEDURE — 1159F PR MEDICATION LIST DOCUMENTED IN MEDICAL RECORD: ICD-10-PCS | Mod: S$GLB,,, | Performed by: INTERNAL MEDICINE

## 2021-06-15 PROCEDURE — 3074F SYST BP LT 130 MM HG: CPT | Mod: CPTII,S$GLB,, | Performed by: INTERNAL MEDICINE

## 2021-06-15 PROCEDURE — 3288F FALL RISK ASSESSMENT DOCD: CPT | Mod: CPTII,S$GLB,, | Performed by: INTERNAL MEDICINE

## 2021-06-15 PROCEDURE — 3078F DIAST BP <80 MM HG: CPT | Mod: CPTII,S$GLB,, | Performed by: INTERNAL MEDICINE

## 2021-06-15 PROCEDURE — 1111F PR DISCHARGE MEDS RECONCILED W/ CURRENT OUTPATIENT MED LIST: ICD-10-PCS | Mod: CPTII,S$GLB,, | Performed by: INTERNAL MEDICINE

## 2021-06-15 PROCEDURE — 1126F PR PAIN SEVERITY QUANTIFIED, NO PAIN PRESENT: ICD-10-PCS | Mod: S$GLB,,, | Performed by: INTERNAL MEDICINE

## 2021-06-15 PROCEDURE — 96366 THER/PROPH/DIAG IV INF ADDON: CPT | Mod: PN

## 2021-06-15 PROCEDURE — 1126F AMNT PAIN NOTED NONE PRSNT: CPT | Mod: S$GLB,,, | Performed by: INTERNAL MEDICINE

## 2021-06-15 PROCEDURE — 3008F PR BODY MASS INDEX (BMI) DOCUMENTED: ICD-10-PCS | Mod: CPTII,S$GLB,, | Performed by: INTERNAL MEDICINE

## 2021-06-15 PROCEDURE — 99215 OFFICE O/P EST HI 40 MIN: CPT | Mod: S$GLB,,, | Performed by: INTERNAL MEDICINE

## 2021-06-15 PROCEDURE — 1111F DSCHRG MED/CURRENT MED MERGE: CPT | Mod: CPTII,S$GLB,, | Performed by: INTERNAL MEDICINE

## 2021-06-15 PROCEDURE — A4216 STERILE WATER/SALINE, 10 ML: HCPCS | Mod: PN | Performed by: INTERNAL MEDICINE

## 2021-06-15 PROCEDURE — 1159F MED LIST DOCD IN RCRD: CPT | Mod: S$GLB,,, | Performed by: INTERNAL MEDICINE

## 2021-06-15 PROCEDURE — 99999 PR PBB SHADOW E&M-EST. PATIENT-LVL IV: CPT | Mod: PBBFAC,,, | Performed by: INTERNAL MEDICINE

## 2021-06-15 RX ORDER — DIPHENHYDRAMINE HCL 25 MG
25 CAPSULE ORAL ONCE
Status: CANCELLED | OUTPATIENT
Start: 2021-06-15

## 2021-06-15 RX ORDER — HEPARIN 100 UNIT/ML
500 SYRINGE INTRAVENOUS
Status: CANCELLED | OUTPATIENT
Start: 2021-06-15

## 2021-06-15 RX ORDER — FUROSEMIDE 10 MG/ML
20 INJECTION INTRAMUSCULAR; INTRAVENOUS ONCE
Status: CANCELLED | OUTPATIENT
Start: 2021-06-15

## 2021-06-15 RX ORDER — SODIUM CHLORIDE 0.9 % (FLUSH) 0.9 %
10 SYRINGE (ML) INJECTION
Status: CANCELLED | OUTPATIENT
Start: 2021-06-15

## 2021-06-15 RX ORDER — HYDROCODONE BITARTRATE AND ACETAMINOPHEN 500; 5 MG/1; MG/1
TABLET ORAL ONCE
Status: CANCELLED | OUTPATIENT
Start: 2021-06-15 | End: 2021-06-15

## 2021-06-15 RX ORDER — HEPARIN 100 UNIT/ML
500 SYRINGE INTRAVENOUS
Status: DISCONTINUED | OUTPATIENT
Start: 2021-06-15 | End: 2021-06-15 | Stop reason: HOSPADM

## 2021-06-15 RX ORDER — SODIUM CHLORIDE 0.9 % (FLUSH) 0.9 %
10 SYRINGE (ML) INJECTION
Status: DISCONTINUED | OUTPATIENT
Start: 2021-06-15 | End: 2021-06-15 | Stop reason: HOSPADM

## 2021-06-15 RX ORDER — ACETAMINOPHEN 325 MG/1
650 TABLET ORAL ONCE
Status: CANCELLED | OUTPATIENT
Start: 2021-06-15

## 2021-06-15 RX ORDER — POTASSIUM CHLORIDE 20 MEQ/1
20 TABLET, EXTENDED RELEASE ORAL 2 TIMES DAILY
Qty: 60 TABLET | Refills: 3 | Status: SHIPPED | OUTPATIENT
Start: 2021-06-15 | End: 2022-03-29

## 2021-06-15 RX ADMIN — POTASSIUM CHLORIDE: 2 INJECTION, SOLUTION, CONCENTRATE INTRAVENOUS at 11:06

## 2021-06-15 RX ADMIN — Medication 10 ML: at 11:06

## 2021-06-24 ENCOUNTER — DOCUMENT SCAN (OUTPATIENT)
Dept: HOME HEALTH SERVICES | Facility: HOSPITAL | Age: 76
End: 2021-06-24
Payer: MEDICARE

## 2021-07-01 ENCOUNTER — EXTERNAL HOME HEALTH (OUTPATIENT)
Dept: HOME HEALTH SERVICES | Facility: HOSPITAL | Age: 76
End: 2021-07-01
Payer: MEDICARE

## 2021-07-07 ENCOUNTER — PATIENT MESSAGE (OUTPATIENT)
Dept: HEMATOLOGY/ONCOLOGY | Facility: CLINIC | Age: 76
End: 2021-07-07

## 2021-07-09 RX ORDER — ONDANSETRON 4 MG/1
4 TABLET, ORALLY DISINTEGRATING ORAL EVERY 6 HOURS
COMMUNITY
Start: 2021-06-22

## 2021-07-14 ENCOUNTER — LAB VISIT (OUTPATIENT)
Dept: LAB | Facility: HOSPITAL | Age: 76
End: 2021-07-14
Attending: INTERNAL MEDICINE
Payer: MEDICARE

## 2021-07-14 DIAGNOSIS — R45.89 ANXIETY ABOUT HEALTH: ICD-10-CM

## 2021-07-14 DIAGNOSIS — G89.3 NEOPLASM RELATED PAIN: ICD-10-CM

## 2021-07-14 DIAGNOSIS — F32.0 CURRENT MILD EPISODE OF MAJOR DEPRESSIVE DISORDER WITHOUT PRIOR EPISODE: ICD-10-CM

## 2021-07-14 DIAGNOSIS — E87.6 HYPOKALEMIA: ICD-10-CM

## 2021-07-14 DIAGNOSIS — C45.0 MALIGNANT MESOTHELIOMA OF PLEURA: ICD-10-CM

## 2021-07-14 DIAGNOSIS — D64.9 SYMPTOMATIC ANEMIA: ICD-10-CM

## 2021-07-14 LAB
ALBUMIN SERPL BCP-MCNC: 2.9 G/DL (ref 3.5–5.2)
ALP SERPL-CCNC: 106 U/L (ref 55–135)
ALT SERPL W/O P-5'-P-CCNC: 11 U/L (ref 10–44)
ANION GAP SERPL CALC-SCNC: 13 MMOL/L (ref 8–16)
AST SERPL-CCNC: 16 U/L (ref 10–40)
BASOPHILS # BLD AUTO: 0.05 K/UL (ref 0–0.2)
BASOPHILS NFR BLD: 0.6 % (ref 0–1.9)
BILIRUB SERPL-MCNC: 0.4 MG/DL (ref 0.1–1)
BUN SERPL-MCNC: 15 MG/DL (ref 8–23)
CALCIUM SERPL-MCNC: 9.9 MG/DL (ref 8.7–10.5)
CHLORIDE SERPL-SCNC: 100 MMOL/L (ref 95–110)
CO2 SERPL-SCNC: 24 MMOL/L (ref 23–29)
CREAT SERPL-MCNC: 1 MG/DL (ref 0.5–1.4)
DIFFERENTIAL METHOD: ABNORMAL
EOSINOPHIL # BLD AUTO: 0.2 K/UL (ref 0–0.5)
EOSINOPHIL NFR BLD: 2.2 % (ref 0–8)
ERYTHROCYTE [DISTWIDTH] IN BLOOD BY AUTOMATED COUNT: 15.3 % (ref 11.5–14.5)
EST. GFR  (AFRICAN AMERICAN): >60 ML/MIN/1.73 M^2
EST. GFR  (NON AFRICAN AMERICAN): 55.6 ML/MIN/1.73 M^2
GLUCOSE SERPL-MCNC: 106 MG/DL (ref 70–110)
HCT VFR BLD AUTO: 31 % (ref 37–48.5)
HGB BLD-MCNC: 10 G/DL (ref 12–16)
IMM GRANULOCYTES # BLD AUTO: 0.03 K/UL (ref 0–0.04)
IMM GRANULOCYTES NFR BLD AUTO: 0.3 % (ref 0–0.5)
LDH SERPL L TO P-CCNC: 178 U/L (ref 110–260)
LYMPHOCYTES # BLD AUTO: 1.3 K/UL (ref 1–4.8)
LYMPHOCYTES NFR BLD: 14.4 % (ref 18–48)
MAGNESIUM SERPL-MCNC: 1.9 MG/DL (ref 1.6–2.6)
MCH RBC QN AUTO: 30.8 PG (ref 27–31)
MCHC RBC AUTO-ENTMCNC: 32.3 G/DL (ref 32–36)
MCV RBC AUTO: 95 FL (ref 82–98)
MONOCYTES # BLD AUTO: 1 K/UL (ref 0.3–1)
MONOCYTES NFR BLD: 11.4 % (ref 4–15)
NEUTROPHILS # BLD AUTO: 6.4 K/UL (ref 1.8–7.7)
NEUTROPHILS NFR BLD: 71.4 % (ref 38–73)
NRBC BLD-RTO: 0 /100 WBC
PLATELET # BLD AUTO: 273 K/UL (ref 150–450)
PMV BLD AUTO: 9.2 FL (ref 9.2–12.9)
POTASSIUM SERPL-SCNC: 4.5 MMOL/L (ref 3.5–5.1)
PROT SERPL-MCNC: 8.6 G/DL (ref 6–8.4)
RBC # BLD AUTO: 3.25 M/UL (ref 4–5.4)
SODIUM SERPL-SCNC: 137 MMOL/L (ref 136–145)
WBC # BLD AUTO: 8.96 K/UL (ref 3.9–12.7)

## 2021-07-14 PROCEDURE — 85025 COMPLETE CBC W/AUTO DIFF WBC: CPT | Mod: PO | Performed by: INTERNAL MEDICINE

## 2021-07-14 PROCEDURE — 83615 LACTATE (LD) (LDH) ENZYME: CPT | Performed by: INTERNAL MEDICINE

## 2021-07-14 PROCEDURE — 80053 COMPREHEN METABOLIC PANEL: CPT | Performed by: INTERNAL MEDICINE

## 2021-07-14 PROCEDURE — 36415 COLL VENOUS BLD VENIPUNCTURE: CPT | Mod: PO | Performed by: INTERNAL MEDICINE

## 2021-07-14 PROCEDURE — 83735 ASSAY OF MAGNESIUM: CPT | Performed by: INTERNAL MEDICINE

## 2021-07-14 RX ORDER — PAROXETINE HYDROCHLORIDE 20 MG/1
1 TABLET, FILM COATED ORAL EVERY MORNING
COMMUNITY
Start: 2020-09-18

## 2021-07-15 ENCOUNTER — PATIENT MESSAGE (OUTPATIENT)
Dept: HEMATOLOGY/ONCOLOGY | Facility: CLINIC | Age: 76
End: 2021-07-15

## 2021-07-15 ENCOUNTER — OFFICE VISIT (OUTPATIENT)
Dept: HEMATOLOGY/ONCOLOGY | Facility: CLINIC | Age: 76
End: 2021-07-15
Payer: MEDICARE

## 2021-07-15 ENCOUNTER — TELEPHONE (OUTPATIENT)
Dept: INFUSION THERAPY | Facility: HOSPITAL | Age: 76
End: 2021-07-15

## 2021-07-15 VITALS
RESPIRATION RATE: 18 BRPM | BODY MASS INDEX: 22.19 KG/M2 | SYSTOLIC BLOOD PRESSURE: 152 MMHG | TEMPERATURE: 95 F | HEIGHT: 63 IN | WEIGHT: 125.25 LBS | OXYGEN SATURATION: 98 % | HEART RATE: 93 BPM | DIASTOLIC BLOOD PRESSURE: 84 MMHG

## 2021-07-15 DIAGNOSIS — F32.0 CURRENT MILD EPISODE OF MAJOR DEPRESSIVE DISORDER WITHOUT PRIOR EPISODE: ICD-10-CM

## 2021-07-15 DIAGNOSIS — G89.3 NEOPLASM RELATED PAIN: ICD-10-CM

## 2021-07-15 DIAGNOSIS — K20.90 ESOPHAGITIS: ICD-10-CM

## 2021-07-15 DIAGNOSIS — R45.89 ANXIETY ABOUT HEALTH: ICD-10-CM

## 2021-07-15 DIAGNOSIS — C45.0 MALIGNANT MESOTHELIOMA OF PLEURA: Primary | ICD-10-CM

## 2021-07-15 PROCEDURE — 3288F PR FALLS RISK ASSESSMENT DOCUMENTED: ICD-10-PCS | Mod: CPTII,S$GLB,, | Performed by: INTERNAL MEDICINE

## 2021-07-15 PROCEDURE — 1159F MED LIST DOCD IN RCRD: CPT | Mod: S$GLB,,, | Performed by: INTERNAL MEDICINE

## 2021-07-15 PROCEDURE — 1159F PR MEDICATION LIST DOCUMENTED IN MEDICAL RECORD: ICD-10-PCS | Mod: S$GLB,,, | Performed by: INTERNAL MEDICINE

## 2021-07-15 PROCEDURE — 1126F PR PAIN SEVERITY QUANTIFIED, NO PAIN PRESENT: ICD-10-PCS | Mod: S$GLB,,, | Performed by: INTERNAL MEDICINE

## 2021-07-15 PROCEDURE — 1126F AMNT PAIN NOTED NONE PRSNT: CPT | Mod: S$GLB,,, | Performed by: INTERNAL MEDICINE

## 2021-07-15 PROCEDURE — 99214 PR OFFICE/OUTPT VISIT, EST, LEVL IV, 30-39 MIN: ICD-10-PCS | Mod: S$GLB,,, | Performed by: INTERNAL MEDICINE

## 2021-07-15 PROCEDURE — 99214 OFFICE O/P EST MOD 30 MIN: CPT | Mod: S$GLB,,, | Performed by: INTERNAL MEDICINE

## 2021-07-15 PROCEDURE — 1101F PT FALLS ASSESS-DOCD LE1/YR: CPT | Mod: CPTII,S$GLB,, | Performed by: INTERNAL MEDICINE

## 2021-07-15 PROCEDURE — 3008F BODY MASS INDEX DOCD: CPT | Mod: CPTII,S$GLB,, | Performed by: INTERNAL MEDICINE

## 2021-07-15 PROCEDURE — 99999 PR PBB SHADOW E&M-EST. PATIENT-LVL V: CPT | Mod: PBBFAC,,, | Performed by: INTERNAL MEDICINE

## 2021-07-15 PROCEDURE — 3288F FALL RISK ASSESSMENT DOCD: CPT | Mod: CPTII,S$GLB,, | Performed by: INTERNAL MEDICINE

## 2021-07-15 PROCEDURE — 99999 PR PBB SHADOW E&M-EST. PATIENT-LVL V: ICD-10-PCS | Mod: PBBFAC,,, | Performed by: INTERNAL MEDICINE

## 2021-07-15 PROCEDURE — 3008F PR BODY MASS INDEX (BMI) DOCUMENTED: ICD-10-PCS | Mod: CPTII,S$GLB,, | Performed by: INTERNAL MEDICINE

## 2021-07-15 PROCEDURE — 1101F PR PT FALLS ASSESS DOC 0-1 FALLS W/OUT INJ PAST YR: ICD-10-PCS | Mod: CPTII,S$GLB,, | Performed by: INTERNAL MEDICINE

## 2021-07-15 RX ORDER — SODIUM CHLORIDE 0.9 % (FLUSH) 0.9 %
10 SYRINGE (ML) INJECTION
Status: CANCELLED | OUTPATIENT
Start: 2021-07-26

## 2021-07-15 RX ORDER — LORAZEPAM 2 MG/ML
1 INJECTION INTRAMUSCULAR
Status: CANCELLED
Start: 2021-07-26 | End: 2021-07-26

## 2021-07-15 RX ORDER — CYANOCOBALAMIN 1000 UG/ML
1000 INJECTION, SOLUTION INTRAMUSCULAR; SUBCUTANEOUS ONCE
Status: CANCELLED
Start: 2021-07-26

## 2021-07-15 RX ORDER — HEPARIN 100 UNIT/ML
500 SYRINGE INTRAVENOUS
Status: CANCELLED | OUTPATIENT
Start: 2021-07-26

## 2021-07-26 ENCOUNTER — DOCUMENTATION ONLY (OUTPATIENT)
Dept: INFUSION THERAPY | Facility: HOSPITAL | Age: 76
End: 2021-07-26

## 2021-07-26 ENCOUNTER — INFUSION (OUTPATIENT)
Dept: INFUSION THERAPY | Facility: HOSPITAL | Age: 76
End: 2021-07-26
Attending: INTERNAL MEDICINE
Payer: MEDICARE

## 2021-07-26 VITALS
TEMPERATURE: 98 F | BODY MASS INDEX: 22.05 KG/M2 | HEART RATE: 93 BPM | HEIGHT: 63 IN | WEIGHT: 124.44 LBS | RESPIRATION RATE: 16 BRPM | SYSTOLIC BLOOD PRESSURE: 142 MMHG | DIASTOLIC BLOOD PRESSURE: 72 MMHG

## 2021-07-26 DIAGNOSIS — C45.0 MALIGNANT MESOTHELIOMA OF PLEURA: Primary | ICD-10-CM

## 2021-07-26 LAB
ANION GAP SERPL CALC-SCNC: 10 MMOL/L (ref 8–16)
BUN SERPL-MCNC: 15 MG/DL (ref 8–23)
CALCIUM SERPL-MCNC: 9.6 MG/DL (ref 8.7–10.5)
CHLORIDE SERPL-SCNC: 100 MMOL/L (ref 95–110)
CO2 SERPL-SCNC: 25 MMOL/L (ref 23–29)
CREAT SERPL-MCNC: 0.9 MG/DL (ref 0.5–1.4)
EST. GFR  (AFRICAN AMERICAN): >60 ML/MIN/1.73 M^2
EST. GFR  (NON AFRICAN AMERICAN): >60 ML/MIN/1.73 M^2
GLUCOSE SERPL-MCNC: 135 MG/DL (ref 70–110)
POTASSIUM SERPL-SCNC: 4.1 MMOL/L (ref 3.5–5.1)
SODIUM SERPL-SCNC: 135 MMOL/L (ref 136–145)

## 2021-07-26 PROCEDURE — 63600175 PHARM REV CODE 636 W HCPCS: Mod: PN | Performed by: INTERNAL MEDICINE

## 2021-07-26 PROCEDURE — 25000003 PHARM REV CODE 250: Mod: PN | Performed by: INTERNAL MEDICINE

## 2021-07-26 PROCEDURE — 96372 THER/PROPH/DIAG INJ SC/IM: CPT | Mod: PN

## 2021-07-26 PROCEDURE — 96409 CHEMO IV PUSH SNGL DRUG: CPT | Mod: PN

## 2021-07-26 PROCEDURE — 80048 BASIC METABOLIC PNL TOTAL CA: CPT | Mod: PN | Performed by: NURSE PRACTITIONER

## 2021-07-26 RX ORDER — HEPARIN 100 UNIT/ML
500 SYRINGE INTRAVENOUS
Status: DISCONTINUED | OUTPATIENT
Start: 2021-07-26 | End: 2021-07-26 | Stop reason: HOSPADM

## 2021-07-26 RX ORDER — LORAZEPAM 2 MG/ML
1 INJECTION INTRAMUSCULAR
Status: DISCONTINUED | OUTPATIENT
Start: 2021-07-26 | End: 2021-07-26 | Stop reason: HOSPADM

## 2021-07-26 RX ORDER — CYANOCOBALAMIN 1000 UG/ML
1000 INJECTION, SOLUTION INTRAMUSCULAR; SUBCUTANEOUS ONCE
Status: COMPLETED | OUTPATIENT
Start: 2021-07-26 | End: 2021-07-26

## 2021-07-26 RX ORDER — SODIUM CHLORIDE 0.9 % (FLUSH) 0.9 %
10 SYRINGE (ML) INJECTION
Status: DISCONTINUED | OUTPATIENT
Start: 2021-07-26 | End: 2021-07-26 | Stop reason: HOSPADM

## 2021-07-26 RX ADMIN — HEPARIN 500 UNITS: 100 SYRINGE at 11:07

## 2021-07-26 RX ADMIN — SODIUM CHLORIDE 800 MG: 9 INJECTION, SOLUTION INTRAVENOUS at 11:07

## 2021-07-26 RX ADMIN — CYANOCOBALAMIN 1000 MCG: 1000 INJECTION, SOLUTION INTRAMUSCULAR at 11:07

## 2021-08-04 ENCOUNTER — LAB VISIT (OUTPATIENT)
Dept: LAB | Facility: HOSPITAL | Age: 76
End: 2021-08-04
Attending: INTERNAL MEDICINE
Payer: MEDICARE

## 2021-08-04 DIAGNOSIS — C45.0 MALIGNANT MESOTHELIOMA OF PLEURA: ICD-10-CM

## 2021-08-04 LAB
ALBUMIN SERPL BCP-MCNC: 2.5 G/DL (ref 3.5–5.2)
ALP SERPL-CCNC: 203 U/L (ref 55–135)
ALT SERPL W/O P-5'-P-CCNC: 92 U/L (ref 10–44)
ANION GAP SERPL CALC-SCNC: 9 MMOL/L (ref 8–16)
AST SERPL-CCNC: 98 U/L (ref 10–40)
BASOPHILS # BLD AUTO: 0.01 K/UL (ref 0–0.2)
BASOPHILS NFR BLD: 0.3 % (ref 0–1.9)
BILIRUB SERPL-MCNC: 0.5 MG/DL (ref 0.1–1)
BUN SERPL-MCNC: 13 MG/DL (ref 8–23)
CALCIUM SERPL-MCNC: 9.4 MG/DL (ref 8.7–10.5)
CHLORIDE SERPL-SCNC: 96 MMOL/L (ref 95–110)
CO2 SERPL-SCNC: 23 MMOL/L (ref 23–29)
CREAT SERPL-MCNC: 1 MG/DL (ref 0.5–1.4)
DIFFERENTIAL METHOD: ABNORMAL
EOSINOPHIL # BLD AUTO: 0.1 K/UL (ref 0–0.5)
EOSINOPHIL NFR BLD: 1.5 % (ref 0–8)
ERYTHROCYTE [DISTWIDTH] IN BLOOD BY AUTOMATED COUNT: 14.4 % (ref 11.5–14.5)
EST. GFR  (AFRICAN AMERICAN): >60 ML/MIN/1.73 M^2
EST. GFR  (NON AFRICAN AMERICAN): 55.6 ML/MIN/1.73 M^2
GLUCOSE SERPL-MCNC: 120 MG/DL (ref 70–110)
HCT VFR BLD AUTO: 25.7 % (ref 37–48.5)
HGB BLD-MCNC: 8.4 G/DL (ref 12–16)
IMM GRANULOCYTES # BLD AUTO: 0.01 K/UL (ref 0–0.04)
IMM GRANULOCYTES NFR BLD AUTO: 0.3 % (ref 0–0.5)
LDH SERPL L TO P-CCNC: 278 U/L (ref 110–260)
LYMPHOCYTES # BLD AUTO: 0.8 K/UL (ref 1–4.8)
LYMPHOCYTES NFR BLD: 22.2 % (ref 18–48)
MAGNESIUM SERPL-MCNC: 1.8 MG/DL (ref 1.6–2.6)
MCH RBC QN AUTO: 30.9 PG (ref 27–31)
MCHC RBC AUTO-ENTMCNC: 32.7 G/DL (ref 32–36)
MCV RBC AUTO: 95 FL (ref 82–98)
MONOCYTES # BLD AUTO: 0.8 K/UL (ref 0.3–1)
MONOCYTES NFR BLD: 22.8 % (ref 4–15)
NEUTROPHILS # BLD AUTO: 1.8 K/UL (ref 1.8–7.7)
NEUTROPHILS NFR BLD: 53.2 % (ref 38–73)
NRBC BLD-RTO: 0 /100 WBC
PLATELET # BLD AUTO: 132 K/UL (ref 150–450)
PMV BLD AUTO: 9.5 FL (ref 9.2–12.9)
POTASSIUM SERPL-SCNC: 3.9 MMOL/L (ref 3.5–5.1)
PROT SERPL-MCNC: 8 G/DL (ref 6–8.4)
RBC # BLD AUTO: 2.72 M/UL (ref 4–5.4)
SODIUM SERPL-SCNC: 128 MMOL/L (ref 136–145)
WBC # BLD AUTO: 3.38 K/UL (ref 3.9–12.7)

## 2021-08-04 PROCEDURE — 80053 COMPREHEN METABOLIC PANEL: CPT | Performed by: INTERNAL MEDICINE

## 2021-08-04 PROCEDURE — 83615 LACTATE (LD) (LDH) ENZYME: CPT | Performed by: INTERNAL MEDICINE

## 2021-08-04 PROCEDURE — 85025 COMPLETE CBC W/AUTO DIFF WBC: CPT | Mod: PO | Performed by: INTERNAL MEDICINE

## 2021-08-04 PROCEDURE — 83735 ASSAY OF MAGNESIUM: CPT | Performed by: INTERNAL MEDICINE

## 2021-08-04 PROCEDURE — 36415 COLL VENOUS BLD VENIPUNCTURE: CPT | Mod: PO | Performed by: INTERNAL MEDICINE

## 2021-08-05 ENCOUNTER — OFFICE VISIT (OUTPATIENT)
Dept: HEMATOLOGY/ONCOLOGY | Facility: CLINIC | Age: 76
End: 2021-08-05
Payer: MEDICARE

## 2021-08-05 ENCOUNTER — HOSPITAL ENCOUNTER (OUTPATIENT)
Dept: RADIOLOGY | Facility: HOSPITAL | Age: 76
Discharge: HOME | End: 2021-08-05
Attending: INTERNAL MEDICINE
Payer: MEDICARE

## 2021-08-05 VITALS
RESPIRATION RATE: 18 BRPM | TEMPERATURE: 97 F | OXYGEN SATURATION: 97 % | HEIGHT: 69 IN | SYSTOLIC BLOOD PRESSURE: 125 MMHG | BODY MASS INDEX: 18.15 KG/M2 | WEIGHT: 122.56 LBS | HEART RATE: 108 BPM | DIASTOLIC BLOOD PRESSURE: 68 MMHG

## 2021-08-05 DIAGNOSIS — R45.89 ANXIETY ABOUT HEALTH: ICD-10-CM

## 2021-08-05 DIAGNOSIS — K20.90 ESOPHAGITIS: ICD-10-CM

## 2021-08-05 DIAGNOSIS — R53.83 FATIGUE, UNSPECIFIED TYPE: ICD-10-CM

## 2021-08-05 DIAGNOSIS — C45.0 MALIGNANT MESOTHELIOMA OF PLEURA: ICD-10-CM

## 2021-08-05 DIAGNOSIS — E87.1 HYPONATREMIA: ICD-10-CM

## 2021-08-05 DIAGNOSIS — G89.3 NEOPLASM RELATED PAIN: ICD-10-CM

## 2021-08-05 DIAGNOSIS — R74.01 TRANSAMINITIS: ICD-10-CM

## 2021-08-05 DIAGNOSIS — C45.0 MALIGNANT MESOTHELIOMA OF PLEURA: Primary | ICD-10-CM

## 2021-08-05 DIAGNOSIS — F32.0 CURRENT MILD EPISODE OF MAJOR DEPRESSIVE DISORDER WITHOUT PRIOR EPISODE: ICD-10-CM

## 2021-08-05 PROCEDURE — 99215 OFFICE O/P EST HI 40 MIN: CPT | Mod: S$GLB,,, | Performed by: INTERNAL MEDICINE

## 2021-08-05 PROCEDURE — 99999 PR PBB SHADOW E&M-EST. PATIENT-LVL V: CPT | Mod: PBBFAC,,, | Performed by: INTERNAL MEDICINE

## 2021-08-05 PROCEDURE — 71046 X-RAY EXAM CHEST 2 VIEWS: CPT | Mod: 26,,, | Performed by: RADIOLOGY

## 2021-08-05 PROCEDURE — 1126F PR PAIN SEVERITY QUANTIFIED, NO PAIN PRESENT: ICD-10-PCS | Mod: CPTII,S$GLB,, | Performed by: INTERNAL MEDICINE

## 2021-08-05 PROCEDURE — 3288F FALL RISK ASSESSMENT DOCD: CPT | Mod: CPTII,S$GLB,, | Performed by: INTERNAL MEDICINE

## 2021-08-05 PROCEDURE — 1160F PR REVIEW ALL MEDS BY PRESCRIBER/CLIN PHARMACIST DOCUMENTED: ICD-10-PCS | Mod: CPTII,S$GLB,, | Performed by: INTERNAL MEDICINE

## 2021-08-05 PROCEDURE — 1160F RVW MEDS BY RX/DR IN RCRD: CPT | Mod: CPTII,S$GLB,, | Performed by: INTERNAL MEDICINE

## 2021-08-05 PROCEDURE — 99499 RISK ADDL DX/OHS AUDIT: ICD-10-PCS | Mod: S$GLB,,, | Performed by: INTERNAL MEDICINE

## 2021-08-05 PROCEDURE — 3008F PR BODY MASS INDEX (BMI) DOCUMENTED: ICD-10-PCS | Mod: CPTII,S$GLB,, | Performed by: INTERNAL MEDICINE

## 2021-08-05 PROCEDURE — 71046 XR CHEST PA AND LATERAL: ICD-10-PCS | Mod: 26,,, | Performed by: RADIOLOGY

## 2021-08-05 PROCEDURE — 99999 PR PBB SHADOW E&M-EST. PATIENT-LVL V: ICD-10-PCS | Mod: PBBFAC,,, | Performed by: INTERNAL MEDICINE

## 2021-08-05 PROCEDURE — 1101F PT FALLS ASSESS-DOCD LE1/YR: CPT | Mod: CPTII,S$GLB,, | Performed by: INTERNAL MEDICINE

## 2021-08-05 PROCEDURE — 99215 PR OFFICE/OUTPT VISIT, EST, LEVL V, 40-54 MIN: ICD-10-PCS | Mod: S$GLB,,, | Performed by: INTERNAL MEDICINE

## 2021-08-05 PROCEDURE — 71046 X-RAY EXAM CHEST 2 VIEWS: CPT | Mod: TC,FY,PO

## 2021-08-05 PROCEDURE — 3078F PR MOST RECENT DIASTOLIC BLOOD PRESSURE < 80 MM HG: ICD-10-PCS | Mod: CPTII,S$GLB,, | Performed by: INTERNAL MEDICINE

## 2021-08-05 PROCEDURE — 1101F PR PT FALLS ASSESS DOC 0-1 FALLS W/OUT INJ PAST YR: ICD-10-PCS | Mod: CPTII,S$GLB,, | Performed by: INTERNAL MEDICINE

## 2021-08-05 PROCEDURE — 1159F MED LIST DOCD IN RCRD: CPT | Mod: CPTII,S$GLB,, | Performed by: INTERNAL MEDICINE

## 2021-08-05 PROCEDURE — 1159F PR MEDICATION LIST DOCUMENTED IN MEDICAL RECORD: ICD-10-PCS | Mod: CPTII,S$GLB,, | Performed by: INTERNAL MEDICINE

## 2021-08-05 PROCEDURE — 3074F SYST BP LT 130 MM HG: CPT | Mod: CPTII,S$GLB,, | Performed by: INTERNAL MEDICINE

## 2021-08-05 PROCEDURE — 99499 UNLISTED E&M SERVICE: CPT | Mod: S$GLB,,, | Performed by: INTERNAL MEDICINE

## 2021-08-05 PROCEDURE — 1126F AMNT PAIN NOTED NONE PRSNT: CPT | Mod: CPTII,S$GLB,, | Performed by: INTERNAL MEDICINE

## 2021-08-05 PROCEDURE — 3078F DIAST BP <80 MM HG: CPT | Mod: CPTII,S$GLB,, | Performed by: INTERNAL MEDICINE

## 2021-08-05 PROCEDURE — 3074F PR MOST RECENT SYSTOLIC BLOOD PRESSURE < 130 MM HG: ICD-10-PCS | Mod: CPTII,S$GLB,, | Performed by: INTERNAL MEDICINE

## 2021-08-05 PROCEDURE — 3288F PR FALLS RISK ASSESSMENT DOCUMENTED: ICD-10-PCS | Mod: CPTII,S$GLB,, | Performed by: INTERNAL MEDICINE

## 2021-08-05 PROCEDURE — 3008F BODY MASS INDEX DOCD: CPT | Mod: CPTII,S$GLB,, | Performed by: INTERNAL MEDICINE

## 2021-08-09 ENCOUNTER — TELEPHONE (OUTPATIENT)
Dept: INFUSION THERAPY | Facility: HOSPITAL | Age: 76
End: 2021-08-09

## 2021-08-09 DIAGNOSIS — C45.0 MALIGNANT MESOTHELIOMA OF PLEURA: Primary | ICD-10-CM

## 2021-08-10 DIAGNOSIS — C45.0 MALIGNANT MESOTHELIOMA OF PLEURA: Primary | ICD-10-CM

## 2021-08-12 ENCOUNTER — TELEPHONE (OUTPATIENT)
Dept: HEMATOLOGY/ONCOLOGY | Facility: CLINIC | Age: 76
End: 2021-08-12

## 2021-08-12 DIAGNOSIS — J90 PLEURAL EFFUSION, NOT ELSEWHERE CLASSIFIED: ICD-10-CM

## 2021-08-12 DIAGNOSIS — C45.0 MALIGNANT MESOTHELIOMA OF PLEURA: Primary | ICD-10-CM

## 2021-08-13 ENCOUNTER — HOSPITAL ENCOUNTER (OUTPATIENT)
Dept: RADIOLOGY | Facility: HOSPITAL | Age: 76
Discharge: HOME | End: 2021-08-13
Attending: INTERNAL MEDICINE
Payer: MEDICARE

## 2021-08-13 DIAGNOSIS — C45.0 MALIGNANT MESOTHELIOMA OF PLEURA: ICD-10-CM

## 2021-08-13 DIAGNOSIS — J90 PLEURAL EFFUSION, NOT ELSEWHERE CLASSIFIED: ICD-10-CM

## 2021-08-13 PROCEDURE — 71250 CT CHEST WITHOUT CONTRAST: ICD-10-PCS | Mod: 26,,, | Performed by: RADIOLOGY

## 2021-08-13 PROCEDURE — 71250 CT THORAX DX C-: CPT | Mod: TC,PO

## 2021-08-13 PROCEDURE — 71250 CT THORAX DX C-: CPT | Mod: 26,,, | Performed by: RADIOLOGY

## 2021-08-16 ENCOUNTER — TELEPHONE (OUTPATIENT)
Dept: INFUSION THERAPY | Facility: HOSPITAL | Age: 76
End: 2021-08-16

## 2021-08-16 ENCOUNTER — OFFICE VISIT (OUTPATIENT)
Dept: HEMATOLOGY/ONCOLOGY | Facility: CLINIC | Age: 76
End: 2021-08-16
Payer: MEDICARE

## 2021-08-16 ENCOUNTER — LAB VISIT (OUTPATIENT)
Dept: LAB | Facility: HOSPITAL | Age: 76
End: 2021-08-16
Attending: INTERNAL MEDICINE
Payer: MEDICARE

## 2021-08-16 ENCOUNTER — PATIENT MESSAGE (OUTPATIENT)
Dept: HEMATOLOGY/ONCOLOGY | Facility: CLINIC | Age: 76
End: 2021-08-16

## 2021-08-16 VITALS
SYSTOLIC BLOOD PRESSURE: 142 MMHG | RESPIRATION RATE: 18 BRPM | HEIGHT: 69 IN | DIASTOLIC BLOOD PRESSURE: 82 MMHG | TEMPERATURE: 98 F | HEART RATE: 113 BPM | OXYGEN SATURATION: 97 % | WEIGHT: 120.13 LBS | BODY MASS INDEX: 17.79 KG/M2

## 2021-08-16 DIAGNOSIS — C45.0 MALIGNANT MESOTHELIOMA OF PLEURA: ICD-10-CM

## 2021-08-16 DIAGNOSIS — F32.0 CURRENT MILD EPISODE OF MAJOR DEPRESSIVE DISORDER WITHOUT PRIOR EPISODE: ICD-10-CM

## 2021-08-16 DIAGNOSIS — E87.1 HYPONATREMIA: ICD-10-CM

## 2021-08-16 DIAGNOSIS — R74.01 TRANSAMINITIS: ICD-10-CM

## 2021-08-16 DIAGNOSIS — C45.0 MALIGNANT MESOTHELIOMA OF PLEURA: Primary | ICD-10-CM

## 2021-08-16 DIAGNOSIS — K20.90 ESOPHAGITIS: ICD-10-CM

## 2021-08-16 DIAGNOSIS — G89.3 NEOPLASM RELATED PAIN: ICD-10-CM

## 2021-08-16 DIAGNOSIS — R45.89 ANXIETY ABOUT HEALTH: ICD-10-CM

## 2021-08-16 DIAGNOSIS — T45.1X5A ANTINEOPLASTIC CHEMOTHERAPY INDUCED ANEMIA: ICD-10-CM

## 2021-08-16 DIAGNOSIS — D64.81 ANTINEOPLASTIC CHEMOTHERAPY INDUCED ANEMIA: ICD-10-CM

## 2021-08-16 LAB
T4 FREE SERPL-MCNC: 0.95 NG/DL (ref 0.71–1.51)
TSH SERPL DL<=0.005 MIU/L-ACNC: 1.53 UIU/ML (ref 0.4–4)

## 2021-08-16 PROCEDURE — 3077F SYST BP >= 140 MM HG: CPT | Mod: CPTII,S$GLB,, | Performed by: INTERNAL MEDICINE

## 2021-08-16 PROCEDURE — 99215 OFFICE O/P EST HI 40 MIN: CPT | Mod: S$GLB,,, | Performed by: INTERNAL MEDICINE

## 2021-08-16 PROCEDURE — 3079F DIAST BP 80-89 MM HG: CPT | Mod: CPTII,S$GLB,, | Performed by: INTERNAL MEDICINE

## 2021-08-16 PROCEDURE — 1160F PR REVIEW ALL MEDS BY PRESCRIBER/CLIN PHARMACIST DOCUMENTED: ICD-10-PCS | Mod: CPTII,S$GLB,, | Performed by: INTERNAL MEDICINE

## 2021-08-16 PROCEDURE — 3079F PR MOST RECENT DIASTOLIC BLOOD PRESSURE 80-89 MM HG: ICD-10-PCS | Mod: CPTII,S$GLB,, | Performed by: INTERNAL MEDICINE

## 2021-08-16 PROCEDURE — 1160F RVW MEDS BY RX/DR IN RCRD: CPT | Mod: CPTII,S$GLB,, | Performed by: INTERNAL MEDICINE

## 2021-08-16 PROCEDURE — 84439 ASSAY OF FREE THYROXINE: CPT | Performed by: INTERNAL MEDICINE

## 2021-08-16 PROCEDURE — 3288F PR FALLS RISK ASSESSMENT DOCUMENTED: ICD-10-PCS | Mod: CPTII,S$GLB,, | Performed by: INTERNAL MEDICINE

## 2021-08-16 PROCEDURE — 1159F PR MEDICATION LIST DOCUMENTED IN MEDICAL RECORD: ICD-10-PCS | Mod: CPTII,S$GLB,, | Performed by: INTERNAL MEDICINE

## 2021-08-16 PROCEDURE — 1126F PR PAIN SEVERITY QUANTIFIED, NO PAIN PRESENT: ICD-10-PCS | Mod: CPTII,S$GLB,, | Performed by: INTERNAL MEDICINE

## 2021-08-16 PROCEDURE — 84443 ASSAY THYROID STIM HORMONE: CPT | Performed by: INTERNAL MEDICINE

## 2021-08-16 PROCEDURE — 1126F AMNT PAIN NOTED NONE PRSNT: CPT | Mod: CPTII,S$GLB,, | Performed by: INTERNAL MEDICINE

## 2021-08-16 PROCEDURE — 3288F FALL RISK ASSESSMENT DOCD: CPT | Mod: CPTII,S$GLB,, | Performed by: INTERNAL MEDICINE

## 2021-08-16 PROCEDURE — 1101F PR PT FALLS ASSESS DOC 0-1 FALLS W/OUT INJ PAST YR: ICD-10-PCS | Mod: CPTII,S$GLB,, | Performed by: INTERNAL MEDICINE

## 2021-08-16 PROCEDURE — 3008F BODY MASS INDEX DOCD: CPT | Mod: CPTII,S$GLB,, | Performed by: INTERNAL MEDICINE

## 2021-08-16 PROCEDURE — 36415 COLL VENOUS BLD VENIPUNCTURE: CPT | Mod: PN | Performed by: INTERNAL MEDICINE

## 2021-08-16 PROCEDURE — 3077F PR MOST RECENT SYSTOLIC BLOOD PRESSURE >= 140 MM HG: ICD-10-PCS | Mod: CPTII,S$GLB,, | Performed by: INTERNAL MEDICINE

## 2021-08-16 PROCEDURE — 99215 PR OFFICE/OUTPT VISIT, EST, LEVL V, 40-54 MIN: ICD-10-PCS | Mod: S$GLB,,, | Performed by: INTERNAL MEDICINE

## 2021-08-16 PROCEDURE — 99999 PR PBB SHADOW E&M-EST. PATIENT-LVL V: ICD-10-PCS | Mod: PBBFAC,,, | Performed by: INTERNAL MEDICINE

## 2021-08-16 PROCEDURE — 3008F PR BODY MASS INDEX (BMI) DOCUMENTED: ICD-10-PCS | Mod: CPTII,S$GLB,, | Performed by: INTERNAL MEDICINE

## 2021-08-16 PROCEDURE — 99999 PR PBB SHADOW E&M-EST. PATIENT-LVL V: CPT | Mod: PBBFAC,,, | Performed by: INTERNAL MEDICINE

## 2021-08-16 PROCEDURE — 1101F PT FALLS ASSESS-DOCD LE1/YR: CPT | Mod: CPTII,S$GLB,, | Performed by: INTERNAL MEDICINE

## 2021-08-16 PROCEDURE — 1159F MED LIST DOCD IN RCRD: CPT | Mod: CPTII,S$GLB,, | Performed by: INTERNAL MEDICINE

## 2021-08-16 RX ORDER — SODIUM CHLORIDE 0.9 % (FLUSH) 0.9 %
10 SYRINGE (ML) INJECTION
Status: CANCELLED | OUTPATIENT
Start: 2021-09-09

## 2021-08-16 RX ORDER — HEPARIN 100 UNIT/ML
500 SYRINGE INTRAVENOUS
Status: CANCELLED | OUTPATIENT
Start: 2021-09-09

## 2021-08-16 RX ORDER — ACETAMINOPHEN 500 MG
1000 TABLET ORAL
Status: CANCELLED
Start: 2021-09-09

## 2021-08-17 ENCOUNTER — TELEPHONE (OUTPATIENT)
Dept: HEMATOLOGY/ONCOLOGY | Facility: CLINIC | Age: 76
End: 2021-08-17

## 2021-08-27 ENCOUNTER — TELEPHONE (OUTPATIENT)
Dept: INFUSION THERAPY | Facility: HOSPITAL | Age: 76
End: 2021-08-27

## 2021-08-27 ENCOUNTER — TELEPHONE (OUTPATIENT)
Dept: HEMATOLOGY/ONCOLOGY | Facility: CLINIC | Age: 76
End: 2021-08-27

## 2021-08-27 DIAGNOSIS — C45.0 MALIGNANT MESOTHELIOMA OF PLEURA: Primary | ICD-10-CM

## 2021-08-27 DIAGNOSIS — R53.83 FATIGUE, UNSPECIFIED TYPE: ICD-10-CM

## 2021-08-27 DIAGNOSIS — G89.3 NEOPLASM RELATED PAIN: ICD-10-CM

## 2021-08-30 ENCOUNTER — PATIENT MESSAGE (OUTPATIENT)
Dept: INFUSION THERAPY | Facility: HOSPITAL | Age: 76
End: 2021-08-30

## 2021-09-02 ENCOUNTER — TELEPHONE (OUTPATIENT)
Dept: HEMATOLOGY/ONCOLOGY | Facility: CLINIC | Age: 76
End: 2021-09-02

## 2021-09-08 ENCOUNTER — DOCUMENTATION ONLY (OUTPATIENT)
Dept: PHARMACY | Facility: AMBULARY SURGERY CENTER | Age: 76
End: 2021-09-08

## 2021-09-08 ENCOUNTER — DOCUMENTATION ONLY (OUTPATIENT)
Dept: HEMATOLOGY/ONCOLOGY | Facility: CLINIC | Age: 76
End: 2021-09-08

## 2021-09-08 ENCOUNTER — LAB VISIT (OUTPATIENT)
Dept: LAB | Facility: HOSPITAL | Age: 76
End: 2021-09-08
Attending: INTERNAL MEDICINE
Payer: MEDICARE

## 2021-09-08 DIAGNOSIS — C45.0 MALIGNANT MESOTHELIOMA OF PLEURA: ICD-10-CM

## 2021-09-08 LAB
ALBUMIN SERPL BCP-MCNC: 2.9 G/DL (ref 3.5–5.2)
ALP SERPL-CCNC: 131 U/L (ref 55–135)
ALT SERPL W/O P-5'-P-CCNC: 12 U/L (ref 10–44)
ANION GAP SERPL CALC-SCNC: 11 MMOL/L (ref 8–16)
AST SERPL-CCNC: 20 U/L (ref 10–40)
BASOPHILS # BLD AUTO: 0.09 K/UL (ref 0–0.2)
BASOPHILS NFR BLD: 0.7 % (ref 0–1.9)
BILIRUB SERPL-MCNC: 0.3 MG/DL (ref 0.1–1)
BUN SERPL-MCNC: 20 MG/DL (ref 8–23)
CALCIUM SERPL-MCNC: 10.5 MG/DL (ref 8.7–10.5)
CHLORIDE SERPL-SCNC: 100 MMOL/L (ref 95–110)
CO2 SERPL-SCNC: 24 MMOL/L (ref 23–29)
CREAT SERPL-MCNC: 1.3 MG/DL (ref 0.5–1.4)
DIFFERENTIAL METHOD: ABNORMAL
EOSINOPHIL # BLD AUTO: 0.2 K/UL (ref 0–0.5)
EOSINOPHIL NFR BLD: 1.6 % (ref 0–8)
ERYTHROCYTE [DISTWIDTH] IN BLOOD BY AUTOMATED COUNT: 15.6 % (ref 11.5–14.5)
EST. GFR  (AFRICAN AMERICAN): 46 ML/MIN/1.73 M^2
EST. GFR  (NON AFRICAN AMERICAN): 40 ML/MIN/1.73 M^2
GLUCOSE SERPL-MCNC: 161 MG/DL (ref 70–110)
HCT VFR BLD AUTO: 29.6 % (ref 37–48.5)
HGB BLD-MCNC: 9.5 G/DL (ref 12–16)
IMM GRANULOCYTES # BLD AUTO: 0.06 K/UL (ref 0–0.04)
IMM GRANULOCYTES NFR BLD AUTO: 0.4 % (ref 0–0.5)
LDH SERPL L TO P-CCNC: 216 U/L (ref 110–260)
LYMPHOCYTES # BLD AUTO: 1.4 K/UL (ref 1–4.8)
LYMPHOCYTES NFR BLD: 10.2 % (ref 18–48)
MAGNESIUM SERPL-MCNC: 2 MG/DL (ref 1.6–2.6)
MCH RBC QN AUTO: 32.8 PG (ref 27–31)
MCHC RBC AUTO-ENTMCNC: 32.1 G/DL (ref 32–36)
MCV RBC AUTO: 102 FL (ref 82–98)
MONOCYTES # BLD AUTO: 1.2 K/UL (ref 0.3–1)
MONOCYTES NFR BLD: 9.1 % (ref 4–15)
NEUTROPHILS # BLD AUTO: 10.5 K/UL (ref 1.8–7.7)
NEUTROPHILS NFR BLD: 78 % (ref 38–73)
NRBC BLD-RTO: 0 /100 WBC
PLATELET # BLD AUTO: 352 K/UL (ref 150–450)
PMV BLD AUTO: 9.5 FL (ref 9.2–12.9)
POTASSIUM SERPL-SCNC: 4.6 MMOL/L (ref 3.5–5.1)
PROT SERPL-MCNC: 9.2 G/DL (ref 6–8.4)
RBC # BLD AUTO: 2.9 M/UL (ref 4–5.4)
SODIUM SERPL-SCNC: 135 MMOL/L (ref 136–145)
WBC # BLD AUTO: 13.48 K/UL (ref 3.9–12.7)

## 2021-09-08 PROCEDURE — 83735 ASSAY OF MAGNESIUM: CPT | Mod: PN | Performed by: INTERNAL MEDICINE

## 2021-09-08 PROCEDURE — 36415 COLL VENOUS BLD VENIPUNCTURE: CPT | Mod: PN | Performed by: INTERNAL MEDICINE

## 2021-09-08 PROCEDURE — 83615 LACTATE (LD) (LDH) ENZYME: CPT | Mod: PN | Performed by: INTERNAL MEDICINE

## 2021-09-08 PROCEDURE — 80053 COMPREHEN METABOLIC PANEL: CPT | Mod: PN | Performed by: INTERNAL MEDICINE

## 2021-09-08 PROCEDURE — 85025 COMPLETE CBC W/AUTO DIFF WBC: CPT | Mod: PN | Performed by: INTERNAL MEDICINE

## 2021-09-09 ENCOUNTER — PATIENT MESSAGE (OUTPATIENT)
Dept: HEMATOLOGY/ONCOLOGY | Facility: CLINIC | Age: 76
End: 2021-09-09

## 2021-09-09 ENCOUNTER — OFFICE VISIT (OUTPATIENT)
Dept: HEMATOLOGY/ONCOLOGY | Facility: CLINIC | Age: 76
End: 2021-09-09
Payer: MEDICARE

## 2021-09-09 ENCOUNTER — DOCUMENTATION ONLY (OUTPATIENT)
Dept: INFUSION THERAPY | Facility: HOSPITAL | Age: 76
End: 2021-09-09

## 2021-09-09 ENCOUNTER — INFUSION (OUTPATIENT)
Dept: INFUSION THERAPY | Facility: HOSPITAL | Age: 76
End: 2021-09-09
Attending: INTERNAL MEDICINE
Payer: MEDICARE

## 2021-09-09 VITALS
HEART RATE: 102 BPM | SYSTOLIC BLOOD PRESSURE: 156 MMHG | HEIGHT: 69 IN | DIASTOLIC BLOOD PRESSURE: 85 MMHG | OXYGEN SATURATION: 98 % | TEMPERATURE: 97 F | WEIGHT: 119.06 LBS | BODY MASS INDEX: 17.63 KG/M2 | RESPIRATION RATE: 18 BRPM

## 2021-09-09 VITALS
OXYGEN SATURATION: 98 % | SYSTOLIC BLOOD PRESSURE: 150 MMHG | TEMPERATURE: 97 F | HEART RATE: 68 BPM | DIASTOLIC BLOOD PRESSURE: 74 MMHG | HEIGHT: 69 IN | RESPIRATION RATE: 16 BRPM | BODY MASS INDEX: 17.63 KG/M2 | WEIGHT: 119.06 LBS

## 2021-09-09 DIAGNOSIS — C45.0 MALIGNANT MESOTHELIOMA OF PLEURA: Primary | ICD-10-CM

## 2021-09-09 DIAGNOSIS — G89.3 NEOPLASM RELATED PAIN: ICD-10-CM

## 2021-09-09 DIAGNOSIS — F32.0 CURRENT MILD EPISODE OF MAJOR DEPRESSIVE DISORDER WITHOUT PRIOR EPISODE: ICD-10-CM

## 2021-09-09 DIAGNOSIS — R53.83 FATIGUE, UNSPECIFIED TYPE: ICD-10-CM

## 2021-09-09 DIAGNOSIS — R45.89 ANXIETY ABOUT HEALTH: ICD-10-CM

## 2021-09-09 PROCEDURE — 3079F DIAST BP 80-89 MM HG: CPT | Mod: CPTII,S$GLB,, | Performed by: INTERNAL MEDICINE

## 2021-09-09 PROCEDURE — 1160F RVW MEDS BY RX/DR IN RCRD: CPT | Mod: CPTII,S$GLB,, | Performed by: INTERNAL MEDICINE

## 2021-09-09 PROCEDURE — 1125F AMNT PAIN NOTED PAIN PRSNT: CPT | Mod: CPTII,S$GLB,, | Performed by: INTERNAL MEDICINE

## 2021-09-09 PROCEDURE — 4010F PR ACE/ARB THEARPY RXD/TAKEN: ICD-10-PCS | Mod: CPTII,S$GLB,, | Performed by: INTERNAL MEDICINE

## 2021-09-09 PROCEDURE — 96375 TX/PRO/DX INJ NEW DRUG ADDON: CPT | Mod: PN

## 2021-09-09 PROCEDURE — 3288F PR FALLS RISK ASSESSMENT DOCUMENTED: ICD-10-PCS | Mod: CPTII,S$GLB,, | Performed by: INTERNAL MEDICINE

## 2021-09-09 PROCEDURE — 99999 PR PBB SHADOW E&M-EST. PATIENT-LVL V: CPT | Mod: PBBFAC,,, | Performed by: INTERNAL MEDICINE

## 2021-09-09 PROCEDURE — 3079F PR MOST RECENT DIASTOLIC BLOOD PRESSURE 80-89 MM HG: ICD-10-PCS | Mod: CPTII,S$GLB,, | Performed by: INTERNAL MEDICINE

## 2021-09-09 PROCEDURE — 3077F PR MOST RECENT SYSTOLIC BLOOD PRESSURE >= 140 MM HG: ICD-10-PCS | Mod: CPTII,S$GLB,, | Performed by: INTERNAL MEDICINE

## 2021-09-09 PROCEDURE — 25000003 PHARM REV CODE 250: Mod: PN | Performed by: INTERNAL MEDICINE

## 2021-09-09 PROCEDURE — 1159F PR MEDICATION LIST DOCUMENTED IN MEDICAL RECORD: ICD-10-PCS | Mod: CPTII,S$GLB,, | Performed by: INTERNAL MEDICINE

## 2021-09-09 PROCEDURE — 1160F PR REVIEW ALL MEDS BY PRESCRIBER/CLIN PHARMACIST DOCUMENTED: ICD-10-PCS | Mod: CPTII,S$GLB,, | Performed by: INTERNAL MEDICINE

## 2021-09-09 PROCEDURE — 1125F PR PAIN SEVERITY QUANTIFIED, PAIN PRESENT: ICD-10-PCS | Mod: CPTII,S$GLB,, | Performed by: INTERNAL MEDICINE

## 2021-09-09 PROCEDURE — 1101F PT FALLS ASSESS-DOCD LE1/YR: CPT | Mod: CPTII,S$GLB,, | Performed by: INTERNAL MEDICINE

## 2021-09-09 PROCEDURE — 63600175 PHARM REV CODE 636 W HCPCS: Mod: PN | Performed by: INTERNAL MEDICINE

## 2021-09-09 PROCEDURE — 96413 CHEMO IV INFUSION 1 HR: CPT | Mod: PN

## 2021-09-09 PROCEDURE — 1101F PR PT FALLS ASSESS DOC 0-1 FALLS W/OUT INJ PAST YR: ICD-10-PCS | Mod: CPTII,S$GLB,, | Performed by: INTERNAL MEDICINE

## 2021-09-09 PROCEDURE — 3077F SYST BP >= 140 MM HG: CPT | Mod: CPTII,S$GLB,, | Performed by: INTERNAL MEDICINE

## 2021-09-09 PROCEDURE — 96367 TX/PROPH/DG ADDL SEQ IV INF: CPT | Mod: PN

## 2021-09-09 PROCEDURE — 99999 PR PBB SHADOW E&M-EST. PATIENT-LVL V: ICD-10-PCS | Mod: PBBFAC,,, | Performed by: INTERNAL MEDICINE

## 2021-09-09 PROCEDURE — 99215 OFFICE O/P EST HI 40 MIN: CPT | Mod: S$GLB,,, | Performed by: INTERNAL MEDICINE

## 2021-09-09 PROCEDURE — 99215 PR OFFICE/OUTPT VISIT, EST, LEVL V, 40-54 MIN: ICD-10-PCS | Mod: S$GLB,,, | Performed by: INTERNAL MEDICINE

## 2021-09-09 PROCEDURE — 1159F MED LIST DOCD IN RCRD: CPT | Mod: CPTII,S$GLB,, | Performed by: INTERNAL MEDICINE

## 2021-09-09 PROCEDURE — 4010F ACE/ARB THERAPY RXD/TAKEN: CPT | Mod: CPTII,S$GLB,, | Performed by: INTERNAL MEDICINE

## 2021-09-09 PROCEDURE — A4216 STERILE WATER/SALINE, 10 ML: HCPCS | Mod: PN | Performed by: INTERNAL MEDICINE

## 2021-09-09 PROCEDURE — 3288F FALL RISK ASSESSMENT DOCD: CPT | Mod: CPTII,S$GLB,, | Performed by: INTERNAL MEDICINE

## 2021-09-09 RX ORDER — ACETAMINOPHEN 500 MG
1000 TABLET ORAL
Status: COMPLETED | OUTPATIENT
Start: 2021-09-09 | End: 2021-09-09

## 2021-09-09 RX ORDER — SODIUM CHLORIDE 0.9 % (FLUSH) 0.9 %
10 SYRINGE (ML) INJECTION
Status: DISCONTINUED | OUTPATIENT
Start: 2021-09-09 | End: 2021-09-09 | Stop reason: HOSPADM

## 2021-09-09 RX ORDER — HEPARIN 100 UNIT/ML
500 SYRINGE INTRAVENOUS
Status: DISCONTINUED | OUTPATIENT
Start: 2021-09-09 | End: 2021-09-09 | Stop reason: HOSPADM

## 2021-09-09 RX ADMIN — EPOETIN ALFA-EPBX 60000 UNITS: 20000 INJECTION, SOLUTION INTRAVENOUS; SUBCUTANEOUS at 02:09

## 2021-09-09 RX ADMIN — ACETAMINOPHEN 1000 MG: 500 TABLET, FILM COATED ORAL at 02:09

## 2021-09-09 RX ADMIN — SODIUM CHLORIDE: 0.9 INJECTION, SOLUTION INTRAVENOUS at 02:09

## 2021-09-09 RX ADMIN — DIPHENHYDRAMINE HYDROCHLORIDE 25 MG: 50 INJECTION INTRAMUSCULAR; INTRAVENOUS at 02:09

## 2021-09-09 RX ADMIN — HEPARIN 500 UNITS: 100 SYRINGE at 03:09

## 2021-09-09 RX ADMIN — Medication 10 ML: at 02:09

## 2021-09-09 RX ADMIN — SODIUM CHLORIDE 200 MG: 9 INJECTION, SOLUTION INTRAVENOUS at 02:09

## 2021-09-28 ENCOUNTER — LAB VISIT (OUTPATIENT)
Dept: LAB | Facility: HOSPITAL | Age: 76
End: 2021-09-28
Attending: FAMILY MEDICINE
Payer: MEDICARE

## 2021-09-28 DIAGNOSIS — R53.83 FATIGUE, UNSPECIFIED TYPE: ICD-10-CM

## 2021-09-28 DIAGNOSIS — C45.0 MALIGNANT MESOTHELIOMA OF PLEURA: ICD-10-CM

## 2021-09-28 DIAGNOSIS — G89.3 NEOPLASM RELATED PAIN: ICD-10-CM

## 2021-09-28 DIAGNOSIS — C45.0 MALIGNANT MESOTHELIOMA OF PLEURA: Primary | ICD-10-CM

## 2021-09-28 LAB
ALBUMIN SERPL BCP-MCNC: 2.8 G/DL (ref 3.5–5.2)
ALP SERPL-CCNC: 166 U/L (ref 55–135)
ALT SERPL W/O P-5'-P-CCNC: 17 U/L (ref 10–44)
ANION GAP SERPL CALC-SCNC: 14 MMOL/L (ref 8–16)
AST SERPL-CCNC: 20 U/L (ref 10–40)
BASOPHILS # BLD AUTO: 0.06 K/UL (ref 0–0.2)
BASOPHILS NFR BLD: 0.7 % (ref 0–1.9)
BILIRUB SERPL-MCNC: 0.4 MG/DL (ref 0.1–1)
BUN SERPL-MCNC: 19 MG/DL (ref 8–23)
CALCIUM SERPL-MCNC: 10.4 MG/DL (ref 8.7–10.5)
CHLORIDE SERPL-SCNC: 97 MMOL/L (ref 95–110)
CO2 SERPL-SCNC: 23 MMOL/L (ref 23–29)
CREAT SERPL-MCNC: 1.3 MG/DL (ref 0.5–1.4)
DIFFERENTIAL METHOD: ABNORMAL
EOSINOPHIL # BLD AUTO: 0.2 K/UL (ref 0–0.5)
EOSINOPHIL NFR BLD: 2.1 % (ref 0–8)
ERYTHROCYTE [DISTWIDTH] IN BLOOD BY AUTOMATED COUNT: 14.6 % (ref 11.5–14.5)
EST. GFR  (AFRICAN AMERICAN): 46.4 ML/MIN/1.73 M^2
EST. GFR  (NON AFRICAN AMERICAN): 40.2 ML/MIN/1.73 M^2
GLUCOSE SERPL-MCNC: 129 MG/DL (ref 70–110)
HCT VFR BLD AUTO: 34.7 % (ref 37–48.5)
HGB BLD-MCNC: 10.7 G/DL (ref 12–16)
IMM GRANULOCYTES # BLD AUTO: 0.04 K/UL (ref 0–0.04)
IMM GRANULOCYTES NFR BLD AUTO: 0.4 % (ref 0–0.5)
LDH SERPL L TO P-CCNC: 152 U/L (ref 110–260)
LYMPHOCYTES # BLD AUTO: 1.1 K/UL (ref 1–4.8)
LYMPHOCYTES NFR BLD: 11.8 % (ref 18–48)
MAGNESIUM SERPL-MCNC: 2 MG/DL (ref 1.6–2.6)
MCH RBC QN AUTO: 30.7 PG (ref 27–31)
MCHC RBC AUTO-ENTMCNC: 30.8 G/DL (ref 32–36)
MCV RBC AUTO: 99 FL (ref 82–98)
MONOCYTES # BLD AUTO: 1.1 K/UL (ref 0.3–1)
MONOCYTES NFR BLD: 12.1 % (ref 4–15)
NEUTROPHILS # BLD AUTO: 6.8 K/UL (ref 1.8–7.7)
NEUTROPHILS NFR BLD: 73.3 % (ref 38–73)
NRBC BLD-RTO: 0 /100 WBC
PLATELET # BLD AUTO: 407 K/UL (ref 150–450)
PMV BLD AUTO: 9.5 FL (ref 9.2–12.9)
POTASSIUM SERPL-SCNC: 4.6 MMOL/L (ref 3.5–5.1)
PROT SERPL-MCNC: 8.8 G/DL (ref 6–8.4)
RBC # BLD AUTO: 3.49 M/UL (ref 4–5.4)
SODIUM SERPL-SCNC: 134 MMOL/L (ref 136–145)
WBC # BLD AUTO: 9.2 K/UL (ref 3.9–12.7)

## 2021-09-28 PROCEDURE — 85025 COMPLETE CBC W/AUTO DIFF WBC: CPT | Mod: PO | Performed by: INTERNAL MEDICINE

## 2021-09-28 PROCEDURE — 83615 LACTATE (LD) (LDH) ENZYME: CPT | Performed by: INTERNAL MEDICINE

## 2021-09-28 PROCEDURE — 83735 ASSAY OF MAGNESIUM: CPT | Performed by: INTERNAL MEDICINE

## 2021-09-28 PROCEDURE — 80053 COMPREHEN METABOLIC PANEL: CPT | Performed by: INTERNAL MEDICINE

## 2021-09-28 PROCEDURE — 36415 COLL VENOUS BLD VENIPUNCTURE: CPT | Mod: PO | Performed by: INTERNAL MEDICINE

## 2021-09-29 ENCOUNTER — DOCUMENTATION ONLY (OUTPATIENT)
Dept: INFUSION THERAPY | Facility: HOSPITAL | Age: 76
End: 2021-09-29

## 2021-09-29 ENCOUNTER — INFUSION (OUTPATIENT)
Dept: INFUSION THERAPY | Facility: HOSPITAL | Age: 76
End: 2021-09-29
Attending: INTERNAL MEDICINE
Payer: MEDICARE

## 2021-09-29 ENCOUNTER — OFFICE VISIT (OUTPATIENT)
Dept: HEMATOLOGY/ONCOLOGY | Facility: CLINIC | Age: 76
End: 2021-09-29
Payer: MEDICARE

## 2021-09-29 ENCOUNTER — PATIENT MESSAGE (OUTPATIENT)
Dept: HEMATOLOGY/ONCOLOGY | Facility: CLINIC | Age: 76
End: 2021-09-29

## 2021-09-29 VITALS
BODY MASS INDEX: 17.11 KG/M2 | HEART RATE: 113 BPM | SYSTOLIC BLOOD PRESSURE: 145 MMHG | TEMPERATURE: 97 F | RESPIRATION RATE: 18 BRPM | DIASTOLIC BLOOD PRESSURE: 80 MMHG | HEIGHT: 69 IN | OXYGEN SATURATION: 93 % | WEIGHT: 115.5 LBS

## 2021-09-29 VITALS
RESPIRATION RATE: 18 BRPM | HEIGHT: 69 IN | TEMPERATURE: 99 F | WEIGHT: 117.31 LBS | SYSTOLIC BLOOD PRESSURE: 136 MMHG | HEART RATE: 94 BPM | OXYGEN SATURATION: 96 % | DIASTOLIC BLOOD PRESSURE: 64 MMHG | BODY MASS INDEX: 17.37 KG/M2

## 2021-09-29 DIAGNOSIS — R45.89 ANXIETY ABOUT HEALTH: ICD-10-CM

## 2021-09-29 DIAGNOSIS — D64.81 ANTINEOPLASTIC CHEMOTHERAPY INDUCED ANEMIA: ICD-10-CM

## 2021-09-29 DIAGNOSIS — G89.3 NEOPLASM RELATED PAIN: ICD-10-CM

## 2021-09-29 DIAGNOSIS — C45.0 MALIGNANT MESOTHELIOMA OF PLEURA: Primary | ICD-10-CM

## 2021-09-29 DIAGNOSIS — F32.0 CURRENT MILD EPISODE OF MAJOR DEPRESSIVE DISORDER WITHOUT PRIOR EPISODE: ICD-10-CM

## 2021-09-29 DIAGNOSIS — T45.1X5A ANTINEOPLASTIC CHEMOTHERAPY INDUCED ANEMIA: ICD-10-CM

## 2021-09-29 DIAGNOSIS — R53.83 FATIGUE, UNSPECIFIED TYPE: ICD-10-CM

## 2021-09-29 DIAGNOSIS — K20.90 ESOPHAGITIS: ICD-10-CM

## 2021-09-29 PROCEDURE — 1159F PR MEDICATION LIST DOCUMENTED IN MEDICAL RECORD: ICD-10-PCS | Mod: CPTII,S$GLB,, | Performed by: INTERNAL MEDICINE

## 2021-09-29 PROCEDURE — 3077F PR MOST RECENT SYSTOLIC BLOOD PRESSURE >= 140 MM HG: ICD-10-PCS | Mod: CPTII,S$GLB,, | Performed by: INTERNAL MEDICINE

## 2021-09-29 PROCEDURE — 3079F PR MOST RECENT DIASTOLIC BLOOD PRESSURE 80-89 MM HG: ICD-10-PCS | Mod: CPTII,S$GLB,, | Performed by: INTERNAL MEDICINE

## 2021-09-29 PROCEDURE — 1160F RVW MEDS BY RX/DR IN RCRD: CPT | Mod: CPTII,S$GLB,, | Performed by: INTERNAL MEDICINE

## 2021-09-29 PROCEDURE — 63600175 PHARM REV CODE 636 W HCPCS: Mod: JG,PN | Performed by: INTERNAL MEDICINE

## 2021-09-29 PROCEDURE — 4010F PR ACE/ARB THEARPY RXD/TAKEN: ICD-10-PCS | Mod: CPTII,S$GLB,, | Performed by: INTERNAL MEDICINE

## 2021-09-29 PROCEDURE — 1159F MED LIST DOCD IN RCRD: CPT | Mod: CPTII,S$GLB,, | Performed by: INTERNAL MEDICINE

## 2021-09-29 PROCEDURE — 3288F PR FALLS RISK ASSESSMENT DOCUMENTED: ICD-10-PCS | Mod: CPTII,S$GLB,, | Performed by: INTERNAL MEDICINE

## 2021-09-29 PROCEDURE — 96367 TX/PROPH/DG ADDL SEQ IV INF: CPT | Mod: PN

## 2021-09-29 PROCEDURE — 1101F PT FALLS ASSESS-DOCD LE1/YR: CPT | Mod: CPTII,S$GLB,, | Performed by: INTERNAL MEDICINE

## 2021-09-29 PROCEDURE — 1126F AMNT PAIN NOTED NONE PRSNT: CPT | Mod: CPTII,S$GLB,, | Performed by: INTERNAL MEDICINE

## 2021-09-29 PROCEDURE — 96413 CHEMO IV INFUSION 1 HR: CPT | Mod: PN

## 2021-09-29 PROCEDURE — A4216 STERILE WATER/SALINE, 10 ML: HCPCS | Mod: PN | Performed by: INTERNAL MEDICINE

## 2021-09-29 PROCEDURE — 25000003 PHARM REV CODE 250: Mod: PN | Performed by: INTERNAL MEDICINE

## 2021-09-29 PROCEDURE — 1160F PR REVIEW ALL MEDS BY PRESCRIBER/CLIN PHARMACIST DOCUMENTED: ICD-10-PCS | Mod: CPTII,S$GLB,, | Performed by: INTERNAL MEDICINE

## 2021-09-29 PROCEDURE — 1126F PR PAIN SEVERITY QUANTIFIED, NO PAIN PRESENT: ICD-10-PCS | Mod: CPTII,S$GLB,, | Performed by: INTERNAL MEDICINE

## 2021-09-29 PROCEDURE — 99214 OFFICE O/P EST MOD 30 MIN: CPT | Mod: S$GLB,,, | Performed by: INTERNAL MEDICINE

## 2021-09-29 PROCEDURE — 4010F ACE/ARB THERAPY RXD/TAKEN: CPT | Mod: CPTII,S$GLB,, | Performed by: INTERNAL MEDICINE

## 2021-09-29 PROCEDURE — 99999 PR PBB SHADOW E&M-EST. PATIENT-LVL V: CPT | Mod: PBBFAC,,, | Performed by: INTERNAL MEDICINE

## 2021-09-29 PROCEDURE — 3079F DIAST BP 80-89 MM HG: CPT | Mod: CPTII,S$GLB,, | Performed by: INTERNAL MEDICINE

## 2021-09-29 PROCEDURE — 1101F PR PT FALLS ASSESS DOC 0-1 FALLS W/OUT INJ PAST YR: ICD-10-PCS | Mod: CPTII,S$GLB,, | Performed by: INTERNAL MEDICINE

## 2021-09-29 PROCEDURE — 99214 PR OFFICE/OUTPT VISIT, EST, LEVL IV, 30-39 MIN: ICD-10-PCS | Mod: S$GLB,,, | Performed by: INTERNAL MEDICINE

## 2021-09-29 PROCEDURE — 3288F FALL RISK ASSESSMENT DOCD: CPT | Mod: CPTII,S$GLB,, | Performed by: INTERNAL MEDICINE

## 2021-09-29 PROCEDURE — 3077F SYST BP >= 140 MM HG: CPT | Mod: CPTII,S$GLB,, | Performed by: INTERNAL MEDICINE

## 2021-09-29 PROCEDURE — 99999 PR PBB SHADOW E&M-EST. PATIENT-LVL V: ICD-10-PCS | Mod: PBBFAC,,, | Performed by: INTERNAL MEDICINE

## 2021-09-29 PROCEDURE — 96372 THER/PROPH/DIAG INJ SC/IM: CPT | Mod: PN

## 2021-09-29 RX ORDER — HEPARIN 100 UNIT/ML
500 SYRINGE INTRAVENOUS
Status: CANCELLED | OUTPATIENT
Start: 2021-09-29

## 2021-09-29 RX ORDER — SODIUM CHLORIDE 0.9 % (FLUSH) 0.9 %
10 SYRINGE (ML) INJECTION
Status: DISCONTINUED | OUTPATIENT
Start: 2021-09-29 | End: 2021-09-29 | Stop reason: HOSPADM

## 2021-09-29 RX ORDER — ACETAMINOPHEN 500 MG
1000 TABLET ORAL
Status: CANCELLED
Start: 2021-09-29

## 2021-09-29 RX ORDER — SODIUM CHLORIDE 0.9 % (FLUSH) 0.9 %
10 SYRINGE (ML) INJECTION
Status: CANCELLED | OUTPATIENT
Start: 2021-09-29

## 2021-09-29 RX ORDER — ACETAMINOPHEN 500 MG
1000 TABLET ORAL
Status: COMPLETED | OUTPATIENT
Start: 2021-09-29 | End: 2021-09-29

## 2021-09-29 RX ORDER — HEPARIN 100 UNIT/ML
500 SYRINGE INTRAVENOUS
Status: DISCONTINUED | OUTPATIENT
Start: 2021-09-29 | End: 2021-09-29 | Stop reason: HOSPADM

## 2021-09-29 RX ORDER — POTASSIUM CHLORIDE 750 MG/1
TABLET, EXTENDED RELEASE ORAL
COMMUNITY
Start: 2021-09-21 | End: 2022-03-29

## 2021-09-29 RX ADMIN — SODIUM CHLORIDE: 0.9 INJECTION, SOLUTION INTRAVENOUS at 09:09

## 2021-09-29 RX ADMIN — SODIUM CHLORIDE 200 MG: 9 INJECTION, SOLUTION INTRAVENOUS at 10:09

## 2021-09-29 RX ADMIN — ACETAMINOPHEN 1000 MG: 500 TABLET, FILM COATED ORAL at 10:09

## 2021-09-29 RX ADMIN — Medication 10 ML: at 11:09

## 2021-09-29 RX ADMIN — DIPHENHYDRAMINE HYDROCHLORIDE 25 MG: 50 INJECTION INTRAMUSCULAR; INTRAVENOUS at 10:09

## 2021-09-29 RX ADMIN — HEPARIN 500 UNITS: 100 SYRINGE at 11:09

## 2021-09-29 RX ADMIN — EPOETIN ALFA-EPBX 60000 UNITS: 20000 INJECTION, SOLUTION INTRAVENOUS; SUBCUTANEOUS at 10:09

## 2021-10-05 DIAGNOSIS — C45.0 MALIGNANT MESOTHELIOMA OF PLEURA: Primary | ICD-10-CM

## 2021-10-05 RX ORDER — TRIAMCINOLONE ACETONIDE 1 MG/G
OINTMENT TOPICAL 2 TIMES DAILY
Status: DISCONTINUED | OUTPATIENT
Start: 2021-10-05 | End: 2021-10-05 | Stop reason: RX

## 2021-10-05 RX ORDER — TRIAMCINOLONE ACETONIDE 0.25 MG/G
OINTMENT TOPICAL 2 TIMES DAILY
COMMUNITY
End: 2021-10-05

## 2021-10-05 RX ORDER — TRIAMCINOLONE ACETONIDE 0.25 MG/G
OINTMENT TOPICAL 2 TIMES DAILY
Qty: 1 TUBE | Refills: 0 | Status: SHIPPED | OUTPATIENT
Start: 2021-10-05

## 2021-10-05 RX ORDER — TRIAMCINOLONE ACETONIDE 1 MG/G
CREAM TOPICAL 2 TIMES DAILY
COMMUNITY
End: 2021-10-05

## 2021-10-08 ENCOUNTER — PATIENT MESSAGE (OUTPATIENT)
Dept: HEMATOLOGY/ONCOLOGY | Facility: CLINIC | Age: 76
End: 2021-10-08

## 2021-10-19 PROCEDURE — G0179 MD RECERTIFICATION HHA PT: HCPCS | Mod: ,,, | Performed by: INTERNAL MEDICINE

## 2021-10-19 PROCEDURE — G0179 PR HOME HEALTH MD RECERTIFICATION: ICD-10-PCS | Mod: ,,, | Performed by: INTERNAL MEDICINE

## 2021-10-20 ENCOUNTER — INFUSION (OUTPATIENT)
Dept: INFUSION THERAPY | Facility: HOSPITAL | Age: 76
End: 2021-10-20
Attending: INTERNAL MEDICINE
Payer: MEDICARE

## 2021-10-20 ENCOUNTER — PATIENT MESSAGE (OUTPATIENT)
Dept: HEMATOLOGY/ONCOLOGY | Facility: CLINIC | Age: 76
End: 2021-10-20

## 2021-10-20 ENCOUNTER — OFFICE VISIT (OUTPATIENT)
Dept: HEMATOLOGY/ONCOLOGY | Facility: CLINIC | Age: 76
End: 2021-10-20
Payer: MEDICARE

## 2021-10-20 VITALS
TEMPERATURE: 98 F | WEIGHT: 115.31 LBS | HEIGHT: 69 IN | DIASTOLIC BLOOD PRESSURE: 74 MMHG | HEART RATE: 92 BPM | BODY MASS INDEX: 17.08 KG/M2 | RESPIRATION RATE: 17 BRPM | SYSTOLIC BLOOD PRESSURE: 119 MMHG

## 2021-10-20 VITALS
RESPIRATION RATE: 17 BRPM | DIASTOLIC BLOOD PRESSURE: 80 MMHG | OXYGEN SATURATION: 94 % | TEMPERATURE: 98 F | WEIGHT: 115.31 LBS | HEIGHT: 69 IN | BODY MASS INDEX: 17.08 KG/M2 | SYSTOLIC BLOOD PRESSURE: 142 MMHG | HEART RATE: 104 BPM

## 2021-10-20 DIAGNOSIS — C45.0 MALIGNANT MESOTHELIOMA OF PLEURA: Primary | ICD-10-CM

## 2021-10-20 DIAGNOSIS — G89.3 NEOPLASM RELATED PAIN: ICD-10-CM

## 2021-10-20 DIAGNOSIS — F32.0 CURRENT MILD EPISODE OF MAJOR DEPRESSIVE DISORDER WITHOUT PRIOR EPISODE: ICD-10-CM

## 2021-10-20 DIAGNOSIS — R53.83 FATIGUE, UNSPECIFIED TYPE: ICD-10-CM

## 2021-10-20 DIAGNOSIS — R45.89 ANXIETY ABOUT HEALTH: ICD-10-CM

## 2021-10-20 PROCEDURE — 99999 PR PBB SHADOW E&M-EST. PATIENT-LVL V: ICD-10-PCS | Mod: PBBFAC,,, | Performed by: INTERNAL MEDICINE

## 2021-10-20 PROCEDURE — 3079F PR MOST RECENT DIASTOLIC BLOOD PRESSURE 80-89 MM HG: ICD-10-PCS | Mod: CPTII,S$GLB,, | Performed by: INTERNAL MEDICINE

## 2021-10-20 PROCEDURE — 3288F FALL RISK ASSESSMENT DOCD: CPT | Mod: CPTII,S$GLB,, | Performed by: INTERNAL MEDICINE

## 2021-10-20 PROCEDURE — 1160F PR REVIEW ALL MEDS BY PRESCRIBER/CLIN PHARMACIST DOCUMENTED: ICD-10-PCS | Mod: CPTII,S$GLB,, | Performed by: INTERNAL MEDICINE

## 2021-10-20 PROCEDURE — 63600175 PHARM REV CODE 636 W HCPCS: Mod: PN | Performed by: INTERNAL MEDICINE

## 2021-10-20 PROCEDURE — 25000003 PHARM REV CODE 250: Mod: PN | Performed by: INTERNAL MEDICINE

## 2021-10-20 PROCEDURE — 3288F PR FALLS RISK ASSESSMENT DOCUMENTED: ICD-10-PCS | Mod: CPTII,S$GLB,, | Performed by: INTERNAL MEDICINE

## 2021-10-20 PROCEDURE — 99214 PR OFFICE/OUTPT VISIT, EST, LEVL IV, 30-39 MIN: ICD-10-PCS | Mod: S$GLB,,, | Performed by: INTERNAL MEDICINE

## 2021-10-20 PROCEDURE — 3077F SYST BP >= 140 MM HG: CPT | Mod: CPTII,S$GLB,, | Performed by: INTERNAL MEDICINE

## 2021-10-20 PROCEDURE — 1101F PR PT FALLS ASSESS DOC 0-1 FALLS W/OUT INJ PAST YR: ICD-10-PCS | Mod: CPTII,S$GLB,, | Performed by: INTERNAL MEDICINE

## 2021-10-20 PROCEDURE — 96413 CHEMO IV INFUSION 1 HR: CPT | Mod: PN

## 2021-10-20 PROCEDURE — 4010F PR ACE/ARB THEARPY RXD/TAKEN: ICD-10-PCS | Mod: CPTII,S$GLB,, | Performed by: INTERNAL MEDICINE

## 2021-10-20 PROCEDURE — 1160F RVW MEDS BY RX/DR IN RCRD: CPT | Mod: CPTII,S$GLB,, | Performed by: INTERNAL MEDICINE

## 2021-10-20 PROCEDURE — 99999 PR PBB SHADOW E&M-EST. PATIENT-LVL V: CPT | Mod: PBBFAC,,, | Performed by: INTERNAL MEDICINE

## 2021-10-20 PROCEDURE — A4216 STERILE WATER/SALINE, 10 ML: HCPCS | Mod: PN | Performed by: INTERNAL MEDICINE

## 2021-10-20 PROCEDURE — 3077F PR MOST RECENT SYSTOLIC BLOOD PRESSURE >= 140 MM HG: ICD-10-PCS | Mod: CPTII,S$GLB,, | Performed by: INTERNAL MEDICINE

## 2021-10-20 PROCEDURE — 1126F PR PAIN SEVERITY QUANTIFIED, NO PAIN PRESENT: ICD-10-PCS | Mod: CPTII,S$GLB,, | Performed by: INTERNAL MEDICINE

## 2021-10-20 PROCEDURE — 96367 TX/PROPH/DG ADDL SEQ IV INF: CPT | Mod: PN

## 2021-10-20 PROCEDURE — 3079F DIAST BP 80-89 MM HG: CPT | Mod: CPTII,S$GLB,, | Performed by: INTERNAL MEDICINE

## 2021-10-20 PROCEDURE — 1126F AMNT PAIN NOTED NONE PRSNT: CPT | Mod: CPTII,S$GLB,, | Performed by: INTERNAL MEDICINE

## 2021-10-20 PROCEDURE — 4010F ACE/ARB THERAPY RXD/TAKEN: CPT | Mod: CPTII,S$GLB,, | Performed by: INTERNAL MEDICINE

## 2021-10-20 PROCEDURE — 1159F MED LIST DOCD IN RCRD: CPT | Mod: CPTII,S$GLB,, | Performed by: INTERNAL MEDICINE

## 2021-10-20 PROCEDURE — 1101F PT FALLS ASSESS-DOCD LE1/YR: CPT | Mod: CPTII,S$GLB,, | Performed by: INTERNAL MEDICINE

## 2021-10-20 PROCEDURE — 1159F PR MEDICATION LIST DOCUMENTED IN MEDICAL RECORD: ICD-10-PCS | Mod: CPTII,S$GLB,, | Performed by: INTERNAL MEDICINE

## 2021-10-20 PROCEDURE — 99214 OFFICE O/P EST MOD 30 MIN: CPT | Mod: S$GLB,,, | Performed by: INTERNAL MEDICINE

## 2021-10-20 RX ORDER — HEPARIN 100 UNIT/ML
500 SYRINGE INTRAVENOUS
Status: CANCELLED | OUTPATIENT
Start: 2021-10-20

## 2021-10-20 RX ORDER — TRIAMCINOLONE ACETONIDE 0.25 MG/G
CREAM TOPICAL 2 TIMES DAILY
COMMUNITY
Start: 2021-10-05

## 2021-10-20 RX ORDER — ACETAMINOPHEN 500 MG
1000 TABLET ORAL
Status: COMPLETED | OUTPATIENT
Start: 2021-10-20 | End: 2021-10-20

## 2021-10-20 RX ORDER — SODIUM CHLORIDE 0.9 % (FLUSH) 0.9 %
10 SYRINGE (ML) INJECTION
Status: CANCELLED | OUTPATIENT
Start: 2021-10-20

## 2021-10-20 RX ORDER — ACETAMINOPHEN 500 MG
1000 TABLET ORAL
Status: CANCELLED
Start: 2021-10-20

## 2021-10-20 RX ORDER — SODIUM CHLORIDE 0.9 % (FLUSH) 0.9 %
10 SYRINGE (ML) INJECTION
Status: DISCONTINUED | OUTPATIENT
Start: 2021-10-20 | End: 2021-10-20 | Stop reason: HOSPADM

## 2021-10-20 RX ORDER — HEPARIN 100 UNIT/ML
500 SYRINGE INTRAVENOUS
Status: DISCONTINUED | OUTPATIENT
Start: 2021-10-20 | End: 2021-10-20 | Stop reason: HOSPADM

## 2021-10-20 RX ADMIN — Medication 10 ML: at 01:10

## 2021-10-20 RX ADMIN — SODIUM CHLORIDE: 0.9 INJECTION, SOLUTION INTRAVENOUS at 12:10

## 2021-10-20 RX ADMIN — SODIUM CHLORIDE 200 MG: 9 INJECTION, SOLUTION INTRAVENOUS at 12:10

## 2021-10-20 RX ADMIN — HEPARIN 500 UNITS: 100 SYRINGE at 01:10

## 2021-10-20 RX ADMIN — ACETAMINOPHEN 1000 MG: 500 TABLET ORAL at 12:10

## 2021-10-20 RX ADMIN — DIPHENHYDRAMINE HYDROCHLORIDE 25 MG: 50 INJECTION INTRAMUSCULAR; INTRAVENOUS at 12:10

## 2021-10-25 ENCOUNTER — EXTERNAL HOME HEALTH (OUTPATIENT)
Dept: HOME HEALTH SERVICES | Facility: HOSPITAL | Age: 76
End: 2021-10-25
Payer: MEDICARE

## 2021-11-10 ENCOUNTER — OFFICE VISIT (OUTPATIENT)
Dept: HEMATOLOGY/ONCOLOGY | Facility: CLINIC | Age: 76
End: 2021-11-10
Payer: MEDICARE

## 2021-11-10 ENCOUNTER — INFUSION (OUTPATIENT)
Dept: INFUSION THERAPY | Facility: HOSPITAL | Age: 76
End: 2021-11-10
Attending: INTERNAL MEDICINE
Payer: MEDICARE

## 2021-11-10 ENCOUNTER — PATIENT MESSAGE (OUTPATIENT)
Dept: HEMATOLOGY/ONCOLOGY | Facility: CLINIC | Age: 76
End: 2021-11-10

## 2021-11-10 VITALS
BODY MASS INDEX: 17.34 KG/M2 | TEMPERATURE: 97 F | OXYGEN SATURATION: 98 % | WEIGHT: 117.06 LBS | DIASTOLIC BLOOD PRESSURE: 88 MMHG | SYSTOLIC BLOOD PRESSURE: 167 MMHG | HEART RATE: 99 BPM | RESPIRATION RATE: 18 BRPM | HEIGHT: 69 IN

## 2021-11-10 VITALS
TEMPERATURE: 97 F | BODY MASS INDEX: 17.34 KG/M2 | DIASTOLIC BLOOD PRESSURE: 67 MMHG | HEIGHT: 69 IN | WEIGHT: 117.06 LBS | RESPIRATION RATE: 18 BRPM | HEART RATE: 93 BPM | SYSTOLIC BLOOD PRESSURE: 128 MMHG

## 2021-11-10 DIAGNOSIS — G89.3 NEOPLASM RELATED PAIN: ICD-10-CM

## 2021-11-10 DIAGNOSIS — F32.0 CURRENT MILD EPISODE OF MAJOR DEPRESSIVE DISORDER WITHOUT PRIOR EPISODE: ICD-10-CM

## 2021-11-10 DIAGNOSIS — R45.89 ANXIETY ABOUT HEALTH: ICD-10-CM

## 2021-11-10 DIAGNOSIS — C45.0 MALIGNANT MESOTHELIOMA OF PLEURA: Primary | ICD-10-CM

## 2021-11-10 DIAGNOSIS — K20.90 ESOPHAGITIS: ICD-10-CM

## 2021-11-10 PROCEDURE — 63600175 PHARM REV CODE 636 W HCPCS: Mod: PN | Performed by: INTERNAL MEDICINE

## 2021-11-10 PROCEDURE — 99999 PR PBB SHADOW E&M-EST. PATIENT-LVL V: ICD-10-PCS | Mod: PBBFAC,,, | Performed by: INTERNAL MEDICINE

## 2021-11-10 PROCEDURE — 1101F PR PT FALLS ASSESS DOC 0-1 FALLS W/OUT INJ PAST YR: ICD-10-PCS | Mod: CPTII,S$GLB,, | Performed by: INTERNAL MEDICINE

## 2021-11-10 PROCEDURE — 1160F RVW MEDS BY RX/DR IN RCRD: CPT | Mod: CPTII,S$GLB,, | Performed by: INTERNAL MEDICINE

## 2021-11-10 PROCEDURE — 25000003 PHARM REV CODE 250: Mod: PN | Performed by: INTERNAL MEDICINE

## 2021-11-10 PROCEDURE — 3288F PR FALLS RISK ASSESSMENT DOCUMENTED: ICD-10-PCS | Mod: CPTII,S$GLB,, | Performed by: INTERNAL MEDICINE

## 2021-11-10 PROCEDURE — 1101F PT FALLS ASSESS-DOCD LE1/YR: CPT | Mod: CPTII,S$GLB,, | Performed by: INTERNAL MEDICINE

## 2021-11-10 PROCEDURE — 1126F PR PAIN SEVERITY QUANTIFIED, NO PAIN PRESENT: ICD-10-PCS | Mod: CPTII,S$GLB,, | Performed by: INTERNAL MEDICINE

## 2021-11-10 PROCEDURE — 96413 CHEMO IV INFUSION 1 HR: CPT | Mod: PN

## 2021-11-10 PROCEDURE — 4010F PR ACE/ARB THEARPY RXD/TAKEN: ICD-10-PCS | Mod: CPTII,S$GLB,, | Performed by: INTERNAL MEDICINE

## 2021-11-10 PROCEDURE — 3079F PR MOST RECENT DIASTOLIC BLOOD PRESSURE 80-89 MM HG: ICD-10-PCS | Mod: CPTII,S$GLB,, | Performed by: INTERNAL MEDICINE

## 2021-11-10 PROCEDURE — 99999 PR PBB SHADOW E&M-EST. PATIENT-LVL V: CPT | Mod: PBBFAC,,, | Performed by: INTERNAL MEDICINE

## 2021-11-10 PROCEDURE — A4216 STERILE WATER/SALINE, 10 ML: HCPCS | Mod: PN | Performed by: INTERNAL MEDICINE

## 2021-11-10 PROCEDURE — 3079F DIAST BP 80-89 MM HG: CPT | Mod: CPTII,S$GLB,, | Performed by: INTERNAL MEDICINE

## 2021-11-10 PROCEDURE — 3288F FALL RISK ASSESSMENT DOCD: CPT | Mod: CPTII,S$GLB,, | Performed by: INTERNAL MEDICINE

## 2021-11-10 PROCEDURE — 1126F AMNT PAIN NOTED NONE PRSNT: CPT | Mod: CPTII,S$GLB,, | Performed by: INTERNAL MEDICINE

## 2021-11-10 PROCEDURE — 99214 PR OFFICE/OUTPT VISIT, EST, LEVL IV, 30-39 MIN: ICD-10-PCS | Mod: S$GLB,,, | Performed by: INTERNAL MEDICINE

## 2021-11-10 PROCEDURE — 3077F SYST BP >= 140 MM HG: CPT | Mod: CPTII,S$GLB,, | Performed by: INTERNAL MEDICINE

## 2021-11-10 PROCEDURE — 3077F PR MOST RECENT SYSTOLIC BLOOD PRESSURE >= 140 MM HG: ICD-10-PCS | Mod: CPTII,S$GLB,, | Performed by: INTERNAL MEDICINE

## 2021-11-10 PROCEDURE — 1160F PR REVIEW ALL MEDS BY PRESCRIBER/CLIN PHARMACIST DOCUMENTED: ICD-10-PCS | Mod: CPTII,S$GLB,, | Performed by: INTERNAL MEDICINE

## 2021-11-10 PROCEDURE — 96367 TX/PROPH/DG ADDL SEQ IV INF: CPT | Mod: PN

## 2021-11-10 PROCEDURE — 1159F PR MEDICATION LIST DOCUMENTED IN MEDICAL RECORD: ICD-10-PCS | Mod: CPTII,S$GLB,, | Performed by: INTERNAL MEDICINE

## 2021-11-10 PROCEDURE — 99214 OFFICE O/P EST MOD 30 MIN: CPT | Mod: S$GLB,,, | Performed by: INTERNAL MEDICINE

## 2021-11-10 PROCEDURE — 4010F ACE/ARB THERAPY RXD/TAKEN: CPT | Mod: CPTII,S$GLB,, | Performed by: INTERNAL MEDICINE

## 2021-11-10 PROCEDURE — 1159F MED LIST DOCD IN RCRD: CPT | Mod: CPTII,S$GLB,, | Performed by: INTERNAL MEDICINE

## 2021-11-10 RX ORDER — HEPARIN 100 UNIT/ML
500 SYRINGE INTRAVENOUS
Status: CANCELLED | OUTPATIENT
Start: 2021-11-10

## 2021-11-10 RX ORDER — SODIUM CHLORIDE 0.9 % (FLUSH) 0.9 %
10 SYRINGE (ML) INJECTION
Status: DISCONTINUED | OUTPATIENT
Start: 2021-11-10 | End: 2021-11-10 | Stop reason: HOSPADM

## 2021-11-10 RX ORDER — ACETAMINOPHEN 500 MG
1000 TABLET ORAL
Status: CANCELLED
Start: 2021-11-10

## 2021-11-10 RX ORDER — HEPARIN 100 UNIT/ML
500 SYRINGE INTRAVENOUS
Status: DISCONTINUED | OUTPATIENT
Start: 2021-11-10 | End: 2021-11-10 | Stop reason: HOSPADM

## 2021-11-10 RX ORDER — ACETAMINOPHEN 500 MG
1000 TABLET ORAL
Status: COMPLETED | OUTPATIENT
Start: 2021-11-10 | End: 2021-11-10

## 2021-11-10 RX ORDER — SODIUM CHLORIDE 0.9 % (FLUSH) 0.9 %
10 SYRINGE (ML) INJECTION
Status: CANCELLED | OUTPATIENT
Start: 2021-11-10

## 2021-11-10 RX ADMIN — DIPHENHYDRAMINE HYDROCHLORIDE 25 MG: 50 INJECTION INTRAMUSCULAR; INTRAVENOUS at 12:11

## 2021-11-10 RX ADMIN — SODIUM CHLORIDE 200 MG: 9 INJECTION, SOLUTION INTRAVENOUS at 12:11

## 2021-11-10 RX ADMIN — HEPARIN 500 UNITS: 100 SYRINGE at 12:11

## 2021-11-10 RX ADMIN — Medication 10 ML: at 12:11

## 2021-11-10 RX ADMIN — SODIUM CHLORIDE: 9 INJECTION, SOLUTION INTRAVENOUS at 11:11

## 2021-11-10 RX ADMIN — ACETAMINOPHEN 1000 MG: 500 TABLET ORAL at 12:11

## 2021-11-29 ENCOUNTER — HOSPITAL ENCOUNTER (OUTPATIENT)
Dept: RADIOLOGY | Facility: HOSPITAL | Age: 76
Discharge: HOME | End: 2021-11-29
Attending: INTERNAL MEDICINE
Payer: MEDICARE

## 2021-11-29 DIAGNOSIS — C45.0 MALIGNANT MESOTHELIOMA OF PLEURA: ICD-10-CM

## 2021-11-29 LAB — GLUCOSE SERPL-MCNC: 115 MG/DL (ref 70–110)

## 2021-11-29 PROCEDURE — 78815 NM PET CT ROUTINE: ICD-10-PCS | Mod: 26,PS,, | Performed by: RADIOLOGY

## 2021-11-29 PROCEDURE — 78815 PET IMAGE W/CT SKULL-THIGH: CPT | Mod: 26,PS,, | Performed by: RADIOLOGY

## 2021-11-29 PROCEDURE — 78815 PET IMAGE W/CT SKULL-THIGH: CPT | Mod: TC,PN,PS

## 2021-11-30 ENCOUNTER — TELEPHONE (OUTPATIENT)
Dept: HEMATOLOGY/ONCOLOGY | Facility: CLINIC | Age: 76
End: 2021-11-30
Payer: MEDICARE

## 2021-11-30 DIAGNOSIS — C45.0 MALIGNANT MESOTHELIOMA OF PLEURA: Primary | ICD-10-CM

## 2021-12-01 ENCOUNTER — OFFICE VISIT (OUTPATIENT)
Dept: HEMATOLOGY/ONCOLOGY | Facility: CLINIC | Age: 76
End: 2021-12-01
Payer: MEDICARE

## 2021-12-01 ENCOUNTER — DOCUMENTATION ONLY (OUTPATIENT)
Dept: HEMATOLOGY/ONCOLOGY | Facility: CLINIC | Age: 76
End: 2021-12-01
Payer: MEDICARE

## 2021-12-01 VITALS
BODY MASS INDEX: 17.27 KG/M2 | DIASTOLIC BLOOD PRESSURE: 83 MMHG | HEIGHT: 69 IN | HEART RATE: 95 BPM | TEMPERATURE: 97 F | SYSTOLIC BLOOD PRESSURE: 132 MMHG | WEIGHT: 116.63 LBS | RESPIRATION RATE: 18 BRPM | OXYGEN SATURATION: 93 %

## 2021-12-01 DIAGNOSIS — D64.81 ANTINEOPLASTIC CHEMOTHERAPY INDUCED ANEMIA: ICD-10-CM

## 2021-12-01 DIAGNOSIS — R45.89 ANXIETY ABOUT HEALTH: ICD-10-CM

## 2021-12-01 DIAGNOSIS — C45.0 MALIGNANT MESOTHELIOMA OF PLEURA: Primary | ICD-10-CM

## 2021-12-01 DIAGNOSIS — T45.1X5A ANTINEOPLASTIC CHEMOTHERAPY INDUCED ANEMIA: ICD-10-CM

## 2021-12-01 DIAGNOSIS — G89.3 NEOPLASM RELATED PAIN: ICD-10-CM

## 2021-12-01 DIAGNOSIS — F32.0 CURRENT MILD EPISODE OF MAJOR DEPRESSIVE DISORDER WITHOUT PRIOR EPISODE: ICD-10-CM

## 2021-12-01 PROCEDURE — 99215 OFFICE O/P EST HI 40 MIN: CPT | Mod: S$GLB,,, | Performed by: INTERNAL MEDICINE

## 2021-12-01 PROCEDURE — 4010F PR ACE/ARB THEARPY RXD/TAKEN: ICD-10-PCS | Mod: CPTII,S$GLB,, | Performed by: INTERNAL MEDICINE

## 2021-12-01 PROCEDURE — 99999 PR PBB SHADOW E&M-EST. PATIENT-LVL III: ICD-10-PCS | Mod: PBBFAC,,, | Performed by: INTERNAL MEDICINE

## 2021-12-01 PROCEDURE — 99999 PR PBB SHADOW E&M-EST. PATIENT-LVL III: CPT | Mod: PBBFAC,,, | Performed by: INTERNAL MEDICINE

## 2021-12-01 PROCEDURE — 99215 PR OFFICE/OUTPT VISIT, EST, LEVL V, 40-54 MIN: ICD-10-PCS | Mod: S$GLB,,, | Performed by: INTERNAL MEDICINE

## 2021-12-01 PROCEDURE — 4010F ACE/ARB THERAPY RXD/TAKEN: CPT | Mod: CPTII,S$GLB,, | Performed by: INTERNAL MEDICINE

## 2021-12-01 RX ORDER — DEXAMETHASONE SODIUM PHOSPHATE 4 MG/ML
4 INJECTION, SOLUTION INTRA-ARTICULAR; INTRALESIONAL; INTRAMUSCULAR; INTRAVENOUS; SOFT TISSUE
Status: CANCELLED
Start: 2021-12-09

## 2021-12-01 RX ORDER — HEPARIN 100 UNIT/ML
500 SYRINGE INTRAVENOUS
Status: CANCELLED | OUTPATIENT
Start: 2021-12-09

## 2021-12-01 RX ORDER — SODIUM CHLORIDE 0.9 % (FLUSH) 0.9 %
10 SYRINGE (ML) INJECTION
Status: CANCELLED | OUTPATIENT
Start: 2021-12-09

## 2021-12-02 ENCOUNTER — TELEPHONE (OUTPATIENT)
Dept: HEMATOLOGY/ONCOLOGY | Facility: CLINIC | Age: 76
End: 2021-12-02
Payer: MEDICARE

## 2021-12-02 DIAGNOSIS — C45.0 MALIGNANT MESOTHELIOMA OF PLEURA: Primary | ICD-10-CM

## 2021-12-03 ENCOUNTER — TELEPHONE (OUTPATIENT)
Dept: HEMATOLOGY/ONCOLOGY | Facility: CLINIC | Age: 76
End: 2021-12-03
Payer: MEDICARE

## 2021-12-06 ENCOUNTER — TELEPHONE (OUTPATIENT)
Dept: HEMATOLOGY/ONCOLOGY | Facility: CLINIC | Age: 76
End: 2021-12-06
Payer: MEDICARE

## 2021-12-08 ENCOUNTER — DOCUMENTATION ONLY (OUTPATIENT)
Dept: PHARMACY | Facility: AMBULARY SURGERY CENTER | Age: 76
End: 2021-12-08
Payer: MEDICARE

## 2021-12-08 ENCOUNTER — TELEPHONE (OUTPATIENT)
Dept: HEMATOLOGY/ONCOLOGY | Facility: CLINIC | Age: 76
End: 2021-12-08
Payer: MEDICARE

## 2021-12-09 ENCOUNTER — TELEPHONE (OUTPATIENT)
Dept: HEMATOLOGY/ONCOLOGY | Facility: CLINIC | Age: 76
End: 2021-12-09

## 2021-12-09 ENCOUNTER — DOCUMENTATION ONLY (OUTPATIENT)
Dept: INFUSION THERAPY | Facility: HOSPITAL | Age: 76
End: 2021-12-09

## 2021-12-09 ENCOUNTER — INFUSION (OUTPATIENT)
Dept: INFUSION THERAPY | Facility: HOSPITAL | Age: 76
End: 2021-12-09
Attending: INTERNAL MEDICINE
Payer: MEDICARE

## 2021-12-09 ENCOUNTER — DOCUMENTATION ONLY (OUTPATIENT)
Dept: HEMATOLOGY/ONCOLOGY | Facility: CLINIC | Age: 76
End: 2021-12-09

## 2021-12-09 ENCOUNTER — CLINICAL SUPPORT (OUTPATIENT)
Dept: HEMATOLOGY/ONCOLOGY | Facility: CLINIC | Age: 76
End: 2021-12-09
Payer: MEDICARE

## 2021-12-09 VITALS
WEIGHT: 119.94 LBS | RESPIRATION RATE: 16 BRPM | DIASTOLIC BLOOD PRESSURE: 69 MMHG | HEART RATE: 95 BPM | TEMPERATURE: 99 F | HEIGHT: 62 IN | SYSTOLIC BLOOD PRESSURE: 110 MMHG | BODY MASS INDEX: 22.07 KG/M2

## 2021-12-09 VITALS
SYSTOLIC BLOOD PRESSURE: 136 MMHG | OXYGEN SATURATION: 96 % | HEIGHT: 62 IN | TEMPERATURE: 97 F | WEIGHT: 119.06 LBS | BODY MASS INDEX: 21.91 KG/M2 | HEART RATE: 102 BPM | DIASTOLIC BLOOD PRESSURE: 80 MMHG

## 2021-12-09 DIAGNOSIS — C45.0 MALIGNANT MESOTHELIOMA OF PLEURA: Primary | ICD-10-CM

## 2021-12-09 DIAGNOSIS — R45.89 ANXIETY ABOUT HEALTH: ICD-10-CM

## 2021-12-09 PROCEDURE — 99214 PR OFFICE/OUTPT VISIT, EST, LEVL IV, 30-39 MIN: ICD-10-PCS | Mod: S$GLB,,,

## 2021-12-09 PROCEDURE — 96413 CHEMO IV INFUSION 1 HR: CPT | Mod: PN

## 2021-12-09 PROCEDURE — 96375 TX/PRO/DX INJ NEW DRUG ADDON: CPT | Mod: PN

## 2021-12-09 PROCEDURE — 99999 PR PBB SHADOW E&M-EST. PATIENT-LVL V: ICD-10-PCS | Mod: PBBFAC,,,

## 2021-12-09 PROCEDURE — 63600175 PHARM REV CODE 636 W HCPCS: Mod: PN | Performed by: INTERNAL MEDICINE

## 2021-12-09 PROCEDURE — 25000003 PHARM REV CODE 250: Mod: PN | Performed by: INTERNAL MEDICINE

## 2021-12-09 PROCEDURE — 99999 PR PBB SHADOW E&M-EST. PATIENT-LVL V: CPT | Mod: PBBFAC,,,

## 2021-12-09 PROCEDURE — A4216 STERILE WATER/SALINE, 10 ML: HCPCS | Mod: PN | Performed by: INTERNAL MEDICINE

## 2021-12-09 PROCEDURE — 99214 OFFICE O/P EST MOD 30 MIN: CPT | Mod: S$GLB,,,

## 2021-12-09 RX ORDER — SODIUM CHLORIDE 0.9 % (FLUSH) 0.9 %
10 SYRINGE (ML) INJECTION
Status: DISCONTINUED | OUTPATIENT
Start: 2021-12-09 | End: 2021-12-09 | Stop reason: HOSPADM

## 2021-12-09 RX ORDER — HEPARIN 100 UNIT/ML
500 SYRINGE INTRAVENOUS
Status: DISCONTINUED | OUTPATIENT
Start: 2021-12-09 | End: 2021-12-09 | Stop reason: HOSPADM

## 2021-12-09 RX ORDER — DEXAMETHASONE SODIUM PHOSPHATE 4 MG/ML
4 INJECTION, SOLUTION INTRA-ARTICULAR; INTRALESIONAL; INTRAMUSCULAR; INTRAVENOUS; SOFT TISSUE
Status: COMPLETED | OUTPATIENT
Start: 2021-12-09 | End: 2021-12-09

## 2021-12-09 RX ADMIN — GEMCITABINE 1600 MG: 38 INJECTION, SOLUTION INTRAVENOUS at 10:12

## 2021-12-09 RX ADMIN — Medication 10 ML: at 11:12

## 2021-12-09 RX ADMIN — DEXAMETHASONE SODIUM PHOSPHATE 4 MG: 4 INJECTION INTRA-ARTICULAR; INTRALESIONAL; INTRAMUSCULAR; INTRAVENOUS; SOFT TISSUE at 10:12

## 2021-12-09 RX ADMIN — SODIUM CHLORIDE: 0.9 INJECTION, SOLUTION INTRAVENOUS at 10:12

## 2021-12-09 RX ADMIN — HEPARIN 500 UNITS: 100 SYRINGE at 11:12

## 2021-12-10 ENCOUNTER — TELEPHONE (OUTPATIENT)
Dept: CARDIOTHORACIC SURGERY | Facility: HOSPITAL | Age: 76
End: 2021-12-10
Payer: MEDICARE

## 2021-12-13 ENCOUNTER — DOCUMENT SCAN (OUTPATIENT)
Dept: HOME HEALTH SERVICES | Facility: HOSPITAL | Age: 76
End: 2021-12-13
Payer: MEDICARE

## 2021-12-15 ENCOUNTER — TELEPHONE (OUTPATIENT)
Dept: VASCULAR SURGERY | Facility: CLINIC | Age: 76
End: 2021-12-15
Payer: MEDICARE

## 2021-12-15 ENCOUNTER — DOCUMENTATION ONLY (OUTPATIENT)
Dept: INFUSION THERAPY | Facility: HOSPITAL | Age: 76
End: 2021-12-15
Payer: MEDICARE

## 2021-12-16 ENCOUNTER — PATIENT MESSAGE (OUTPATIENT)
Dept: HEMATOLOGY/ONCOLOGY | Facility: CLINIC | Age: 76
End: 2021-12-16

## 2021-12-16 ENCOUNTER — INFUSION (OUTPATIENT)
Dept: INFUSION THERAPY | Facility: HOSPITAL | Age: 76
End: 2021-12-16
Attending: INTERNAL MEDICINE
Payer: MEDICARE

## 2021-12-16 ENCOUNTER — OFFICE VISIT (OUTPATIENT)
Dept: HEMATOLOGY/ONCOLOGY | Facility: CLINIC | Age: 76
End: 2021-12-16
Payer: MEDICARE

## 2021-12-16 VITALS
OXYGEN SATURATION: 98 % | HEART RATE: 102 BPM | BODY MASS INDEX: 21.86 KG/M2 | WEIGHT: 118.81 LBS | DIASTOLIC BLOOD PRESSURE: 86 MMHG | TEMPERATURE: 96 F | SYSTOLIC BLOOD PRESSURE: 153 MMHG | RESPIRATION RATE: 18 BRPM | HEIGHT: 62 IN

## 2021-12-16 VITALS
HEIGHT: 62 IN | TEMPERATURE: 98 F | WEIGHT: 118.81 LBS | BODY MASS INDEX: 21.86 KG/M2 | HEART RATE: 88 BPM | SYSTOLIC BLOOD PRESSURE: 149 MMHG | RESPIRATION RATE: 17 BRPM | OXYGEN SATURATION: 99 % | DIASTOLIC BLOOD PRESSURE: 79 MMHG

## 2021-12-16 DIAGNOSIS — C45.0 MALIGNANT MESOTHELIOMA OF PLEURA: Primary | ICD-10-CM

## 2021-12-16 DIAGNOSIS — F32.0 CURRENT MILD EPISODE OF MAJOR DEPRESSIVE DISORDER WITHOUT PRIOR EPISODE: ICD-10-CM

## 2021-12-16 DIAGNOSIS — G89.3 NEOPLASM RELATED PAIN: ICD-10-CM

## 2021-12-16 DIAGNOSIS — T45.1X5A ANTINEOPLASTIC CHEMOTHERAPY INDUCED ANEMIA: ICD-10-CM

## 2021-12-16 DIAGNOSIS — D64.81 ANTINEOPLASTIC CHEMOTHERAPY INDUCED ANEMIA: ICD-10-CM

## 2021-12-16 DIAGNOSIS — R45.89 ANXIETY ABOUT HEALTH: ICD-10-CM

## 2021-12-16 DIAGNOSIS — K20.90 ESOPHAGITIS: ICD-10-CM

## 2021-12-16 PROCEDURE — 99999 PR PBB SHADOW E&M-EST. PATIENT-LVL III: ICD-10-PCS | Mod: PBBFAC,,, | Performed by: INTERNAL MEDICINE

## 2021-12-16 PROCEDURE — 4010F ACE/ARB THERAPY RXD/TAKEN: CPT | Mod: CPTII,S$GLB,, | Performed by: INTERNAL MEDICINE

## 2021-12-16 PROCEDURE — 99214 PR OFFICE/OUTPT VISIT, EST, LEVL IV, 30-39 MIN: ICD-10-PCS | Mod: S$GLB,,, | Performed by: INTERNAL MEDICINE

## 2021-12-16 PROCEDURE — 99214 OFFICE O/P EST MOD 30 MIN: CPT | Mod: S$GLB,,, | Performed by: INTERNAL MEDICINE

## 2021-12-16 PROCEDURE — 96413 CHEMO IV INFUSION 1 HR: CPT | Mod: PN

## 2021-12-16 PROCEDURE — 63600175 PHARM REV CODE 636 W HCPCS: Mod: PN | Performed by: INTERNAL MEDICINE

## 2021-12-16 PROCEDURE — 4010F PR ACE/ARB THEARPY RXD/TAKEN: ICD-10-PCS | Mod: CPTII,S$GLB,, | Performed by: INTERNAL MEDICINE

## 2021-12-16 PROCEDURE — 96375 TX/PRO/DX INJ NEW DRUG ADDON: CPT | Mod: PN

## 2021-12-16 PROCEDURE — 99999 PR PBB SHADOW E&M-EST. PATIENT-LVL III: CPT | Mod: PBBFAC,,, | Performed by: INTERNAL MEDICINE

## 2021-12-16 PROCEDURE — 25000003 PHARM REV CODE 250: Mod: PN | Performed by: INTERNAL MEDICINE

## 2021-12-16 RX ORDER — SODIUM CHLORIDE 0.9 % (FLUSH) 0.9 %
10 SYRINGE (ML) INJECTION
Status: DISCONTINUED | OUTPATIENT
Start: 2021-12-16 | End: 2021-12-16 | Stop reason: HOSPADM

## 2021-12-16 RX ORDER — HEPARIN 100 UNIT/ML
500 SYRINGE INTRAVENOUS
Status: DISCONTINUED | OUTPATIENT
Start: 2021-12-16 | End: 2021-12-16 | Stop reason: HOSPADM

## 2021-12-16 RX ORDER — DEXAMETHASONE SODIUM PHOSPHATE 4 MG/ML
4 INJECTION, SOLUTION INTRA-ARTICULAR; INTRALESIONAL; INTRAMUSCULAR; INTRAVENOUS; SOFT TISSUE
Status: COMPLETED | OUTPATIENT
Start: 2021-12-16 | End: 2021-12-16

## 2021-12-16 RX ORDER — SODIUM CHLORIDE 0.9 % (FLUSH) 0.9 %
10 SYRINGE (ML) INJECTION
Status: CANCELLED | OUTPATIENT
Start: 2021-12-16

## 2021-12-16 RX ORDER — HEPARIN 100 UNIT/ML
500 SYRINGE INTRAVENOUS
Status: CANCELLED | OUTPATIENT
Start: 2021-12-16

## 2021-12-16 RX ORDER — DEXAMETHASONE SODIUM PHOSPHATE 4 MG/ML
4 INJECTION, SOLUTION INTRA-ARTICULAR; INTRALESIONAL; INTRAMUSCULAR; INTRAVENOUS; SOFT TISSUE
Status: CANCELLED
Start: 2021-12-16

## 2021-12-16 RX ADMIN — GEMCITABINE 1600 MG: 38 INJECTION, SOLUTION INTRAVENOUS at 11:12

## 2021-12-16 RX ADMIN — HEPARIN 500 UNITS: 100 SYRINGE at 11:12

## 2021-12-16 RX ADMIN — DEXAMETHASONE SODIUM PHOSPHATE 4 MG: 4 INJECTION INTRA-ARTICULAR; INTRALESIONAL; INTRAMUSCULAR; INTRAVENOUS; SOFT TISSUE at 10:12

## 2021-12-16 RX ADMIN — SODIUM CHLORIDE: 0.9 INJECTION, SOLUTION INTRAVENOUS at 10:12

## 2021-12-17 ENCOUNTER — INFUSION (OUTPATIENT)
Dept: INFUSION THERAPY | Facility: HOSPITAL | Age: 76
End: 2021-12-17
Attending: INTERNAL MEDICINE
Payer: MEDICARE

## 2021-12-17 VITALS
SYSTOLIC BLOOD PRESSURE: 151 MMHG | HEART RATE: 102 BPM | TEMPERATURE: 98 F | DIASTOLIC BLOOD PRESSURE: 90 MMHG | OXYGEN SATURATION: 99 % | RESPIRATION RATE: 17 BRPM

## 2021-12-17 DIAGNOSIS — C45.0 MALIGNANT MESOTHELIOMA OF PLEURA: Primary | ICD-10-CM

## 2021-12-17 PROCEDURE — 63600175 PHARM REV CODE 636 W HCPCS: Mod: TB,PN | Performed by: INTERNAL MEDICINE

## 2021-12-17 PROCEDURE — 96372 THER/PROPH/DIAG INJ SC/IM: CPT | Mod: PN

## 2021-12-17 RX ADMIN — PEGFILGRASTIM-CBQV 6 MG: 6 INJECTION, SOLUTION SUBCUTANEOUS at 09:12

## 2021-12-18 PROCEDURE — G0179 PR HOME HEALTH MD RECERTIFICATION: ICD-10-PCS | Mod: ,,, | Performed by: INTERNAL MEDICINE

## 2021-12-18 PROCEDURE — G0179 MD RECERTIFICATION HHA PT: HCPCS | Mod: ,,, | Performed by: INTERNAL MEDICINE

## 2021-12-21 ENCOUNTER — EXTERNAL HOME HEALTH (OUTPATIENT)
Dept: HOME HEALTH SERVICES | Facility: HOSPITAL | Age: 76
End: 2021-12-21
Payer: MEDICARE

## 2021-12-30 ENCOUNTER — LAB VISIT (OUTPATIENT)
Dept: LAB | Facility: HOSPITAL | Age: 76
End: 2021-12-30
Attending: INTERNAL MEDICINE
Payer: MEDICARE

## 2021-12-30 ENCOUNTER — OFFICE VISIT (OUTPATIENT)
Dept: HEMATOLOGY/ONCOLOGY | Facility: CLINIC | Age: 76
End: 2021-12-30
Payer: MEDICARE

## 2021-12-30 VITALS
HEIGHT: 62 IN | TEMPERATURE: 97 F | BODY MASS INDEX: 22.07 KG/M2 | RESPIRATION RATE: 18 BRPM | HEART RATE: 104 BPM | WEIGHT: 119.94 LBS | SYSTOLIC BLOOD PRESSURE: 139 MMHG | DIASTOLIC BLOOD PRESSURE: 79 MMHG | OXYGEN SATURATION: 96 %

## 2021-12-30 DIAGNOSIS — C45.0 MALIGNANT MESOTHELIOMA OF PLEURA: ICD-10-CM

## 2021-12-30 DIAGNOSIS — D64.81 ANTINEOPLASTIC CHEMOTHERAPY INDUCED ANEMIA: ICD-10-CM

## 2021-12-30 DIAGNOSIS — C45.0 MALIGNANT MESOTHELIOMA OF PLEURA: Primary | ICD-10-CM

## 2021-12-30 DIAGNOSIS — T85.79XA: ICD-10-CM

## 2021-12-30 DIAGNOSIS — R45.89 ANXIETY ABOUT HEALTH: ICD-10-CM

## 2021-12-30 DIAGNOSIS — T45.1X5A ANTINEOPLASTIC CHEMOTHERAPY INDUCED ANEMIA: ICD-10-CM

## 2021-12-30 LAB
ALBUMIN SERPL BCP-MCNC: 3.1 G/DL (ref 3.5–5.2)
ALP SERPL-CCNC: 236 U/L (ref 55–135)
ALT SERPL W/O P-5'-P-CCNC: 22 U/L (ref 10–44)
ANION GAP SERPL CALC-SCNC: 9 MMOL/L (ref 8–16)
AST SERPL-CCNC: 19 U/L (ref 10–40)
BASOPHILS # BLD AUTO: 0.11 K/UL (ref 0–0.2)
BASOPHILS NFR BLD: 0.6 % (ref 0–1.9)
BILIRUB SERPL-MCNC: 0.3 MG/DL (ref 0.1–1)
BUN SERPL-MCNC: 27 MG/DL (ref 8–23)
CALCIUM SERPL-MCNC: 9.4 MG/DL (ref 8.7–10.5)
CHLORIDE SERPL-SCNC: 102 MMOL/L (ref 95–110)
CO2 SERPL-SCNC: 26 MMOL/L (ref 23–29)
CREAT SERPL-MCNC: 1.4 MG/DL (ref 0.5–1.4)
DIFFERENTIAL METHOD: ABNORMAL
EOSINOPHIL # BLD AUTO: 0.3 K/UL (ref 0–0.5)
EOSINOPHIL NFR BLD: 1.4 % (ref 0–8)
ERYTHROCYTE [DISTWIDTH] IN BLOOD BY AUTOMATED COUNT: 17.2 % (ref 11.5–14.5)
EST. GFR  (AFRICAN AMERICAN): 42 ML/MIN/1.73 M^2
EST. GFR  (NON AFRICAN AMERICAN): 37 ML/MIN/1.73 M^2
GLUCOSE SERPL-MCNC: 152 MG/DL (ref 70–110)
HCT VFR BLD AUTO: 32.6 % (ref 37–48.5)
HGB BLD-MCNC: 10.1 G/DL (ref 12–16)
IMM GRANULOCYTES # BLD AUTO: 0.17 K/UL (ref 0–0.04)
IMM GRANULOCYTES NFR BLD AUTO: 1 % (ref 0–0.5)
LDH SERPL L TO P-CCNC: 206 U/L (ref 110–260)
LYMPHOCYTES # BLD AUTO: 1.6 K/UL (ref 1–4.8)
LYMPHOCYTES NFR BLD: 8.9 % (ref 18–48)
MAGNESIUM SERPL-MCNC: 2 MG/DL (ref 1.6–2.6)
MCH RBC QN AUTO: 30.5 PG (ref 27–31)
MCHC RBC AUTO-ENTMCNC: 31 G/DL (ref 32–36)
MCV RBC AUTO: 99 FL (ref 82–98)
MONOCYTES # BLD AUTO: 1.9 K/UL (ref 0.3–1)
MONOCYTES NFR BLD: 10.9 % (ref 4–15)
NEUTROPHILS # BLD AUTO: 13.7 K/UL (ref 1.8–7.7)
NEUTROPHILS NFR BLD: 77.2 % (ref 38–73)
NRBC BLD-RTO: 0 /100 WBC
PLATELET # BLD AUTO: 618 K/UL (ref 150–450)
PMV BLD AUTO: 10.3 FL (ref 9.2–12.9)
POTASSIUM SERPL-SCNC: 4.6 MMOL/L (ref 3.5–5.1)
PROT SERPL-MCNC: 8.1 G/DL (ref 6–8.4)
RBC # BLD AUTO: 3.31 M/UL (ref 4–5.4)
SODIUM SERPL-SCNC: 137 MMOL/L (ref 136–145)
WBC # BLD AUTO: 17.75 K/UL (ref 3.9–12.7)

## 2021-12-30 PROCEDURE — 1159F MED LIST DOCD IN RCRD: CPT | Mod: CPTII,S$GLB,,

## 2021-12-30 PROCEDURE — 4010F ACE/ARB THERAPY RXD/TAKEN: CPT | Mod: CPTII,S$GLB,,

## 2021-12-30 PROCEDURE — 36415 COLL VENOUS BLD VENIPUNCTURE: CPT | Mod: PN | Performed by: INTERNAL MEDICINE

## 2021-12-30 PROCEDURE — 1159F PR MEDICATION LIST DOCUMENTED IN MEDICAL RECORD: ICD-10-PCS | Mod: CPTII,S$GLB,,

## 2021-12-30 PROCEDURE — 3288F FALL RISK ASSESSMENT DOCD: CPT | Mod: CPTII,S$GLB,,

## 2021-12-30 PROCEDURE — 83735 ASSAY OF MAGNESIUM: CPT | Mod: PN | Performed by: INTERNAL MEDICINE

## 2021-12-30 PROCEDURE — 3075F PR MOST RECENT SYSTOLIC BLOOD PRESS GE 130-139MM HG: ICD-10-PCS | Mod: CPTII,S$GLB,,

## 2021-12-30 PROCEDURE — 1101F PR PT FALLS ASSESS DOC 0-1 FALLS W/OUT INJ PAST YR: ICD-10-PCS | Mod: CPTII,S$GLB,,

## 2021-12-30 PROCEDURE — 3075F SYST BP GE 130 - 139MM HG: CPT | Mod: CPTII,S$GLB,,

## 2021-12-30 PROCEDURE — 80053 COMPREHEN METABOLIC PANEL: CPT | Mod: PN | Performed by: INTERNAL MEDICINE

## 2021-12-30 PROCEDURE — 99999 PR PBB SHADOW E&M-EST. PATIENT-LVL III: ICD-10-PCS | Mod: PBBFAC,,,

## 2021-12-30 PROCEDURE — 99213 PR OFFICE/OUTPT VISIT, EST, LEVL III, 20-29 MIN: ICD-10-PCS | Mod: S$GLB,,,

## 2021-12-30 PROCEDURE — 99999 PR PBB SHADOW E&M-EST. PATIENT-LVL III: CPT | Mod: PBBFAC,,,

## 2021-12-30 PROCEDURE — 1160F RVW MEDS BY RX/DR IN RCRD: CPT | Mod: CPTII,S$GLB,,

## 2021-12-30 PROCEDURE — 85025 COMPLETE CBC W/AUTO DIFF WBC: CPT | Mod: PN | Performed by: INTERNAL MEDICINE

## 2021-12-30 PROCEDURE — 1160F PR REVIEW ALL MEDS BY PRESCRIBER/CLIN PHARMACIST DOCUMENTED: ICD-10-PCS | Mod: CPTII,S$GLB,,

## 2021-12-30 PROCEDURE — 4010F PR ACE/ARB THEARPY RXD/TAKEN: ICD-10-PCS | Mod: CPTII,S$GLB,,

## 2021-12-30 PROCEDURE — 99213 OFFICE O/P EST LOW 20 MIN: CPT | Mod: S$GLB,,,

## 2021-12-30 PROCEDURE — 1126F PR PAIN SEVERITY QUANTIFIED, NO PAIN PRESENT: ICD-10-PCS | Mod: CPTII,S$GLB,,

## 2021-12-30 PROCEDURE — 3078F PR MOST RECENT DIASTOLIC BLOOD PRESSURE < 80 MM HG: ICD-10-PCS | Mod: CPTII,S$GLB,,

## 2021-12-30 PROCEDURE — 3288F PR FALLS RISK ASSESSMENT DOCUMENTED: ICD-10-PCS | Mod: CPTII,S$GLB,,

## 2021-12-30 PROCEDURE — 3078F DIAST BP <80 MM HG: CPT | Mod: CPTII,S$GLB,,

## 2021-12-30 PROCEDURE — 1126F AMNT PAIN NOTED NONE PRSNT: CPT | Mod: CPTII,S$GLB,,

## 2021-12-30 PROCEDURE — 1101F PT FALLS ASSESS-DOCD LE1/YR: CPT | Mod: CPTII,S$GLB,,

## 2021-12-30 PROCEDURE — 83615 LACTATE (LD) (LDH) ENZYME: CPT | Mod: PN | Performed by: INTERNAL MEDICINE

## 2021-12-30 RX ORDER — CIPROFLOXACIN 500 MG/1
500 TABLET ORAL 2 TIMES DAILY
Qty: 14 TABLET | Refills: 0 | Status: SHIPPED | OUTPATIENT
Start: 2021-12-30 | End: 2022-01-06

## 2021-12-30 RX ORDER — LORATADINE 10 MG/1
1 TABLET ORAL DAILY
COMMUNITY

## 2022-01-06 ENCOUNTER — INFUSION (OUTPATIENT)
Dept: INFUSION THERAPY | Facility: HOSPITAL | Age: 77
End: 2022-01-06
Attending: INTERNAL MEDICINE
Payer: MEDICARE

## 2022-01-06 ENCOUNTER — OFFICE VISIT (OUTPATIENT)
Dept: HEMATOLOGY/ONCOLOGY | Facility: CLINIC | Age: 77
End: 2022-01-06
Payer: MEDICARE

## 2022-01-06 ENCOUNTER — PATIENT MESSAGE (OUTPATIENT)
Dept: HEMATOLOGY/ONCOLOGY | Facility: CLINIC | Age: 77
End: 2022-01-06

## 2022-01-06 VITALS
BODY MASS INDEX: 22.31 KG/M2 | SYSTOLIC BLOOD PRESSURE: 127 MMHG | RESPIRATION RATE: 18 BRPM | WEIGHT: 121.25 LBS | HEIGHT: 62 IN | OXYGEN SATURATION: 96 % | DIASTOLIC BLOOD PRESSURE: 73 MMHG | TEMPERATURE: 97 F | HEART RATE: 98 BPM

## 2022-01-06 VITALS
DIASTOLIC BLOOD PRESSURE: 83 MMHG | RESPIRATION RATE: 18 BRPM | SYSTOLIC BLOOD PRESSURE: 150 MMHG | WEIGHT: 121.25 LBS | BODY MASS INDEX: 22.31 KG/M2 | HEART RATE: 107 BPM | HEIGHT: 62 IN | TEMPERATURE: 97 F | OXYGEN SATURATION: 96 %

## 2022-01-06 DIAGNOSIS — F32.0 CURRENT MILD EPISODE OF MAJOR DEPRESSIVE DISORDER WITHOUT PRIOR EPISODE: ICD-10-CM

## 2022-01-06 DIAGNOSIS — T45.1X5A ANTINEOPLASTIC CHEMOTHERAPY INDUCED ANEMIA: ICD-10-CM

## 2022-01-06 DIAGNOSIS — C45.0 MALIGNANT MESOTHELIOMA OF PLEURA: Primary | ICD-10-CM

## 2022-01-06 DIAGNOSIS — D64.81 ANTINEOPLASTIC CHEMOTHERAPY INDUCED ANEMIA: ICD-10-CM

## 2022-01-06 DIAGNOSIS — K20.90 ESOPHAGITIS: ICD-10-CM

## 2022-01-06 DIAGNOSIS — T85.79XA: ICD-10-CM

## 2022-01-06 PROCEDURE — 1126F AMNT PAIN NOTED NONE PRSNT: CPT | Mod: CPTII,S$GLB,, | Performed by: INTERNAL MEDICINE

## 2022-01-06 PROCEDURE — A4216 STERILE WATER/SALINE, 10 ML: HCPCS | Mod: PN | Performed by: INTERNAL MEDICINE

## 2022-01-06 PROCEDURE — 3077F SYST BP >= 140 MM HG: CPT | Mod: CPTII,S$GLB,, | Performed by: INTERNAL MEDICINE

## 2022-01-06 PROCEDURE — 99214 OFFICE O/P EST MOD 30 MIN: CPT | Mod: S$GLB,,, | Performed by: INTERNAL MEDICINE

## 2022-01-06 PROCEDURE — 99999 PR PBB SHADOW E&M-EST. PATIENT-LVL III: CPT | Mod: PBBFAC,,, | Performed by: INTERNAL MEDICINE

## 2022-01-06 PROCEDURE — 96413 CHEMO IV INFUSION 1 HR: CPT | Mod: PN

## 2022-01-06 PROCEDURE — 1126F PR PAIN SEVERITY QUANTIFIED, NO PAIN PRESENT: ICD-10-PCS | Mod: CPTII,S$GLB,, | Performed by: INTERNAL MEDICINE

## 2022-01-06 PROCEDURE — 96375 TX/PRO/DX INJ NEW DRUG ADDON: CPT | Mod: PN

## 2022-01-06 PROCEDURE — 1160F RVW MEDS BY RX/DR IN RCRD: CPT | Mod: CPTII,S$GLB,, | Performed by: INTERNAL MEDICINE

## 2022-01-06 PROCEDURE — 99214 PR OFFICE/OUTPT VISIT, EST, LEVL IV, 30-39 MIN: ICD-10-PCS | Mod: S$GLB,,, | Performed by: INTERNAL MEDICINE

## 2022-01-06 PROCEDURE — 3077F PR MOST RECENT SYSTOLIC BLOOD PRESSURE >= 140 MM HG: ICD-10-PCS | Mod: CPTII,S$GLB,, | Performed by: INTERNAL MEDICINE

## 2022-01-06 PROCEDURE — 1159F MED LIST DOCD IN RCRD: CPT | Mod: CPTII,S$GLB,, | Performed by: INTERNAL MEDICINE

## 2022-01-06 PROCEDURE — 1159F PR MEDICATION LIST DOCUMENTED IN MEDICAL RECORD: ICD-10-PCS | Mod: CPTII,S$GLB,, | Performed by: INTERNAL MEDICINE

## 2022-01-06 PROCEDURE — 3079F DIAST BP 80-89 MM HG: CPT | Mod: CPTII,S$GLB,, | Performed by: INTERNAL MEDICINE

## 2022-01-06 PROCEDURE — 1101F PT FALLS ASSESS-DOCD LE1/YR: CPT | Mod: CPTII,S$GLB,, | Performed by: INTERNAL MEDICINE

## 2022-01-06 PROCEDURE — 99999 PR PBB SHADOW E&M-EST. PATIENT-LVL III: ICD-10-PCS | Mod: PBBFAC,,, | Performed by: INTERNAL MEDICINE

## 2022-01-06 PROCEDURE — 1160F PR REVIEW ALL MEDS BY PRESCRIBER/CLIN PHARMACIST DOCUMENTED: ICD-10-PCS | Mod: CPTII,S$GLB,, | Performed by: INTERNAL MEDICINE

## 2022-01-06 PROCEDURE — 63600175 PHARM REV CODE 636 W HCPCS: Mod: PN | Performed by: INTERNAL MEDICINE

## 2022-01-06 PROCEDURE — 3288F PR FALLS RISK ASSESSMENT DOCUMENTED: ICD-10-PCS | Mod: CPTII,S$GLB,, | Performed by: INTERNAL MEDICINE

## 2022-01-06 PROCEDURE — 1101F PR PT FALLS ASSESS DOC 0-1 FALLS W/OUT INJ PAST YR: ICD-10-PCS | Mod: CPTII,S$GLB,, | Performed by: INTERNAL MEDICINE

## 2022-01-06 PROCEDURE — 3079F PR MOST RECENT DIASTOLIC BLOOD PRESSURE 80-89 MM HG: ICD-10-PCS | Mod: CPTII,S$GLB,, | Performed by: INTERNAL MEDICINE

## 2022-01-06 PROCEDURE — 3288F FALL RISK ASSESSMENT DOCD: CPT | Mod: CPTII,S$GLB,, | Performed by: INTERNAL MEDICINE

## 2022-01-06 PROCEDURE — 25000003 PHARM REV CODE 250: Mod: PN | Performed by: INTERNAL MEDICINE

## 2022-01-06 RX ORDER — SODIUM CHLORIDE 0.9 % (FLUSH) 0.9 %
10 SYRINGE (ML) INJECTION
Status: DISCONTINUED | OUTPATIENT
Start: 2022-01-06 | End: 2022-01-06 | Stop reason: HOSPADM

## 2022-01-06 RX ORDER — DEXAMETHASONE SODIUM PHOSPHATE 4 MG/ML
4 INJECTION, SOLUTION INTRA-ARTICULAR; INTRALESIONAL; INTRAMUSCULAR; INTRAVENOUS; SOFT TISSUE
Status: CANCELLED
Start: 2022-01-06

## 2022-01-06 RX ORDER — HEPARIN 100 UNIT/ML
500 SYRINGE INTRAVENOUS
Status: CANCELLED | OUTPATIENT
Start: 2022-01-06

## 2022-01-06 RX ORDER — DEXAMETHASONE SODIUM PHOSPHATE 4 MG/ML
4 INJECTION, SOLUTION INTRA-ARTICULAR; INTRALESIONAL; INTRAMUSCULAR; INTRAVENOUS; SOFT TISSUE
Status: COMPLETED | OUTPATIENT
Start: 2022-01-06 | End: 2022-01-06

## 2022-01-06 RX ORDER — HEPARIN 100 UNIT/ML
500 SYRINGE INTRAVENOUS
Status: DISCONTINUED | OUTPATIENT
Start: 2022-01-06 | End: 2022-01-06 | Stop reason: HOSPADM

## 2022-01-06 RX ORDER — SODIUM CHLORIDE 0.9 % (FLUSH) 0.9 %
10 SYRINGE (ML) INJECTION
Status: CANCELLED | OUTPATIENT
Start: 2022-01-06

## 2022-01-06 RX ADMIN — HEPARIN 500 UNITS: 100 SYRINGE at 03:01

## 2022-01-06 RX ADMIN — Medication 10 ML: at 03:01

## 2022-01-06 RX ADMIN — SODIUM CHLORIDE: 0.9 INJECTION, SOLUTION INTRAVENOUS at 02:01

## 2022-01-06 RX ADMIN — DEXAMETHASONE SODIUM PHOSPHATE 4 MG: 4 INJECTION INTRA-ARTICULAR; INTRALESIONAL; INTRAMUSCULAR; INTRAVENOUS; SOFT TISSUE at 02:01

## 2022-01-06 RX ADMIN — GEMCITABINE 1600 MG: 38 INJECTION, SOLUTION INTRAVENOUS at 03:01

## 2022-01-06 NOTE — PLAN OF CARE
Problem: Adult Inpatient Plan of Care  Goal: Plan of Care Review  Outcome: Ongoing, Progressing  Flowsheets (Taken 1/6/2022 1542)  Plan of Care Reviewed With:   patient   family  Goal: Patient-Specific Goal (Individualized)  Outcome: Ongoing, Progressing  Flowsheets (Taken 1/6/2022 1542)  Anxieties, Fears or Concerns: trying to gain weight  Individualized Care Needs: snacks, education  Patient-Specific Goals (Include Timeframe): no signs of infection throughout therapy     Problem: Fatigue  Goal: Improved Activity Tolerance  Outcome: Ongoing, Progressing  Intervention: Promote Improved Energy  Flowsheets (Taken 1/6/2022 1542)  Fatigue Management:   paced activity encouraged   frequent rest breaks encouraged  Sleep/Rest Enhancement:   relaxation techniques promoted   natural light exposure provided   consistent schedule promoted   family presence promoted   therapeutic touch utilized  Activity Management: Ambulated -L4  Pt arrived to clinic for Gemzar infusion and tolerated well with no changes throughout therapy. Pt aware of side effects and f/u appts and discharged to home  with niece in NAD.

## 2022-01-06 NOTE — PROGRESS NOTES
History of present illness:  The patient is a 76-year-old white female recently discovered to have pleural mesothelioma.  Patient began experiencing difficulties with shortness of breath last fall.  She was eventually discovered to have pleural effusion and had a drain.  However recurred.  Patient eventually underwent pleural biopsy which returned positive for malignant mesothelioma.  She also had PleurX catheter placed to assist with periodic drainage of pleural fluid.  She drains every 2 days and obtains anywhere from 150-200 cc per drain.  Patient has some difficulties with thoracic discomfort and has hydrocodone 7.5-325 on hand.  She takes this only at night.  She denies weight loss, productive cough, hemoptysis, or worsening dyspnea.  She has been seen and evaluated by Dr. Novak and is scheduled to undergo staging laparoscopy to determine whether not she has spread of disease across the diaphragm into the abdomen.  Consult has been requested for assistance with use of neoadjuvant or palliative chemotherapy.     Since initial office consultation, patient is undergone exploratory laparotomy.  She was found not to have any diaphragmatic involvement or intra-abdominal disease in association with her diagnosis of malignant mesothelioma.  She returns to clinic to review interval studies in preparation for neoadjuvant chemotherapy.  MediPort catheter has been placed within the patient's right chest.     Patient returns to clinic for re-evaluation following 4 cycles of cisplatin/Alimta.  Review of imaging and discussions in tumor board following completion of neoadjuvant therapy were for maintenance Alimta as opposed to surgical intervention.  However, patient's performance status was too poor to allow for such and she was observed until performance status would allow for therapy.  She has completed 1 cycle of maintenance Alimta and returns to clinic for toxicity assessment part way through her cycle.  Patient  reports increasing fatigue and disequilibrium.  She also has intermittent, evening, postprandial nausea emesis.  She is experiencing worsening dyspnea on exertion.  Pleural catheter drainage is nil.    Patient returns to clinic to review interval lab prior to initiation of cycle 4 of Keytruda.  Patient, unfortunately, failed therapy with Keytruda and has been transitioned to single agent Gemzar.  She returns to clinic for evaluation prior to cycle 2, day 1 of therapy.  This was previously delayed due to purulent drainage from patient's PleurX catheter site.  The catheter came out spontaneously.  Patient's wound has healed.  She has completed prescribed antibiotics.      Physical examination:  Well-developed, well-nourished, elderly, white female, no acute distress, who has a weight of 121.5 lb (increased by 3 lb).  VITAL SIGNS: Documented  and reviewed this visit.  HEENT: Normocephalic, atraumatic. Oral mucosa pink and moist. Lips without lesions. Tongue midline. Oropharynx clear. Nonicteric sclerae.   NECK: Supple, no adenopathy. No carotid bruits, thyromegaly or thyroid nodule.   HEART: Regular rate and rhythm without murmur, gallop or rub.   LUNGS:  Absent breath sounds over the right hemithorax.  Otherwise, clear to auscultation with normal respiratory effort.    ABDOMEN: Soft, nontender, nondistended with positive normoactive bowel sounds, no hepatosplenomegaly.   EXTREMITIES: No cyanosis, clubbing or edema. Distal pulses are intact.   AXILLAE AND GROIN: No palpable pathologic lymphadenopathy is appreciated.   SKIN: Intact/turgor normal.  MediPort catheter present within the right chest.  Surgical wound is well approximated and healed.  It is free from signs of infection.    NEUROLOGIC: Cranial nerves II-XII grossly intact. Motor: Good muscle bulk and tone. Strength/sensory 5/5 throughout. Gait stable.     Laboratory:  White count 13.2, hemoglobin 10, hematocrit 32.1, platelets 584, absolute neutrophil count  9700.  Sodium 138, potassium 4.8, chloride 105, CO2 26, BUN 25, creatinine 1.2, glucose 116, calcium 9.2, magnesium 1.8, alkaline phosphatase 177, albumin 3.2, liver function test with normal limits, , GFR is 44.     Guardant 360 panel:  Remarkable for TP53 mutation and an EGFR mutation of uncertain clinical significance.  Tumor was not MSI high.    Impression:  1. Malignant pleural mesothelioma with evidence of disease progression on most recent imaging.    2. Therapy associated fatigue malaise.  3. Depression/anxiety around issues of health.    4. Esophagitis - S/P dilatation and biopsy.  5. Mild chemotherapy associated anemia not in need intervention.    Plan:  1.  Proceed with cycle 2, day 1 of therapy.  2. Return to clinic in 1 week with interval CBC.     This note was created using voice recognition software and may contain grammatical errors.

## 2022-01-12 ENCOUNTER — OFFICE VISIT (OUTPATIENT)
Dept: HEMATOLOGY/ONCOLOGY | Facility: CLINIC | Age: 77
End: 2022-01-12
Payer: MEDICARE

## 2022-01-12 ENCOUNTER — PATIENT MESSAGE (OUTPATIENT)
Dept: HEMATOLOGY/ONCOLOGY | Facility: CLINIC | Age: 77
End: 2022-01-12

## 2022-01-12 ENCOUNTER — LAB VISIT (OUTPATIENT)
Dept: LAB | Facility: HOSPITAL | Age: 77
End: 2022-01-12
Attending: INTERNAL MEDICINE
Payer: MEDICARE

## 2022-01-12 VITALS
RESPIRATION RATE: 18 BRPM | WEIGHT: 121.06 LBS | DIASTOLIC BLOOD PRESSURE: 78 MMHG | BODY MASS INDEX: 22.28 KG/M2 | HEIGHT: 62 IN | HEART RATE: 101 BPM | SYSTOLIC BLOOD PRESSURE: 150 MMHG | OXYGEN SATURATION: 97 % | TEMPERATURE: 97 F

## 2022-01-12 DIAGNOSIS — C45.0 MALIGNANT MESOTHELIOMA OF PLEURA: Primary | ICD-10-CM

## 2022-01-12 DIAGNOSIS — D64.81 ANTINEOPLASTIC CHEMOTHERAPY INDUCED ANEMIA: ICD-10-CM

## 2022-01-12 DIAGNOSIS — R53.83 FATIGUE, UNSPECIFIED TYPE: ICD-10-CM

## 2022-01-12 DIAGNOSIS — K20.90 ESOPHAGITIS: ICD-10-CM

## 2022-01-12 DIAGNOSIS — C45.0 MALIGNANT MESOTHELIOMA OF PLEURA: ICD-10-CM

## 2022-01-12 DIAGNOSIS — T45.1X5A ANTINEOPLASTIC CHEMOTHERAPY INDUCED ANEMIA: ICD-10-CM

## 2022-01-12 DIAGNOSIS — F32.0 CURRENT MILD EPISODE OF MAJOR DEPRESSIVE DISORDER WITHOUT PRIOR EPISODE: ICD-10-CM

## 2022-01-12 LAB
BASOPHILS # BLD AUTO: 0.09 K/UL (ref 0–0.2)
BASOPHILS NFR BLD: 0.9 % (ref 0–1.9)
DIFFERENTIAL METHOD: ABNORMAL
EOSINOPHIL # BLD AUTO: 0.2 K/UL (ref 0–0.5)
EOSINOPHIL NFR BLD: 2 % (ref 0–8)
ERYTHROCYTE [DISTWIDTH] IN BLOOD BY AUTOMATED COUNT: 16.3 % (ref 11.5–14.5)
HCT VFR BLD AUTO: 31 % (ref 37–48.5)
HGB BLD-MCNC: 10.2 G/DL (ref 12–16)
IMM GRANULOCYTES # BLD AUTO: 0.03 K/UL (ref 0–0.04)
IMM GRANULOCYTES NFR BLD AUTO: 0.3 % (ref 0–0.5)
LYMPHOCYTES # BLD AUTO: 1.4 K/UL (ref 1–4.8)
LYMPHOCYTES NFR BLD: 15 % (ref 18–48)
MCH RBC QN AUTO: 32.1 PG (ref 27–31)
MCHC RBC AUTO-ENTMCNC: 32.9 G/DL (ref 32–36)
MCV RBC AUTO: 98 FL (ref 82–98)
MONOCYTES # BLD AUTO: 0.7 K/UL (ref 0.3–1)
MONOCYTES NFR BLD: 6.9 % (ref 4–15)
NEUTROPHILS # BLD AUTO: 7.1 K/UL (ref 1.8–7.7)
NEUTROPHILS NFR BLD: 74.9 % (ref 38–73)
NRBC BLD-RTO: 0 /100 WBC
PLATELET # BLD AUTO: 273 K/UL (ref 150–450)
PLATELET BLD QL SMEAR: ABNORMAL
PMV BLD AUTO: 9.5 FL (ref 9.2–12.9)
RBC # BLD AUTO: 3.18 M/UL (ref 4–5.4)
WBC # BLD AUTO: 9.52 K/UL (ref 3.9–12.7)

## 2022-01-12 PROCEDURE — 3077F PR MOST RECENT SYSTOLIC BLOOD PRESSURE >= 140 MM HG: ICD-10-PCS | Mod: CPTII,S$GLB,, | Performed by: INTERNAL MEDICINE

## 2022-01-12 PROCEDURE — 1160F RVW MEDS BY RX/DR IN RCRD: CPT | Mod: CPTII,S$GLB,, | Performed by: INTERNAL MEDICINE

## 2022-01-12 PROCEDURE — 3288F FALL RISK ASSESSMENT DOCD: CPT | Mod: CPTII,S$GLB,, | Performed by: INTERNAL MEDICINE

## 2022-01-12 PROCEDURE — 99999 PR PBB SHADOW E&M-EST. PATIENT-LVL V: ICD-10-PCS | Mod: PBBFAC,,, | Performed by: INTERNAL MEDICINE

## 2022-01-12 PROCEDURE — 1160F PR REVIEW ALL MEDS BY PRESCRIBER/CLIN PHARMACIST DOCUMENTED: ICD-10-PCS | Mod: CPTII,S$GLB,, | Performed by: INTERNAL MEDICINE

## 2022-01-12 PROCEDURE — 3077F SYST BP >= 140 MM HG: CPT | Mod: CPTII,S$GLB,, | Performed by: INTERNAL MEDICINE

## 2022-01-12 PROCEDURE — 85025 COMPLETE CBC W/AUTO DIFF WBC: CPT | Mod: PN | Performed by: INTERNAL MEDICINE

## 2022-01-12 PROCEDURE — 99214 OFFICE O/P EST MOD 30 MIN: CPT | Mod: S$GLB,,, | Performed by: INTERNAL MEDICINE

## 2022-01-12 PROCEDURE — 36415 COLL VENOUS BLD VENIPUNCTURE: CPT | Mod: PN | Performed by: INTERNAL MEDICINE

## 2022-01-12 PROCEDURE — 1159F MED LIST DOCD IN RCRD: CPT | Mod: CPTII,S$GLB,, | Performed by: INTERNAL MEDICINE

## 2022-01-12 PROCEDURE — 99214 PR OFFICE/OUTPT VISIT, EST, LEVL IV, 30-39 MIN: ICD-10-PCS | Mod: S$GLB,,, | Performed by: INTERNAL MEDICINE

## 2022-01-12 PROCEDURE — 1159F PR MEDICATION LIST DOCUMENTED IN MEDICAL RECORD: ICD-10-PCS | Mod: CPTII,S$GLB,, | Performed by: INTERNAL MEDICINE

## 2022-01-12 PROCEDURE — 3288F PR FALLS RISK ASSESSMENT DOCUMENTED: ICD-10-PCS | Mod: CPTII,S$GLB,, | Performed by: INTERNAL MEDICINE

## 2022-01-12 PROCEDURE — 1101F PR PT FALLS ASSESS DOC 0-1 FALLS W/OUT INJ PAST YR: ICD-10-PCS | Mod: CPTII,S$GLB,, | Performed by: INTERNAL MEDICINE

## 2022-01-12 PROCEDURE — 1126F PR PAIN SEVERITY QUANTIFIED, NO PAIN PRESENT: ICD-10-PCS | Mod: CPTII,S$GLB,, | Performed by: INTERNAL MEDICINE

## 2022-01-12 PROCEDURE — 1101F PT FALLS ASSESS-DOCD LE1/YR: CPT | Mod: CPTII,S$GLB,, | Performed by: INTERNAL MEDICINE

## 2022-01-12 PROCEDURE — 1126F AMNT PAIN NOTED NONE PRSNT: CPT | Mod: CPTII,S$GLB,, | Performed by: INTERNAL MEDICINE

## 2022-01-12 PROCEDURE — 3078F DIAST BP <80 MM HG: CPT | Mod: CPTII,S$GLB,, | Performed by: INTERNAL MEDICINE

## 2022-01-12 PROCEDURE — 3078F PR MOST RECENT DIASTOLIC BLOOD PRESSURE < 80 MM HG: ICD-10-PCS | Mod: CPTII,S$GLB,, | Performed by: INTERNAL MEDICINE

## 2022-01-12 PROCEDURE — 99999 PR PBB SHADOW E&M-EST. PATIENT-LVL V: CPT | Mod: PBBFAC,,, | Performed by: INTERNAL MEDICINE

## 2022-01-12 RX ORDER — DEXAMETHASONE SODIUM PHOSPHATE 4 MG/ML
4 INJECTION, SOLUTION INTRA-ARTICULAR; INTRALESIONAL; INTRAMUSCULAR; INTRAVENOUS; SOFT TISSUE
Status: CANCELLED
Start: 2022-01-13

## 2022-01-12 RX ORDER — SODIUM CHLORIDE 0.9 % (FLUSH) 0.9 %
10 SYRINGE (ML) INJECTION
Status: CANCELLED | OUTPATIENT
Start: 2022-01-13

## 2022-01-12 RX ORDER — HEPARIN 100 UNIT/ML
500 SYRINGE INTRAVENOUS
Status: CANCELLED | OUTPATIENT
Start: 2022-01-13

## 2022-01-12 NOTE — PROGRESS NOTES
History of present illness:  The patient is a 76-year-old white female recently discovered to have pleural mesothelioma.  Patient began experiencing difficulties with shortness of breath last fall.  She was eventually discovered to have pleural effusion and had a drain.  However recurred.  Patient eventually underwent pleural biopsy which returned positive for malignant mesothelioma.  She also had PleurX catheter placed to assist with periodic drainage of pleural fluid.  She drains every 2 days and obtains anywhere from 150-200 cc per drain.  Patient has some difficulties with thoracic discomfort and has hydrocodone 7.5-325 on hand.  She takes this only at night.  She denies weight loss, productive cough, hemoptysis, or worsening dyspnea.  She has been seen and evaluated by Dr. Novak and is scheduled to undergo staging laparoscopy to determine whether not she has spread of disease across the diaphragm into the abdomen.  Consult has been requested for assistance with use of neoadjuvant or palliative chemotherapy.     Since initial office consultation, patient is undergone exploratory laparotomy.  She was found not to have any diaphragmatic involvement or intra-abdominal disease in association with her diagnosis of malignant mesothelioma.  She returns to clinic to review interval studies in preparation for neoadjuvant chemotherapy.  MediPort catheter has been placed within the patient's right chest.     Patient returns to clinic for re-evaluation following 4 cycles of cisplatin/Alimta.  Review of imaging and discussions in tumor board following completion of neoadjuvant therapy were for maintenance Alimta as opposed to surgical intervention.  However, patient's performance status was too poor to allow for such and she was observed until performance status would allow for therapy.  She has completed 1 cycle of maintenance Alimta and returns to clinic for toxicity assessment part way through her cycle.  Patient  reports increasing fatigue and disequilibrium.  She also has intermittent, evening, postprandial nausea emesis.  She is experiencing worsening dyspnea on exertion.  Pleural catheter drainage is nil.    Patient returns to clinic to review interval lab prior to initiation of cycle 4 of Keytruda.  Patient, unfortunately, failed therapy with Keytruda and has been transitioned to single agent Gemzar.  She returns to clinic for evaluation prior to cycle 2, day 8 of therapy.  Patient tolerated last infusion without difficulty.  She denies mouth sores, had emesis, diarrhea rash, pain, worsening dyspnea.      Physical examination:  Well-developed, well-nourished, elderly, white female, no acute distress, who has a weight of 121.5 lb (increased by 3 lb).  VITAL SIGNS: Documented  and reviewed this visit.  HEENT: Normocephalic, atraumatic. Oral mucosa pink and moist. Lips without lesions. Tongue midline. Oropharynx clear. Nonicteric sclerae.   NECK: Supple, no adenopathy. No carotid bruits, thyromegaly or thyroid nodule.   HEART: Regular rate and rhythm without murmur, gallop or rub.   LUNGS:  Absent breath sounds over the right hemithorax.  Otherwise, clear to auscultation with normal respiratory effort.    ABDOMEN: Soft, nontender, nondistended with positive normoactive bowel sounds, no hepatosplenomegaly.   EXTREMITIES: No cyanosis, clubbing or edema. Distal pulses are intact.   AXILLAE AND GROIN: No palpable pathologic lymphadenopathy is appreciated.   SKIN: Intact/turgor normal.  MediPort catheter present within the right chest.  Surgical wound is well approximated and healed.  It is free from signs of infection.    NEUROLOGIC: Cranial nerves II-XII grossly intact. Motor: Good muscle bulk and tone. Strength/sensory 5/5 throughout. Gait stable.     Laboratory:  White count 9.5, hemoglobin 10.2, hematocrit 31, platelets 273, absolute neutrophil count is 7100.      Guardant 360 panel:  Remarkable for TP53 mutation and an  EGFR mutation of uncertain clinical significance.  Tumor was not MSI high.    Impression:  1. Malignant pleural mesothelioma with evidence of disease progression on most recent imaging.    2. Therapy associated fatigue malaise.  3. Depression/anxiety around issues of health.    4. Esophagitis - S/P dilatation and biopsy.  5. Mild chemotherapy associated anemia not in need intervention.    Plan:  1.  Proceed with cycle 2, day 8 of therapy.  2. Return to clinic in 2 weeks with interval CBC, CMP, LDH, magnesium prior to appointment.     This note was created using voice recognition software and may contain grammatical errors.

## 2022-01-13 ENCOUNTER — INFUSION (OUTPATIENT)
Dept: INFUSION THERAPY | Facility: HOSPITAL | Age: 77
End: 2022-01-13
Attending: INTERNAL MEDICINE
Payer: MEDICARE

## 2022-01-13 ENCOUNTER — DOCUMENTATION ONLY (OUTPATIENT)
Dept: INFUSION THERAPY | Facility: HOSPITAL | Age: 77
End: 2022-01-13
Payer: MEDICARE

## 2022-01-13 VITALS
SYSTOLIC BLOOD PRESSURE: 150 MMHG | HEART RATE: 93 BPM | DIASTOLIC BLOOD PRESSURE: 84 MMHG | RESPIRATION RATE: 18 BRPM | TEMPERATURE: 98 F

## 2022-01-13 DIAGNOSIS — C45.0 MALIGNANT MESOTHELIOMA OF PLEURA: Primary | ICD-10-CM

## 2022-01-13 PROCEDURE — 25000003 PHARM REV CODE 250: Mod: PN | Performed by: INTERNAL MEDICINE

## 2022-01-13 PROCEDURE — 96375 TX/PRO/DX INJ NEW DRUG ADDON: CPT | Mod: PN

## 2022-01-13 PROCEDURE — 96413 CHEMO IV INFUSION 1 HR: CPT | Mod: PN

## 2022-01-13 PROCEDURE — 96367 TX/PROPH/DG ADDL SEQ IV INF: CPT | Mod: PN

## 2022-01-13 PROCEDURE — A4216 STERILE WATER/SALINE, 10 ML: HCPCS | Mod: PN | Performed by: INTERNAL MEDICINE

## 2022-01-13 PROCEDURE — 63600175 PHARM REV CODE 636 W HCPCS: Mod: PN | Performed by: INTERNAL MEDICINE

## 2022-01-13 RX ORDER — DEXAMETHASONE SODIUM PHOSPHATE 4 MG/ML
4 INJECTION, SOLUTION INTRA-ARTICULAR; INTRALESIONAL; INTRAMUSCULAR; INTRAVENOUS; SOFT TISSUE
Status: COMPLETED | OUTPATIENT
Start: 2022-01-13 | End: 2022-01-13

## 2022-01-13 RX ORDER — HEPARIN 100 UNIT/ML
500 SYRINGE INTRAVENOUS
Status: DISCONTINUED | OUTPATIENT
Start: 2022-01-13 | End: 2022-01-13 | Stop reason: HOSPADM

## 2022-01-13 RX ORDER — SODIUM CHLORIDE 0.9 % (FLUSH) 0.9 %
10 SYRINGE (ML) INJECTION
Status: DISCONTINUED | OUTPATIENT
Start: 2022-01-13 | End: 2022-01-13 | Stop reason: HOSPADM

## 2022-01-13 RX ADMIN — GEMCITABINE 1600 MG: 38 INJECTION, SOLUTION INTRAVENOUS at 02:01

## 2022-01-13 RX ADMIN — HEPARIN 500 UNITS: 100 SYRINGE at 03:01

## 2022-01-13 RX ADMIN — SODIUM CHLORIDE: 0.9 INJECTION, SOLUTION INTRAVENOUS at 02:01

## 2022-01-13 RX ADMIN — DEXAMETHASONE SODIUM PHOSPHATE 4 MG: 4 INJECTION INTRA-ARTICULAR; INTRALESIONAL; INTRAMUSCULAR; INTRAVENOUS; SOFT TISSUE at 02:01

## 2022-01-13 RX ADMIN — Medication 10 ML: at 03:01

## 2022-01-13 NOTE — PLAN OF CARE
Pt tolerated tx well today. Vitals remain stable. Reviewed follow-up appointments. All questions were answered, ambulated independently at d/c.

## 2022-01-13 NOTE — PROGRESS NOTES
Nutrition - Weight Check/Chart Review   Chart reviewed. Weight check completed on pt.     Wt Readings from Last 10 Encounters:   01/12/22 54.9 kg (121 lb 0.5 oz)   01/06/22 55 kg (121 lb 4.1 oz)   01/06/22 55 kg (121 lb 4.1 oz)   12/30/21 54.4 kg (119 lb 14.9 oz)   12/16/21 53.9 kg (118 lb 13.3 oz)   12/16/21 53.9 kg (118 lb 13.3 oz)   12/09/21 54.4 kg (119 lb 14.9 oz)   12/09/21 54 kg (119 lb 0.8 oz)   12/01/21 52.9 kg (116 lb 10 oz)   11/10/21 53.1 kg (117 lb 1 oz)        Weight is currently stable. Per nursing nutrition risk report at pt's infusion visit today, pt denies any nutritional concerns or challenges at this time. Per recent MD progress note, pt with no nutritional concerns reported.     RD to continue to monitor; no nutritional interventions are needed at this time.     NELLY MORALES MA, RD, LDN  01/13/2022  2:44 PM

## 2022-01-14 ENCOUNTER — INFUSION (OUTPATIENT)
Dept: INFUSION THERAPY | Facility: HOSPITAL | Age: 77
End: 2022-01-14
Attending: INTERNAL MEDICINE
Payer: MEDICARE

## 2022-01-14 VITALS
RESPIRATION RATE: 18 BRPM | OXYGEN SATURATION: 98 % | WEIGHT: 121.06 LBS | SYSTOLIC BLOOD PRESSURE: 141 MMHG | HEIGHT: 63 IN | BODY MASS INDEX: 21.45 KG/M2 | DIASTOLIC BLOOD PRESSURE: 71 MMHG | HEART RATE: 100 BPM | TEMPERATURE: 98 F

## 2022-01-14 DIAGNOSIS — C45.0 MALIGNANT MESOTHELIOMA OF PLEURA: Primary | ICD-10-CM

## 2022-01-14 PROCEDURE — 63600175 PHARM REV CODE 636 W HCPCS: Mod: TB,PN | Performed by: INTERNAL MEDICINE

## 2022-01-14 PROCEDURE — 96372 THER/PROPH/DIAG INJ SC/IM: CPT | Mod: PN

## 2022-01-14 RX ADMIN — PEGFILGRASTIM-CBQV 6 MG: 6 INJECTION, SOLUTION SUBCUTANEOUS at 09:01

## 2022-01-14 NOTE — PLAN OF CARE
Problem: Adult Inpatient Plan of Care  Goal: Plan of Care Review  Outcome: Ongoing, Progressing  Goal: Patient-Specific Goal (Individualized)  Outcome: Ongoing, Progressing     Problem: Fatigue  Goal: Improved Activity Tolerance  Outcome: Ongoing, Progressing   .Patient tolerated neulasta  injection well, d/c in no acute distress.

## 2022-01-27 ENCOUNTER — INFUSION (OUTPATIENT)
Dept: INFUSION THERAPY | Facility: HOSPITAL | Age: 77
End: 2022-01-27
Attending: INTERNAL MEDICINE
Payer: MEDICARE

## 2022-01-27 ENCOUNTER — OFFICE VISIT (OUTPATIENT)
Dept: HEMATOLOGY/ONCOLOGY | Facility: CLINIC | Age: 77
End: 2022-01-27
Payer: MEDICARE

## 2022-01-27 ENCOUNTER — HOSPITAL ENCOUNTER (OUTPATIENT)
Dept: RADIOLOGY | Facility: HOSPITAL | Age: 77
Discharge: HOME | End: 2022-01-27
Attending: INTERNAL MEDICINE
Payer: MEDICARE

## 2022-01-27 ENCOUNTER — PATIENT MESSAGE (OUTPATIENT)
Dept: HEMATOLOGY/ONCOLOGY | Facility: CLINIC | Age: 77
End: 2022-01-27

## 2022-01-27 VITALS
SYSTOLIC BLOOD PRESSURE: 144 MMHG | HEIGHT: 63 IN | DIASTOLIC BLOOD PRESSURE: 73 MMHG | RESPIRATION RATE: 17 BRPM | BODY MASS INDEX: 21.53 KG/M2 | OXYGEN SATURATION: 96 % | HEART RATE: 105 BPM | TEMPERATURE: 96 F | WEIGHT: 121.5 LBS

## 2022-01-27 VITALS
HEART RATE: 102 BPM | HEIGHT: 63 IN | TEMPERATURE: 96 F | DIASTOLIC BLOOD PRESSURE: 67 MMHG | OXYGEN SATURATION: 96 % | BODY MASS INDEX: 21.53 KG/M2 | RESPIRATION RATE: 16 BRPM | SYSTOLIC BLOOD PRESSURE: 135 MMHG | WEIGHT: 121.5 LBS

## 2022-01-27 DIAGNOSIS — F32.0 CURRENT MILD EPISODE OF MAJOR DEPRESSIVE DISORDER WITHOUT PRIOR EPISODE: ICD-10-CM
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
## 2022-01-27 DIAGNOSIS — N18.30 STAGE 3 CHRONIC KIDNEY DISEASE, UNSPECIFIED WHETHER STAGE 3A OR 3B CKD: ICD-10-CM

## 2022-01-27 DIAGNOSIS — C45.0 MALIGNANT MESOTHELIOMA OF PLEURA: Primary | ICD-10-CM

## 2022-01-27 DIAGNOSIS — T45.1X5A ANTINEOPLASTIC CHEMOTHERAPY INDUCED ANEMIA: ICD-10-CM

## 2022-01-27 DIAGNOSIS — D64.81 ANTINEOPLASTIC CHEMOTHERAPY INDUCED ANEMIA: ICD-10-CM

## 2022-01-27 DIAGNOSIS — M25.552 HIP PAIN, ACUTE, LEFT: ICD-10-CM

## 2022-01-27 DIAGNOSIS — K20.90 ESOPHAGITIS: ICD-10-CM

## 2022-01-27 DIAGNOSIS — R53.83 FATIGUE, UNSPECIFIED TYPE: ICD-10-CM

## 2022-01-27 PROCEDURE — 3077F SYST BP >= 140 MM HG: CPT | Mod: CPTII,S$GLB,, | Performed by: INTERNAL MEDICINE

## 2022-01-27 PROCEDURE — 96375 TX/PRO/DX INJ NEW DRUG ADDON: CPT | Mod: PN

## 2022-01-27 PROCEDURE — 73502 XR HIP WITH PELVIS WHEN PERFORMED, 2 OR 3 VIEWS LEFT: ICD-10-PCS | Mod: 26,LT,, | Performed by: RADIOLOGY

## 2022-01-27 PROCEDURE — 73502 X-RAY EXAM HIP UNI 2-3 VIEWS: CPT | Mod: 26,LT,, | Performed by: RADIOLOGY

## 2022-01-27 PROCEDURE — 99214 OFFICE O/P EST MOD 30 MIN: CPT | Mod: S$GLB,,, | Performed by: INTERNAL MEDICINE

## 2022-01-27 PROCEDURE — 1101F PR PT FALLS ASSESS DOC 0-1 FALLS W/OUT INJ PAST YR: ICD-10-PCS | Mod: CPTII,S$GLB,, | Performed by: INTERNAL MEDICINE

## 2022-01-27 PROCEDURE — 25000003 PHARM REV CODE 250: Mod: PN | Performed by: INTERNAL MEDICINE

## 2022-01-27 PROCEDURE — 63600175 PHARM REV CODE 636 W HCPCS: Mod: PN | Performed by: INTERNAL MEDICINE

## 2022-01-27 PROCEDURE — 1160F RVW MEDS BY RX/DR IN RCRD: CPT | Mod: CPTII,S$GLB,, | Performed by: INTERNAL MEDICINE

## 2022-01-27 PROCEDURE — 1126F PR PAIN SEVERITY QUANTIFIED, NO PAIN PRESENT: ICD-10-PCS | Mod: CPTII,S$GLB,, | Performed by: INTERNAL MEDICINE

## 2022-01-27 PROCEDURE — 3077F PR MOST RECENT SYSTOLIC BLOOD PRESSURE >= 140 MM HG: ICD-10-PCS | Mod: CPTII,S$GLB,, | Performed by: INTERNAL MEDICINE

## 2022-01-27 PROCEDURE — 99999 PR PBB SHADOW E&M-EST. PATIENT-LVL V: ICD-10-PCS | Mod: PBBFAC,,, | Performed by: INTERNAL MEDICINE

## 2022-01-27 PROCEDURE — 3288F FALL RISK ASSESSMENT DOCD: CPT | Mod: CPTII,S$GLB,, | Performed by: INTERNAL MEDICINE

## 2022-01-27 PROCEDURE — 1159F MED LIST DOCD IN RCRD: CPT | Mod: CPTII,S$GLB,, | Performed by: INTERNAL MEDICINE

## 2022-01-27 PROCEDURE — 1101F PT FALLS ASSESS-DOCD LE1/YR: CPT | Mod: CPTII,S$GLB,, | Performed by: INTERNAL MEDICINE

## 2022-01-27 PROCEDURE — 99214 PR OFFICE/OUTPT VISIT, EST, LEVL IV, 30-39 MIN: ICD-10-PCS | Mod: S$GLB,,, | Performed by: INTERNAL MEDICINE

## 2022-01-27 PROCEDURE — 1126F AMNT PAIN NOTED NONE PRSNT: CPT | Mod: CPTII,S$GLB,, | Performed by: INTERNAL MEDICINE

## 2022-01-27 PROCEDURE — 99999 PR PBB SHADOW E&M-EST. PATIENT-LVL V: CPT | Mod: PBBFAC,,, | Performed by: INTERNAL MEDICINE

## 2022-01-27 PROCEDURE — 3288F PR FALLS RISK ASSESSMENT DOCUMENTED: ICD-10-PCS | Mod: CPTII,S$GLB,, | Performed by: INTERNAL MEDICINE

## 2022-01-27 PROCEDURE — 73502 X-RAY EXAM HIP UNI 2-3 VIEWS: CPT | Mod: TC,FY,PO,LT

## 2022-01-27 PROCEDURE — 3078F DIAST BP <80 MM HG: CPT | Mod: CPTII,S$GLB,, | Performed by: INTERNAL MEDICINE

## 2022-01-27 PROCEDURE — 1160F PR REVIEW ALL MEDS BY PRESCRIBER/CLIN PHARMACIST DOCUMENTED: ICD-10-PCS | Mod: CPTII,S$GLB,, | Performed by: INTERNAL MEDICINE

## 2022-01-27 PROCEDURE — 1159F PR MEDICATION LIST DOCUMENTED IN MEDICAL RECORD: ICD-10-PCS | Mod: CPTII,S$GLB,, | Performed by: INTERNAL MEDICINE

## 2022-01-27 PROCEDURE — 3078F PR MOST RECENT DIASTOLIC BLOOD PRESSURE < 80 MM HG: ICD-10-PCS | Mod: CPTII,S$GLB,, | Performed by: INTERNAL MEDICINE

## 2022-01-27 PROCEDURE — 96413 CHEMO IV INFUSION 1 HR: CPT | Mod: PN

## 2022-01-27 PROCEDURE — A4216 STERILE WATER/SALINE, 10 ML: HCPCS | Mod: PN | Performed by: INTERNAL MEDICINE

## 2022-01-27 RX ORDER — DEXAMETHASONE SODIUM PHOSPHATE 4 MG/ML
4 INJECTION, SOLUTION INTRA-ARTICULAR; INTRALESIONAL; INTRAMUSCULAR; INTRAVENOUS; SOFT TISSUE
Status: COMPLETED | OUTPATIENT
Start: 2022-01-27 | End: 2022-01-27

## 2022-01-27 RX ORDER — DEXAMETHASONE SODIUM PHOSPHATE 4 MG/ML
4 INJECTION, SOLUTION INTRA-ARTICULAR; INTRALESIONAL; INTRAMUSCULAR; INTRAVENOUS; SOFT TISSUE
Status: CANCELLED
Start: 2022-01-27

## 2022-01-27 RX ORDER — HEPARIN 100 UNIT/ML
500 SYRINGE INTRAVENOUS
Status: CANCELLED | OUTPATIENT
Start: 2022-01-27

## 2022-01-27 RX ORDER — SODIUM CHLORIDE 0.9 % (FLUSH) 0.9 %
10 SYRINGE (ML) INJECTION
Status: DISCONTINUED | OUTPATIENT
Start: 2022-01-27 | End: 2022-01-27 | Stop reason: HOSPADM

## 2022-01-27 RX ORDER — HEPARIN 100 UNIT/ML
500 SYRINGE INTRAVENOUS
Status: DISCONTINUED | OUTPATIENT
Start: 2022-01-27 | End: 2022-01-27 | Stop reason: HOSPADM

## 2022-01-27 RX ORDER — SODIUM CHLORIDE 0.9 % (FLUSH) 0.9 %
10 SYRINGE (ML) INJECTION
Status: CANCELLED | OUTPATIENT
Start: 2022-01-27

## 2022-01-27 RX ADMIN — DEXAMETHASONE SODIUM PHOSPHATE 4 MG: 4 INJECTION INTRA-ARTICULAR; INTRALESIONAL; INTRAMUSCULAR; INTRAVENOUS; SOFT TISSUE at 01:01

## 2022-01-27 RX ADMIN — Medication 500 UNITS: at 02:01

## 2022-01-27 RX ADMIN — Medication 10 ML: at 02:01

## 2022-01-27 RX ADMIN — GEMCITABINE 1600 MG: 38 INJECTION, SOLUTION INTRAVENOUS at 01:01

## 2022-01-27 RX ADMIN — SODIUM CHLORIDE: 0.9 INJECTION, SOLUTION INTRAVENOUS at 01:01

## 2022-01-27 NOTE — PLAN OF CARE
Pt tolerated Gemzar infusion well.  No s/s of infusion reaction noted.  Instructed to call MD with any problerms.

## 2022-01-27 NOTE — PROGRESS NOTES
History of present illness:  The patient is a 76-year-old white female recently discovered to have pleural mesothelioma.  Patient began experiencing difficulties with shortness of breath last fall.  She was eventually discovered to have pleural effusion and had a drain.  However recurred.  Patient eventually underwent pleural biopsy which returned positive for malignant mesothelioma.  She also had PleurX catheter placed to assist with periodic drainage of pleural fluid.  She drains every 2 days and obtains anywhere from 150-200 cc per drain.  Patient has some difficulties with thoracic discomfort and has hydrocodone 7.5-325 on hand.  She takes this only at night.  She denies weight loss, productive cough, hemoptysis, or worsening dyspnea.  She has been seen and evaluated by Dr. Novak and is scheduled to undergo staging laparoscopy to determine whether not she has spread of disease across the diaphragm into the abdomen.  Consult has been requested for assistance with use of neoadjuvant or palliative chemotherapy.     Since initial office consultation, patient is undergone exploratory laparotomy.  She was found not to have any diaphragmatic involvement or intra-abdominal disease in association with her diagnosis of malignant mesothelioma.  She returns to clinic to review interval studies in preparation for neoadjuvant chemotherapy.  MediPort catheter has been placed within the patient's right chest.     Patient returns to clinic for re-evaluation following 4 cycles of cisplatin/Alimta.  Review of imaging and discussions in tumor board following completion of neoadjuvant therapy were for maintenance Alimta as opposed to surgical intervention.  However, patient's performance status was too poor to allow for such and she was observed until performance status would allow for therapy.  She has completed 1 cycle of maintenance Alimta and returns to clinic for toxicity assessment part way through her cycle.  Patient  reports increasing fatigue and disequilibrium.  She also has intermittent, evening, postprandial nausea emesis.  She is experiencing worsening dyspnea on exertion.  Pleural catheter drainage is nil.    Patient returns to clinic to review interval lab prior to initiation of cycle 4 of Keytruda.  Patient, unfortunately, failed therapy with Keytruda and has been transitioned to single agent Gemzar.  She returns to clinic for evaluation prior to cycle 3, day 1 of therapy.  Patient tolerated last infusion without difficulty.  She denies mouth sores, had emesis, diarrhea rash, pain, worsening dyspnea.  Patient has suffered a fall approximately 2 weeks ago.  She has had chronic difficulties with left hip pain since that time.  In order to improve her stability, she has begun ambulating with a cane.  Patient did not seek evaluation of her hip after this event and has not received x-rays to date.      Physical examination:  Well-developed, well-nourished, elderly, white female, no acute distress, who has a weight of 121.5 lb (stable).  VITAL SIGNS: Documented  and reviewed this visit.  HEENT: Normocephalic, atraumatic. Oral mucosa pink and moist. Lips without lesions. Tongue midline. Oropharynx clear. Nonicteric sclerae.   NECK: Supple, no adenopathy. No carotid bruits, thyromegaly or thyroid nodule.   HEART: Regular rate and rhythm without murmur, gallop or rub.   LUNGS:  Absent breath sounds over the right hemithorax.  Otherwise, clear to auscultation with normal respiratory effort.    ABDOMEN: Soft, nontender, nondistended with positive normoactive bowel sounds, no hepatosplenomegaly.   EXTREMITIES: No cyanosis, clubbing or edema. Distal pulses are intact.   AXILLAE AND GROIN: No palpable pathologic lymphadenopathy is appreciated.   SKIN: Intact/turgor normal.  MediPort catheter present within the right chest.  Surgical wound is well approximated and healed.  It is free from signs of infection.    NEUROLOGIC: Cranial  nerves II-XII grossly intact. Motor: Good muscle bulk and tone. Strength/sensory 5/5 throughout. Gait unstable/ambulating with the assistance of a cane.     Laboratory:  White count 16.5, hemoglobin 10.9, hematocrit 34, platelets 401, absolute neutrophil count is 12,400.  Sodium 139, potassium 4.7, chloride 105, CO2 23, BUN 26, creatinine 1.2, glucose 147, calcium 9.7, magnesium 2.1, alkaline phosphatase 282, liver function test within normal limits, , GFR is 44.     Guardant 360 panel:  Remarkable for TP53 mutation and an EGFR mutation of uncertain clinical significance.  Tumor was not MSI high.    Impression:  1. Malignant pleural mesothelioma with evidence of disease progression on most recent imaging.    2. Therapy associated fatigue malaise.  3. Depression/anxiety around issues of health.    4. Esophagitis - S/P dilatation and biopsy.  5. Mild chemotherapy associated anemia not in need intervention.  6. Stage 3 chronic kidney disease.  7. Recent fall/chronic left hip pain following.    Plan:  1.  Proceed with cycle 3, day 1 of therapy.  2. Return to clinic in 1 week with interval CBC.   3. Obtain plain films of the patient's left hip.     This note was created using voice recognition software and may contain grammatical errors.

## 2022-01-28 ENCOUNTER — TELEPHONE (OUTPATIENT)
Dept: HEMATOLOGY/ONCOLOGY | Facility: CLINIC | Age: 77
End: 2022-01-28
Payer: MEDICARE

## 2022-01-28 RX ORDER — HYDROCODONE BITARTRATE AND ACETAMINOPHEN 5; 325 MG/1; MG/1
1 TABLET ORAL EVERY 6 HOURS PRN
Qty: 20 TABLET | Refills: 0 | Status: SHIPPED | OUTPATIENT
Start: 2022-01-28 | End: 2022-04-26 | Stop reason: SDUPTHER

## 2022-01-28 NOTE — TELEPHONE ENCOUNTER
LVM with [facility] is radiology to inquire, awaiting call back     ----- Message from Presley Mcdaniel MD sent at 1/28/2022  7:11 AM CST -----  It doesn't look as if the dictation was successfully completed; can we get the report corrected?

## 2022-01-28 NOTE — TELEPHONE ENCOUNTER
Report not complete, LVM with radiology, will call patient back after MD reviews the completed report     ----- Message from Jania Gracia sent at 1/28/2022 12:39 PM CST -----  Test Results    Who Called: PT     Name of Test (Lab/Mammo/Etc): XR    Date of Test: 1/27    Ordering Provider: Jonas    Where the test was performed:[facility]    Best Call Back Number: [phone]

## 2022-01-31 ENCOUNTER — TELEPHONE (OUTPATIENT)
Dept: HEMATOLOGY/ONCOLOGY | Facility: CLINIC | Age: 77
End: 2022-01-31
Payer: MEDICARE

## 2022-02-02 ENCOUNTER — PATIENT MESSAGE (OUTPATIENT)
Dept: HEMATOLOGY/ONCOLOGY | Facility: CLINIC | Age: 77
End: 2022-02-02

## 2022-02-02 ENCOUNTER — INFUSION (OUTPATIENT)
Dept: INFUSION THERAPY | Facility: HOSPITAL | Age: 77
End: 2022-02-02
Attending: INTERNAL MEDICINE
Payer: MEDICARE

## 2022-02-02 ENCOUNTER — TELEPHONE (OUTPATIENT)
Dept: HEMATOLOGY/ONCOLOGY | Facility: CLINIC | Age: 77
End: 2022-02-02
Payer: MEDICARE

## 2022-02-02 ENCOUNTER — OFFICE VISIT (OUTPATIENT)
Dept: HEMATOLOGY/ONCOLOGY | Facility: CLINIC | Age: 77
End: 2022-02-02
Payer: MEDICARE

## 2022-02-02 VITALS
HEIGHT: 63 IN | TEMPERATURE: 98 F | OXYGEN SATURATION: 96 % | WEIGHT: 121.69 LBS | RESPIRATION RATE: 18 BRPM | HEART RATE: 101 BPM | SYSTOLIC BLOOD PRESSURE: 144 MMHG | DIASTOLIC BLOOD PRESSURE: 72 MMHG | BODY MASS INDEX: 21.56 KG/M2

## 2022-02-02 VITALS
BODY MASS INDEX: 21.56 KG/M2 | RESPIRATION RATE: 17 BRPM | OXYGEN SATURATION: 96 % | DIASTOLIC BLOOD PRESSURE: 64 MMHG | WEIGHT: 121.69 LBS | HEIGHT: 63 IN | SYSTOLIC BLOOD PRESSURE: 132 MMHG | TEMPERATURE: 98 F | HEART RATE: 99 BPM

## 2022-02-02 DIAGNOSIS — C45.0 MALIGNANT MESOTHELIOMA OF PLEURA: Primary | ICD-10-CM

## 2022-02-02 DIAGNOSIS — T45.1X5A ANTINEOPLASTIC CHEMOTHERAPY INDUCED ANEMIA: ICD-10-CM

## 2022-02-02 DIAGNOSIS — D64.81 ANTINEOPLASTIC CHEMOTHERAPY INDUCED ANEMIA: ICD-10-CM

## 2022-02-02 PROCEDURE — 99999 PR PBB SHADOW E&M-EST. PATIENT-LVL V: CPT | Mod: PBBFAC,,,

## 2022-02-02 PROCEDURE — 3288F FALL RISK ASSESSMENT DOCD: CPT | Mod: CPTII,S$GLB,,

## 2022-02-02 PROCEDURE — 96375 TX/PRO/DX INJ NEW DRUG ADDON: CPT | Mod: PN

## 2022-02-02 PROCEDURE — 63600175 PHARM REV CODE 636 W HCPCS: Mod: PN

## 2022-02-02 PROCEDURE — 96413 CHEMO IV INFUSION 1 HR: CPT | Mod: PN

## 2022-02-02 PROCEDURE — 25000003 PHARM REV CODE 250: Mod: PN

## 2022-02-02 PROCEDURE — 3288F PR FALLS RISK ASSESSMENT DOCUMENTED: ICD-10-PCS | Mod: CPTII,S$GLB,,

## 2022-02-02 PROCEDURE — 99214 OFFICE O/P EST MOD 30 MIN: CPT | Mod: S$GLB,,,

## 2022-02-02 PROCEDURE — 3077F PR MOST RECENT SYSTOLIC BLOOD PRESSURE >= 140 MM HG: ICD-10-PCS | Mod: CPTII,S$GLB,,

## 2022-02-02 PROCEDURE — 3077F SYST BP >= 140 MM HG: CPT | Mod: CPTII,S$GLB,,

## 2022-02-02 PROCEDURE — A4216 STERILE WATER/SALINE, 10 ML: HCPCS | Mod: PN

## 2022-02-02 PROCEDURE — 99999 PR PBB SHADOW E&M-EST. PATIENT-LVL V: ICD-10-PCS | Mod: PBBFAC,,,

## 2022-02-02 PROCEDURE — 1100F PR PT FALLS ASSESS DOC 2+ FALLS/FALL W/INJURY/YR: ICD-10-PCS | Mod: CPTII,S$GLB,,

## 2022-02-02 PROCEDURE — 3078F PR MOST RECENT DIASTOLIC BLOOD PRESSURE < 80 MM HG: ICD-10-PCS | Mod: CPTII,S$GLB,,

## 2022-02-02 PROCEDURE — 1125F AMNT PAIN NOTED PAIN PRSNT: CPT | Mod: CPTII,S$GLB,,

## 2022-02-02 PROCEDURE — 1159F MED LIST DOCD IN RCRD: CPT | Mod: CPTII,S$GLB,,

## 2022-02-02 PROCEDURE — 3078F DIAST BP <80 MM HG: CPT | Mod: CPTII,S$GLB,,

## 2022-02-02 PROCEDURE — 1100F PTFALLS ASSESS-DOCD GE2>/YR: CPT | Mod: CPTII,S$GLB,,

## 2022-02-02 PROCEDURE — 1159F PR MEDICATION LIST DOCUMENTED IN MEDICAL RECORD: ICD-10-PCS | Mod: CPTII,S$GLB,,

## 2022-02-02 PROCEDURE — 99214 PR OFFICE/OUTPT VISIT, EST, LEVL IV, 30-39 MIN: ICD-10-PCS | Mod: S$GLB,,,

## 2022-02-02 PROCEDURE — 1125F PR PAIN SEVERITY QUANTIFIED, PAIN PRESENT: ICD-10-PCS | Mod: CPTII,S$GLB,,

## 2022-02-02 RX ORDER — HEPARIN 100 UNIT/ML
500 SYRINGE INTRAVENOUS
Status: CANCELLED | OUTPATIENT
Start: 2022-02-02

## 2022-02-02 RX ORDER — SODIUM CHLORIDE 0.9 % (FLUSH) 0.9 %
10 SYRINGE (ML) INJECTION
Status: DISCONTINUED | OUTPATIENT
Start: 2022-02-02 | End: 2022-02-02 | Stop reason: HOSPADM

## 2022-02-02 RX ORDER — DEXAMETHASONE SODIUM PHOSPHATE 4 MG/ML
4 INJECTION, SOLUTION INTRA-ARTICULAR; INTRALESIONAL; INTRAMUSCULAR; INTRAVENOUS; SOFT TISSUE
Status: CANCELLED
Start: 2022-02-02

## 2022-02-02 RX ORDER — HEPARIN 100 UNIT/ML
500 SYRINGE INTRAVENOUS
Status: DISCONTINUED | OUTPATIENT
Start: 2022-02-02 | End: 2022-02-02 | Stop reason: HOSPADM

## 2022-02-02 RX ORDER — DEXAMETHASONE SODIUM PHOSPHATE 4 MG/ML
4 INJECTION, SOLUTION INTRA-ARTICULAR; INTRALESIONAL; INTRAMUSCULAR; INTRAVENOUS; SOFT TISSUE
Status: COMPLETED | OUTPATIENT
Start: 2022-02-02 | End: 2022-02-02

## 2022-02-02 RX ORDER — SODIUM CHLORIDE 0.9 % (FLUSH) 0.9 %
10 SYRINGE (ML) INJECTION
Status: CANCELLED | OUTPATIENT
Start: 2022-02-02

## 2022-02-02 RX ADMIN — SODIUM CHLORIDE: 0.9 INJECTION, SOLUTION INTRAVENOUS at 12:02

## 2022-02-02 RX ADMIN — Medication 500 UNITS: at 01:02

## 2022-02-02 RX ADMIN — Medication 10 ML: at 01:02

## 2022-02-02 RX ADMIN — GEMCITABINE 1600 MG: 38 INJECTION, SOLUTION INTRAVENOUS at 12:02

## 2022-02-02 RX ADMIN — DEXAMETHASONE SODIUM PHOSPHATE 4 MG: 4 INJECTION, SOLUTION INTRA-ARTICULAR; INTRALESIONAL; INTRAMUSCULAR; INTRAVENOUS; SOFT TISSUE at 12:02

## 2022-02-02 NOTE — TELEPHONE ENCOUNTER
Reached out to xray staff, patients hip xray report is incomplete. Staff states that they will reach out to radiologist to reread

## 2022-02-02 NOTE — PROGRESS NOTES
Subjective:     Name: Yoanna Thapa  : 1945  MRN: [id number]    Chief Complaint: Follow-up, treatment     HPI:   Yoanna Thapa is a 76 y.o. female known to Dr. Mcdaniel who presents for evaluation of pleural mesothelioma and consideration of C3 D8 of Gemcitabine. Since her last visit she endorses some fatigue, feels she is tolerating Gemzar well. Appetite has been fair; weight stable at 121 lbs. She did have a fall prior to her last visit with Dr. Mcdaniel, results from x-ray left hip show degenerative changes only; no fracture. Still has some tenderness in her hip and near her coccyx. Still ambulating with a cane.     Denies fevers, chills, rigors. Denies CP, palpitations, SOB, productive cough, abdominal pain, changes in bowel habits, urinary discomfort or frequency changes. No new lumps, bumps or swelling. Denies bruising or bleeding.     Prior History:   In the Fall of  she began experiencing difficulties with breathing and SOB. Was found to have pleural effusion. 2021 effusion recurred and she underwent a pleural biopsy which revealed malignant mesothelioma.   2021 Pleurx catheter was placed  2021 Staging laparotomy done. No diaphragmatic involvement or intra-abdominal disease in association with her diagnosis of malignant mesothelioma.   03/10/2021 to 2021- Neoadjuvant Cisplatin/Alimta .Performance status too poor to allow for surgical intervention.  Had once cycle of maintenance Alimta; tolerated poorly.  21 to 11/10/21- Keytruda; PET on 21 showed progressive disease  21- Began single agent Gemzar     Past Medical History:   Diagnosis Date    Anxiety     Breast cancer     Right: Lumpectomy and XRT    Cancer 2009    breast    Cancer of pleura, secondary 2021    Depression     Disorder of kidney and ureter     GERD (gastroesophageal reflux disease)     Hyperlipidemia     Hypertension     Malignant pleural effusion 2021     Symptomatic anemia 6/15/2021       Past Surgical History:   Procedure Laterality Date    APPENDECTOMY      BACK SURGERY      BREAST LUMPECTOMY Right     COSMETIC SURGERY      DIAGNOSTIC LAPAROSCOPY N/A 2/18/2021    Procedure: LAPAROSCOPY, DIAGNOSTIC;  Surgeon: Aravind Novak MD;  Location: [facility] OR 2ND FLR;  Service: Colon and Rectal;  Laterality: N/A;    ESOPHAGEAL DILATION N/A 5/20/2021    Procedure: DILATION, ESOPHAGUS;  Surgeon: HO Machuca MD;  Location: [facility] ENDO;  Service: Endoscopy;  Laterality: N/A;    ESOPHAGOGASTRODUODENOSCOPY N/A 5/20/2021    Procedure: EGD (ESOPHAGOGASTRODUODENOSCOPY);  Surgeon: HO Machuca MD;  Location: [facility] ENDO;  Service: Endoscopy;  Laterality: N/A;    HYSTERECTOMY      INSERTION OF SUBCUTANEOUS PORT      INSERTION OF TUNNELED CENTRAL VENOUS CATHETER (CVC) WITH SUBCUTANEOUS PORT  2/18/2021    Procedure: INSERTION, PORT-A-CATH;  Surgeon: Aravind Novak MD;  Location: [facility] OR [facility] FLR;  Service: Colon and Rectal;;    SPINE SURGERY      THORACENTESIS Right 12/28/2020    [facility]    THORACENTESIS Right 01/24/2021    [facility]    TUBE THORACOTOMY Right 2/1/2021    Procedure: INSERTION, CATHETER, INTERCOSTAL, FOR DRAINAGE;  Surgeon: Esdras Jarrett MD;  Location: [facility] OR;  Service: Cardiothoracic;  Laterality: Right;    VIDEO-ASSISTED THORACOSCOPIC SURGERY (VATS) Right 1/27/2021    Procedure: VATS (VIDEO-ASSISTED THORACOSCOPIC SURGERY) - Pleural biopsy ;  Surgeon: Esdras Jarrett MD;  Location: [facility] OR;  Service: Cardiovascular;  Laterality: Right;           Review of patient's allergies indicates:   Allergen Reactions    Iodine and iodide containing products Anaphylaxis and Swelling     Dye causes Blisters.  Pt tolerates shrimp and betadine.    Sulfa (sulfonamide antibiotics) Anaphylaxis and Hives    Adhesive Other (See Comments)     Blister    Blisters   Pt request paper tape.    Iodinated contrast media Blisters       Review of Systems    Constitutional: Negative for chills, decreased appetite, diaphoresis, fever, malaise/fatigue and night sweats.   HENT: Negative for congestion and sore throat.    Eyes: Negative for visual disturbance.   Cardiovascular: Negative for chest pain, dyspnea on exertion, leg swelling and palpitations.   Respiratory: Positive for wheezing (at night). Negative for cough and shortness of breath.    Hematologic/Lymphatic: Negative for adenopathy and bleeding problem. Does not bruise/bleed easily.   Skin: Negative for dry skin, itching and rash.   Musculoskeletal: Negative for joint pain, joint swelling and myalgias.   Gastrointestinal: Negative for abdominal pain, constipation, diarrhea, heartburn, hematochezia, nausea and vomiting.   Genitourinary: Negative for dysuria, frequency, hematuria and urgency.   Neurological: Negative for dizziness, headaches, numbness, paresthesias and weakness.       Objective:     There were no vitals filed for this visit.     Physical Exam  Vitals reviewed.   Constitutional:       General: She is not in acute distress.     Appearance: She is not diaphoretic.   HENT:      Head: Normocephalic and atraumatic.   Cardiovascular:      Rate and Rhythm: Normal rate and regular rhythm.      Heart sounds: No murmur heard.  No friction rub. No gallop.    Pulmonary:      Effort: Pulmonary effort is normal. No respiratory distress (RLL).      Breath sounds: Wheezing present. No rhonchi or rales.   Abdominal:      General: Bowel sounds are normal.      Palpations: There is no mass.      Tenderness: There is no abdominal tenderness. There is no guarding.   Musculoskeletal:         General: No swelling.      Cervical back: No tenderness.      Right lower leg: No edema.      Left lower leg: No edema.   Lymphadenopathy:      Cervical: No cervical adenopathy.   Skin:     Findings: No bruising, erythema or rash.   Neurological:      Mental Status: She is alert and oriented to person, place, and time.    Psychiatric:         Behavior: Behavior normal.         Current Outpatient Medications on File Prior to Visit   Medication Sig    acetaminophen (TYLENOL) 500 MG tablet Take 1,000 mg by mouth every 6 (six) hours as needed for Pain.    albuterol (PROVENTIL/VENTOLIN HFA) 90 mcg/actuation inhaler Inhale 2 puffs into the lungs every 6 (six) hours as needed for Wheezing. Rescue    ALPRAZolam (XANAX) 0.25 MG tablet Take 1 tablet (0.25 mg total) by mouth 3 (three) times daily as needed for Anxiety.    amLODIPine (NORVASC) 5 MG tablet Take 1 tablet (5 mg total) by mouth once daily.    ascorbic acid, vitamin C, (VITAMIN C) 500 MG tablet Take 500 mg by mouth every morning.    atorvastatin (LIPITOR) 10 MG tablet Take 10 mg by mouth every morning.     cyclobenzaprine (FLEXERIL) 10 MG tablet Take 10 mg by mouth daily as needed.     docusate sodium (COLACE) 100 MG capsule Take 100 mg by mouth 2 (two) times daily as needed for Constipation.    folic acid (FOLVITE) 1 MG tablet Take 1 tablet (1 mg total) by mouth once daily.    gabapentin (NEURONTIN) 300 MG capsule Take 1 capsule (300 mg total) by mouth 2 (two) times daily.    HYDROcodone-acetaminophen (NORCO) 5-325 mg per tablet Take 1 tablet by mouth every 6 (six) hours as needed for Pain.    LIDOcaine-prilocaine (EMLA) cream Apply topically as needed.    loratadine (CLARITIN) 10 mg tablet Take 1 tablet by mouth once daily.    losartan (COZAAR) 100 MG tablet Take 100 mg by mouth every morning.     megestroL (MEGACE ES) 625 mg/5 mL (125 mg/mL) Susp Take 5 mLs (625 mg total) by mouth once daily.    megestroL (MEGACE) 400 mg/10 mL (40 mg/mL) Susp Take 200 mg by mouth once daily.    multivitamin capsule Take 1 capsule by mouth once daily.    ondansetron (ZOFRAN-ODT) 4 MG TbDL 4 mg every 6 (six) hours.    pantoprazole (PROTONIX) 40 MG tablet Take 40 mg by mouth every morning.     paroxetine (PAXIL) 20 MG tablet Take 1 tablet by mouth every morning.    potassium  chloride (KLOR-CON) 10 MEQ TbSR TAKE 1 TABLET (10 MEQ) BY MOUTH 1 TIME A DAY    potassium chloride SA (K-DUR,KLOR-CON) 20 MEQ tablet Take 1 tablet (20 mEq total) by mouth 2 (two) times daily. (Patient taking differently: Take 20 mEq by mouth once daily.)    prochlorperazine (COMPAZINE) 10 MG tablet Take 1 tablet (10 mg total) by mouth every 6 (six) hours as needed (nausea).    triamcinolone acetonide 0.025% (KENALOG) 0.025 % cream Apply topically 2 (two) times daily.    triamcinolone acetonide 0.025% (KENALOG) 0.025 % Oint Apply topically 2 (two) times daily. Apply to affected area twice daily    venlafaxine (EFFEXOR XR) 75 MG 24 hr capsule Take 2 capsules (150 mg total) by mouth once daily.    water Liqd 150 mL with MILK OF MAGNESIA 400 mg/5 mL Susp 400 mg, diphenhydrAMINE 12.5 mg/5 mL Elix 60 mg, nystatin 100,000 unit/mL Susp 500,000 Units SWISH & SWALLOW 5 ML'S EVERY 4 HOURS AS NEEDED FOR MOUTH & THROAT PAIN     Current Facility-Administered Medications on File Prior to Visit   Medication    ceFAZolin injection 2 g       CBC:  Lab Results   Component Value Date    WBC 6.22 02/02/2022    HGB 9.3 (L) 02/02/2022    HCT 28.8 (L) 02/02/2022     (H) 02/02/2022     02/02/2022         CMP:  Sodium   Date Value Ref Range Status   01/27/2022 139 136 - 145 mmol/L Final     Potassium   Date Value Ref Range Status   01/27/2022 4.7 3.5 - 5.1 mmol/L Final     Chloride   Date Value Ref Range Status   01/27/2022 105 95 - 110 mmol/L Final     CO2   Date Value Ref Range Status   01/27/2022 23 23 - 29 mmol/L Final     Glucose   Date Value Ref Range Status   01/27/2022 147 (H) 70 - 110 mg/dL Final     BUN   Date Value Ref Range Status   01/27/2022 26 (H) 8 - 23 mg/dL Final     Creatinine   Date Value Ref Range Status   01/27/2022 1.2 0.5 - 1.4 mg/dL Final     Calcium   Date Value Ref Range Status   01/27/2022 9.7 8.7 - 10.5 mg/dL Final     Total Protein   Date Value Ref Range Status   01/27/2022 8.2 6.0 - 8.4  g/dL Final     Albumin   Date Value Ref Range Status   01/27/2022 3.5 3.5 - 5.2 g/dL Final     Total Bilirubin   Date Value Ref Range Status   01/27/2022 0.3 0.1 - 1.0 mg/dL Final     Comment:     For infants and newborns, interpretation of results should be based  on gestational age, weight and in agreement with clinical  observations.    Premature Infant recommended reference ranges:  Up to 24 hours.............<8.0 mg/dL  Up to 48 hours............<12.0 mg/dL  3-5 days..................<15.0 mg/dL  6-29 days.................<15.0 mg/dL       Alkaline Phosphatase   Date Value Ref Range Status   01/27/2022 282 (H) 55 - 135 U/L Final     AST   Date Value Ref Range Status   01/27/2022 23 10 - 40 U/L Final     ALT   Date Value Ref Range Status   01/27/2022 34 10 - 44 U/L Final     Anion Gap   Date Value Ref Range Status   01/27/2022 11 8 - 16 mmol/L Final     eGFR if    Date Value Ref Range Status   01/27/2022 51 (A) >60 mL/min/1.73 m^2 Final     eGFR if non    Date Value Ref Range Status   01/27/2022 44 (A) >60 mL/min/1.73 m^2 Final     Comment:     Calculation used to obtain the estimated glomerular filtration  rate (eGFR) is the CKD-EPI equation.          Assessment:       No diagnosis found.     Plan:   Malignant mesothelioma of pleura  -Proceed with Gemzar C3 D8 today as planned   -Udenyca D9 as planned  -Follow up in 2 weeks for next cycle and labs     Anemia   -Chemotherapy induced anemia  -Last received Retacrit 09/2021; have been holding for Hgb >11   -Hbg today 9.3 g/dL  -Will hold off on initiating Retacrit today; she would like to wait until 02/17 follow-up with Dr. Mcdaniel to see if her counts recover on their own and to discuss with him.     Follow-up:  Follow-up with Dr. Mcdaniel in two weeks (02/17) for consideration of Cycle 4 Gemzar and to discuss possibly starting Retacrit. CBC, CMP, LDH, Magnesium level prior to visit.       Patient queried and all questions were  answered.  Assessment/Plan reviewed and approved by Dr. Moore

## 2022-02-02 NOTE — Clinical Note
Proceed with Gemzar c3D8 today Follow up with Dr. Mcdaniel for planned C4 of Gemzar on 02/17/22 with CBC, CMP, LDH and Mg prior to the visit.

## 2022-02-02 NOTE — PLAN OF CARE
"  Problem: Adult Inpatient Plan of Care  Goal: Patient-Specific Goal (Individualized)  Outcome: Ongoing, Progressing  Flowsheets (Taken 2/2/2022 1203)  Anxieties, Fears or Concerns: none expressed  Individualized Care Needs: recliner, blanket, snacks  Patient-Specific Goals (Include Timeframe): no s/s infeciton during txt     Problem: Fatigue  Goal: Improved Activity Tolerance  Intervention: Promote Improved Energy  Flowsheets (Taken 2/2/2022 1203)  Fatigue Management: frequent rest breaks encouraged  Sleep/Rest Enhancement:   noise level reduced   natural light exposure provided  Activity Management:   Ambulated -L4   Ambulated in alcantar - L4    BP (!) 144/72 (Patient Position: Sitting)   Pulse 101   Temp 97.8 °F (36.6 °C)   Resp 18   Ht 5' 3" (1.6 m)   Wt 55.2 kg (121 lb 11.1 oz)   SpO2 96%   BMI 21.56 kg/m²   Patient here today for C3D8. VSS. Port accessed to right chest without difficulty; patent with blood return.   Orders released. Pt oriented to unit. Symptoms reviewed. Questions and concerns addressed.     "

## 2022-02-02 NOTE — PLAN OF CARE
"  Problem: Adult Inpatient Plan of Care  Goal: Plan of Care Review  Outcome: Ongoing, Progressing  Flowsheets (Taken 2/2/2022 1344)  Plan of Care Reviewed With:   patient   family     /64   Pulse 99   Temp 97.8 °F (36.6 °C)   Resp 17   Ht 5' 3" (1.6 m)   Wt 55.2 kg (121 lb 11.1 oz)   SpO2 96%   BMI 21.56 kg/m²   Patient tolerated treatment well. Port flushed with blood return, heparin locked, and de-accessed. AVS provided and reviewed. Ambulates per self/cane.     "

## 2022-02-03 ENCOUNTER — INFUSION (OUTPATIENT)
Dept: INFUSION THERAPY | Facility: HOSPITAL | Age: 77
End: 2022-02-03
Attending: INTERNAL MEDICINE
Payer: MEDICARE

## 2022-02-03 ENCOUNTER — DOCUMENT SCAN (OUTPATIENT)
Dept: HOME HEALTH SERVICES | Facility: HOSPITAL | Age: 77
End: 2022-02-03
Payer: MEDICARE

## 2022-02-03 VITALS
HEIGHT: 65 IN | WEIGHT: 121.69 LBS | BODY MASS INDEX: 20.28 KG/M2 | RESPIRATION RATE: 17 BRPM | DIASTOLIC BLOOD PRESSURE: 69 MMHG | HEART RATE: 99 BPM | SYSTOLIC BLOOD PRESSURE: 123 MMHG | TEMPERATURE: 98 F

## 2022-02-03 DIAGNOSIS — C45.0 MALIGNANT MESOTHELIOMA OF PLEURA: Primary | ICD-10-CM

## 2022-02-03 PROCEDURE — 96372 THER/PROPH/DIAG INJ SC/IM: CPT | Mod: PN

## 2022-02-03 PROCEDURE — 63600175 PHARM REV CODE 636 W HCPCS: Mod: TB,PN

## 2022-02-03 RX ADMIN — PEGFILGRASTIM-CBQV 6 MG: 6 INJECTION, SOLUTION SUBCUTANEOUS at 11:02

## 2022-02-03 NOTE — PLAN OF CARE
Problem: Adult Inpatient Plan of Care  Goal: Patient-Specific Goal (Individualized)  Outcome: Ongoing, Progressing  Flowsheets (Taken 2/3/2022 1140)  Anxieties, Fears or Concerns: None  Individualized Care Needs: None  Patient-Specific Goals (Include Timeframe): Infection prevention during treatment.     Problem: Fatigue  Goal: Improved Activity Tolerance  Intervention: Promote Improved Energy  Flowsheets (Taken 2/3/2022 1140)  Fatigue Management:   activity schedule adjusted   frequent rest breaks encouraged   paced activity encouraged   fatigue-related activity identified  Sleep/Rest Enhancement:   regular sleep/rest pattern promoted   relaxation techniques promoted  Activity Management:   Ambulated -L4   Ambulated in alcantar - L4     Problem: Adult Inpatient Plan of Care  Goal: Plan of Care Review  Outcome: Ongoing, Progressing  Flowsheets (Taken 2/3/2022 1140)  Plan of Care Reviewed With: patient  Tolerated injection well.  Ambulated from infusion center with steady gait.

## 2022-02-17 ENCOUNTER — TELEPHONE (OUTPATIENT)
Dept: HEMATOLOGY/ONCOLOGY | Facility: CLINIC | Age: 77
End: 2022-02-17

## 2022-02-17 ENCOUNTER — OFFICE VISIT (OUTPATIENT)
Dept: HEMATOLOGY/ONCOLOGY | Facility: CLINIC | Age: 77
End: 2022-02-17
Payer: MEDICARE

## 2022-02-17 ENCOUNTER — INFUSION (OUTPATIENT)
Dept: INFUSION THERAPY | Facility: HOSPITAL | Age: 77
End: 2022-02-17
Attending: INTERNAL MEDICINE
Payer: MEDICARE

## 2022-02-17 VITALS
BODY MASS INDEX: 20.09 KG/M2 | DIASTOLIC BLOOD PRESSURE: 71 MMHG | OXYGEN SATURATION: 99 % | SYSTOLIC BLOOD PRESSURE: 122 MMHG | RESPIRATION RATE: 16 BRPM | HEART RATE: 99 BPM | WEIGHT: 120.56 LBS | TEMPERATURE: 97 F | HEIGHT: 65 IN

## 2022-02-17 VITALS — RESPIRATION RATE: 18 BRPM | SYSTOLIC BLOOD PRESSURE: 120 MMHG | DIASTOLIC BLOOD PRESSURE: 73 MMHG | HEART RATE: 96 BPM

## 2022-02-17 DIAGNOSIS — R53.83 FATIGUE, UNSPECIFIED TYPE: ICD-10-CM

## 2022-02-17 DIAGNOSIS — C45.0 MALIGNANT MESOTHELIOMA OF PLEURA: Primary | ICD-10-CM

## 2022-02-17 DIAGNOSIS — D64.81 ANTINEOPLASTIC CHEMOTHERAPY INDUCED ANEMIA: ICD-10-CM

## 2022-02-17 DIAGNOSIS — T45.1X5A ANTINEOPLASTIC CHEMOTHERAPY INDUCED ANEMIA: ICD-10-CM

## 2022-02-17 DIAGNOSIS — N18.30 STAGE 3 CHRONIC KIDNEY DISEASE, UNSPECIFIED WHETHER STAGE 3A OR 3B CKD: ICD-10-CM

## 2022-02-17 DIAGNOSIS — F32.0 CURRENT MILD EPISODE OF MAJOR DEPRESSIVE DISORDER WITHOUT PRIOR EPISODE: ICD-10-CM

## 2022-02-17 DIAGNOSIS — K20.90 ESOPHAGITIS: ICD-10-CM

## 2022-02-17 DIAGNOSIS — G45.9 TRANSIENT CEREBRAL ISCHEMIA, UNSPECIFIED TYPE: ICD-10-CM

## 2022-02-17 PROCEDURE — 3288F PR FALLS RISK ASSESSMENT DOCUMENTED: ICD-10-PCS | Mod: CPTII,S$GLB,, | Performed by: INTERNAL MEDICINE

## 2022-02-17 PROCEDURE — 3074F SYST BP LT 130 MM HG: CPT | Mod: CPTII,S$GLB,, | Performed by: INTERNAL MEDICINE

## 2022-02-17 PROCEDURE — A4216 STERILE WATER/SALINE, 10 ML: HCPCS | Mod: PN | Performed by: INTERNAL MEDICINE

## 2022-02-17 PROCEDURE — 25000003 PHARM REV CODE 250: Mod: PN | Performed by: INTERNAL MEDICINE

## 2022-02-17 PROCEDURE — 1126F AMNT PAIN NOTED NONE PRSNT: CPT | Mod: CPTII,S$GLB,, | Performed by: INTERNAL MEDICINE

## 2022-02-17 PROCEDURE — 99214 OFFICE O/P EST MOD 30 MIN: CPT | Mod: S$GLB,,, | Performed by: INTERNAL MEDICINE

## 2022-02-17 PROCEDURE — 1101F PR PT FALLS ASSESS DOC 0-1 FALLS W/OUT INJ PAST YR: ICD-10-PCS | Mod: CPTII,S$GLB,, | Performed by: INTERNAL MEDICINE

## 2022-02-17 PROCEDURE — 1101F PT FALLS ASSESS-DOCD LE1/YR: CPT | Mod: CPTII,S$GLB,, | Performed by: INTERNAL MEDICINE

## 2022-02-17 PROCEDURE — 3288F FALL RISK ASSESSMENT DOCD: CPT | Mod: CPTII,S$GLB,, | Performed by: INTERNAL MEDICINE

## 2022-02-17 PROCEDURE — 1159F MED LIST DOCD IN RCRD: CPT | Mod: CPTII,S$GLB,, | Performed by: INTERNAL MEDICINE

## 2022-02-17 PROCEDURE — 1160F PR REVIEW ALL MEDS BY PRESCRIBER/CLIN PHARMACIST DOCUMENTED: ICD-10-PCS | Mod: CPTII,S$GLB,, | Performed by: INTERNAL MEDICINE

## 2022-02-17 PROCEDURE — 3078F DIAST BP <80 MM HG: CPT | Mod: CPTII,S$GLB,, | Performed by: INTERNAL MEDICINE

## 2022-02-17 PROCEDURE — 3074F PR MOST RECENT SYSTOLIC BLOOD PRESSURE < 130 MM HG: ICD-10-PCS | Mod: CPTII,S$GLB,, | Performed by: INTERNAL MEDICINE

## 2022-02-17 PROCEDURE — 96413 CHEMO IV INFUSION 1 HR: CPT | Mod: PN

## 2022-02-17 PROCEDURE — 3078F PR MOST RECENT DIASTOLIC BLOOD PRESSURE < 80 MM HG: ICD-10-PCS | Mod: CPTII,S$GLB,, | Performed by: INTERNAL MEDICINE

## 2022-02-17 PROCEDURE — 99999 PR PBB SHADOW E&M-EST. PATIENT-LVL V: CPT | Mod: PBBFAC,,, | Performed by: INTERNAL MEDICINE

## 2022-02-17 PROCEDURE — 1126F PR PAIN SEVERITY QUANTIFIED, NO PAIN PRESENT: ICD-10-PCS | Mod: CPTII,S$GLB,, | Performed by: INTERNAL MEDICINE

## 2022-02-17 PROCEDURE — 99999 PR PBB SHADOW E&M-EST. PATIENT-LVL V: ICD-10-PCS | Mod: PBBFAC,,, | Performed by: INTERNAL MEDICINE

## 2022-02-17 PROCEDURE — 1160F RVW MEDS BY RX/DR IN RCRD: CPT | Mod: CPTII,S$GLB,, | Performed by: INTERNAL MEDICINE

## 2022-02-17 PROCEDURE — 1159F PR MEDICATION LIST DOCUMENTED IN MEDICAL RECORD: ICD-10-PCS | Mod: CPTII,S$GLB,, | Performed by: INTERNAL MEDICINE

## 2022-02-17 PROCEDURE — 96375 TX/PRO/DX INJ NEW DRUG ADDON: CPT | Mod: PN

## 2022-02-17 PROCEDURE — 99214 PR OFFICE/OUTPT VISIT, EST, LEVL IV, 30-39 MIN: ICD-10-PCS | Mod: S$GLB,,, | Performed by: INTERNAL MEDICINE

## 2022-02-17 PROCEDURE — 63600175 PHARM REV CODE 636 W HCPCS: Mod: PN | Performed by: INTERNAL MEDICINE

## 2022-02-17 RX ORDER — SODIUM CHLORIDE 0.9 % (FLUSH) 0.9 %
10 SYRINGE (ML) INJECTION
Status: CANCELLED | OUTPATIENT
Start: 2022-02-17

## 2022-02-17 RX ORDER — DEXAMETHASONE SODIUM PHOSPHATE 4 MG/ML
4 INJECTION, SOLUTION INTRA-ARTICULAR; INTRALESIONAL; INTRAMUSCULAR; INTRAVENOUS; SOFT TISSUE
Status: CANCELLED
Start: 2022-02-17

## 2022-02-17 RX ORDER — SODIUM CHLORIDE 0.9 % (FLUSH) 0.9 %
10 SYRINGE (ML) INJECTION
Status: DISCONTINUED | OUTPATIENT
Start: 2022-02-17 | End: 2022-02-17 | Stop reason: HOSPADM

## 2022-02-17 RX ORDER — HEPARIN 100 UNIT/ML
500 SYRINGE INTRAVENOUS
Status: CANCELLED | OUTPATIENT
Start: 2022-02-17

## 2022-02-17 RX ORDER — HEPARIN 100 UNIT/ML
500 SYRINGE INTRAVENOUS
Status: DISCONTINUED | OUTPATIENT
Start: 2022-02-17 | End: 2022-02-17 | Stop reason: HOSPADM

## 2022-02-17 RX ORDER — DEXAMETHASONE SODIUM PHOSPHATE 4 MG/ML
4 INJECTION, SOLUTION INTRA-ARTICULAR; INTRALESIONAL; INTRAMUSCULAR; INTRAVENOUS; SOFT TISSUE
Status: COMPLETED | OUTPATIENT
Start: 2022-02-17 | End: 2022-02-17

## 2022-02-17 RX ADMIN — DEXAMETHASONE SODIUM PHOSPHATE 4 MG: 4 INJECTION INTRA-ARTICULAR; INTRALESIONAL; INTRAMUSCULAR; INTRAVENOUS; SOFT TISSUE at 11:02

## 2022-02-17 RX ADMIN — HEPARIN 500 UNITS: 100 SYRINGE at 01:02

## 2022-02-17 RX ADMIN — SODIUM CHLORIDE: 0.9 INJECTION, SOLUTION INTRAVENOUS at 11:02

## 2022-02-17 RX ADMIN — Medication 10 ML: at 01:02

## 2022-02-17 RX ADMIN — GEMCITABINE 1600 MG: 38 INJECTION, SOLUTION INTRAVENOUS at 12:02

## 2022-02-17 NOTE — Clinical Note
Obtain mri brain with and without contrast, echocardiogram and bilateral carotid doppler asa Neurology consult for TIA following these studies.

## 2022-02-17 NOTE — PROGRESS NOTES
History of present illness:  The patient is a 76-year-old white female recently discovered to have pleural mesothelioma.  Patient began experiencing difficulties with shortness of breath last fall.  She was eventually discovered to have pleural effusion and had a drain.  However recurred.  Patient eventually underwent pleural biopsy which returned positive for malignant mesothelioma.  She also had PleurX catheter placed to assist with periodic drainage of pleural fluid.  She drains every 2 days and obtains anywhere from 150-200 cc per drain.  Patient has some difficulties with thoracic discomfort and has hydrocodone 7.5-325 on hand.  She takes this only at night.  She denies weight loss, productive cough, hemoptysis, or worsening dyspnea.  She has been seen and evaluated by Dr. Novak and is scheduled to undergo staging laparoscopy to determine whether not she has spread of disease across the diaphragm into the abdomen.  Consult has been requested for assistance with use of neoadjuvant or palliative chemotherapy.     Since initial office consultation, patient is undergone exploratory laparotomy.  She was found not to have any diaphragmatic involvement or intra-abdominal disease in association with her diagnosis of malignant mesothelioma.  She returns to clinic to review interval studies in preparation for neoadjuvant chemotherapy.  MediPort catheter has been placed within the patient's right chest.     Patient returns to clinic for re-evaluation following 4 cycles of cisplatin/Alimta. Review of imaging and discussions in tumor board following completion of neoadjuvant therapy were for maintenance Alimta as opposed to surgical intervention.  However, patient's performance status was too poor to allow for such and she was observed until performance status would allow for therapy.  She has completed 1 cycle of maintenance Alimta and returns to clinic for toxicity assessment part way through her cycle.  Patient  reports increasing fatigue and disequilibrium.  She also has intermittent, evening, postprandial nausea emesis.  She is experiencing worsening dyspnea on exertion.  Pleural catheter drainage is nil.    Patient returns to clinic to review interval lab prior to initiation of cycle 4 of Keytruda.  Patient, unfortunately, failed therapy with Keytruda and has been transitioned to single agent Gemzar.  She returns to clinic for evaluation prior to cycle 4, day 1 of therapy.  After patient's last infusion, she experienced a unusual episode where she had uncontrolled movements of her head, slurred speech, and some impaired mobility of her left upper extremity.  Patient reports that symptoms lasted between 24 and 30 hours and resolved spontaneously after she consumed 2 baby aspirin.  Symptoms have not recurred.  Patient did not seek evaluation in the emergency room for this episode.        Physical examination:  Well-developed, well-nourished, elderly, white female, no acute distress, who has a weight of 120.5 lb (decreased by 1 lb).  VITAL SIGNS: Documented  and reviewed this visit.  HEENT: Normocephalic, atraumatic. Oral mucosa pink and moist. Lips without lesions. Tongue midline. Oropharynx clear. Nonicteric sclerae.   NECK: Supple, no adenopathy. No carotid bruits, thyromegaly or thyroid nodule.   HEART: Regular rate and rhythm without murmur, gallop or rub.   LUNGS:  Absent breath sounds over the right hemithorax.  Otherwise, clear to auscultation with normal respiratory effort.    ABDOMEN: Soft, nontender, nondistended with positive normoactive bowel sounds, no hepatosplenomegaly.   EXTREMITIES: No cyanosis, clubbing or edema. Distal pulses are intact.   AXILLAE AND GROIN: No palpable pathologic lymphadenopathy is appreciated.   SKIN: Intact/turgor normal.  MediPort catheter present within the right chest.  Surgical wound is well approximated and healed.  It is free from signs of infection.    NEUROLOGIC: Cranial  nerves II-XII grossly intact. Motor: Good muscle bulk and tone. Strength/sensory 5/5 throughout. Gait unstable/ambulating with the assistance of a cane.     Laboratory:  White count 16.9, hemoglobin 10.6, hematocrit 32.8, platelets 435, absolute neutrophil count is 13,200.  Sodium 138, potassium 4.8, chloride 103, CO2 28, BUN 28, creatinine 1.2, glucose 110, magnesium 2.2, alkaline phosphatase 247, the remainder liver function tests are within normal limits, LDH is 169, GFR is 44.     Guardant 360 panel:  Remarkable for TP53 mutation and an EGFR mutation of uncertain clinical significance.  Tumor was not MSI high.    Impression:  1. Malignant pleural mesothelioma.    2. Therapy associated fatigue malaise.  3. Depression/anxiety around issues of health.    4. Esophagitis - S/P dilatation and biopsy.  5. Mild chemotherapy associated anemia not in need intervention.  6. Stage 3 chronic kidney disease.  7. Transient ischemic attack.    Plan:  1.  Proceed with cycle 4, day 1 of therapy.  2. Return to clinic in 1 week with interval CBC patient to see nurse practitioner that visit.   3. Otherwise, see me 3 weeks from now with interval CBC, CMP, LDH, magnesium, and PET scan for restaging.  4. Obtain echocardiogram, bilateral carotid Doppler, and MRI of the brain with without contrast at earliest convenience.  5. Neurology referral for transient ischemic attack following the studies above.  6. Start 81 mg aspirin p.o. daily.  7. Patient is to report to the nearest emergency room should TIA symptoms recur.       This note was created using voice recognition software and may contain grammatical errors.

## 2022-02-17 NOTE — TELEPHONE ENCOUNTER
----- Message from Presley Mcdaniel MD sent at 2/17/2022 11:07 AM CST -----  Obtain mri brain with and without contrast, echocardiogram and bilateral carotid doppler asap  Neurology consult for TIA following these studies.

## 2022-02-17 NOTE — Clinical Note
Return to clinic in 1 week to see nurse practitioner with interval CBC. Return to clinic 3 weeks from now to see me with interval CBC, CMP, LDH, magnesium, and CT PET scan prior to appointment. done

## 2022-02-17 NOTE — PLAN OF CARE
Tolerated gemzar well.  No reactions noted.  No questions or concerns at this time.  Ambulated off unit in NAD.

## 2022-02-22 ENCOUNTER — HOSPITAL ENCOUNTER (OUTPATIENT)
Dept: RADIOLOGY | Facility: HOSPITAL | Age: 77
Discharge: HOME | End: 2022-02-22
Attending: INTERNAL MEDICINE
Payer: MEDICARE

## 2022-02-22 DIAGNOSIS — G45.9 TRANSIENT CEREBRAL ISCHEMIA, UNSPECIFIED TYPE: ICD-10-CM

## 2022-02-22 PROCEDURE — 93880 EXTRACRANIAL BILAT STUDY: CPT | Mod: TC,PO

## 2022-02-22 PROCEDURE — 93880 US CAROTID BILATERAL: ICD-10-PCS | Mod: 26,,, | Performed by: RADIOLOGY

## 2022-02-22 PROCEDURE — 93880 EXTRACRANIAL BILAT STUDY: CPT | Mod: 26,,, | Performed by: RADIOLOGY

## 2022-02-24 ENCOUNTER — OFFICE VISIT (OUTPATIENT)
Dept: HEMATOLOGY/ONCOLOGY | Facility: CLINIC | Age: 77
End: 2022-02-24
Payer: MEDICARE

## 2022-02-24 ENCOUNTER — INFUSION (OUTPATIENT)
Dept: INFUSION THERAPY | Facility: HOSPITAL | Age: 77
End: 2022-02-24
Attending: INTERNAL MEDICINE
Payer: MEDICARE

## 2022-02-24 VITALS
RESPIRATION RATE: 16 BRPM | HEIGHT: 65 IN | SYSTOLIC BLOOD PRESSURE: 124 MMHG | OXYGEN SATURATION: 99 % | TEMPERATURE: 97 F | BODY MASS INDEX: 20.06 KG/M2 | WEIGHT: 120.38 LBS | DIASTOLIC BLOOD PRESSURE: 70 MMHG | HEART RATE: 91 BPM

## 2022-02-24 VITALS
WEIGHT: 120.38 LBS | HEART RATE: 99 BPM | DIASTOLIC BLOOD PRESSURE: 70 MMHG | HEIGHT: 65 IN | SYSTOLIC BLOOD PRESSURE: 125 MMHG | RESPIRATION RATE: 16 BRPM | BODY MASS INDEX: 20.06 KG/M2 | OXYGEN SATURATION: 99 %

## 2022-02-24 DIAGNOSIS — C45.0 MALIGNANT MESOTHELIOMA OF PLEURA: Primary | ICD-10-CM

## 2022-02-24 DIAGNOSIS — T45.1X5A ANTINEOPLASTIC CHEMOTHERAPY INDUCED ANEMIA: ICD-10-CM

## 2022-02-24 DIAGNOSIS — D64.81 ANTINEOPLASTIC CHEMOTHERAPY INDUCED ANEMIA: ICD-10-CM

## 2022-02-24 PROCEDURE — 25000003 PHARM REV CODE 250: Mod: PN

## 2022-02-24 PROCEDURE — 63600175 PHARM REV CODE 636 W HCPCS: Mod: PN

## 2022-02-24 PROCEDURE — 1159F PR MEDICATION LIST DOCUMENTED IN MEDICAL RECORD: ICD-10-PCS | Mod: CPTII,S$GLB,,

## 2022-02-24 PROCEDURE — 3074F PR MOST RECENT SYSTOLIC BLOOD PRESSURE < 130 MM HG: ICD-10-PCS | Mod: CPTII,S$GLB,,

## 2022-02-24 PROCEDURE — 3288F FALL RISK ASSESSMENT DOCD: CPT | Mod: CPTII,S$GLB,,

## 2022-02-24 PROCEDURE — 3078F DIAST BP <80 MM HG: CPT | Mod: CPTII,S$GLB,,

## 2022-02-24 PROCEDURE — 3078F PR MOST RECENT DIASTOLIC BLOOD PRESSURE < 80 MM HG: ICD-10-PCS | Mod: CPTII,S$GLB,,

## 2022-02-24 PROCEDURE — 99999 PR PBB SHADOW E&M-EST. PATIENT-LVL V: ICD-10-PCS | Mod: PBBFAC,,,

## 2022-02-24 PROCEDURE — 1101F PR PT FALLS ASSESS DOC 0-1 FALLS W/OUT INJ PAST YR: ICD-10-PCS | Mod: CPTII,S$GLB,,

## 2022-02-24 PROCEDURE — 3074F SYST BP LT 130 MM HG: CPT | Mod: CPTII,S$GLB,,

## 2022-02-24 PROCEDURE — 99214 OFFICE O/P EST MOD 30 MIN: CPT | Mod: S$GLB,,,

## 2022-02-24 PROCEDURE — 1159F MED LIST DOCD IN RCRD: CPT | Mod: CPTII,S$GLB,,

## 2022-02-24 PROCEDURE — 3288F PR FALLS RISK ASSESSMENT DOCUMENTED: ICD-10-PCS | Mod: CPTII,S$GLB,,

## 2022-02-24 PROCEDURE — 96413 CHEMO IV INFUSION 1 HR: CPT | Mod: PN

## 2022-02-24 PROCEDURE — 99214 PR OFFICE/OUTPT VISIT, EST, LEVL IV, 30-39 MIN: ICD-10-PCS | Mod: S$GLB,,,

## 2022-02-24 PROCEDURE — 96375 TX/PRO/DX INJ NEW DRUG ADDON: CPT | Mod: PN

## 2022-02-24 PROCEDURE — 1126F AMNT PAIN NOTED NONE PRSNT: CPT | Mod: CPTII,S$GLB,,

## 2022-02-24 PROCEDURE — 99999 PR PBB SHADOW E&M-EST. PATIENT-LVL V: CPT | Mod: PBBFAC,,,

## 2022-02-24 PROCEDURE — 1126F PR PAIN SEVERITY QUANTIFIED, NO PAIN PRESENT: ICD-10-PCS | Mod: CPTII,S$GLB,,

## 2022-02-24 PROCEDURE — 1101F PT FALLS ASSESS-DOCD LE1/YR: CPT | Mod: CPTII,S$GLB,,

## 2022-02-24 RX ORDER — SODIUM CHLORIDE 0.9 % (FLUSH) 0.9 %
10 SYRINGE (ML) INJECTION
Status: DISCONTINUED | OUTPATIENT
Start: 2022-02-24 | End: 2022-02-24 | Stop reason: HOSPADM

## 2022-02-24 RX ORDER — DEXAMETHASONE SODIUM PHOSPHATE 4 MG/ML
4 INJECTION, SOLUTION INTRA-ARTICULAR; INTRALESIONAL; INTRAMUSCULAR; INTRAVENOUS; SOFT TISSUE
Status: CANCELLED
Start: 2022-02-24

## 2022-02-24 RX ORDER — SODIUM CHLORIDE 0.9 % (FLUSH) 0.9 %
10 SYRINGE (ML) INJECTION
Status: CANCELLED | OUTPATIENT
Start: 2022-02-24

## 2022-02-24 RX ORDER — DEXAMETHASONE SODIUM PHOSPHATE 4 MG/ML
4 INJECTION, SOLUTION INTRA-ARTICULAR; INTRALESIONAL; INTRAMUSCULAR; INTRAVENOUS; SOFT TISSUE
Status: COMPLETED | OUTPATIENT
Start: 2022-02-24 | End: 2022-02-24

## 2022-02-24 RX ORDER — HEPARIN 100 UNIT/ML
500 SYRINGE INTRAVENOUS
Status: DISCONTINUED | OUTPATIENT
Start: 2022-02-24 | End: 2022-02-24 | Stop reason: HOSPADM

## 2022-02-24 RX ORDER — HEPARIN 100 UNIT/ML
500 SYRINGE INTRAVENOUS
Status: CANCELLED | OUTPATIENT
Start: 2022-02-24

## 2022-02-24 RX ADMIN — GEMCITABINE 1600 MG: 38 INJECTION, SOLUTION INTRAVENOUS at 03:02

## 2022-02-24 RX ADMIN — SODIUM CHLORIDE: 0.9 INJECTION, SOLUTION INTRAVENOUS at 03:02

## 2022-02-24 RX ADMIN — Medication 500 UNITS: at 04:02

## 2022-02-24 RX ADMIN — DEXAMETHASONE SODIUM PHOSPHATE 4 MG: 4 INJECTION INTRA-ARTICULAR; INTRALESIONAL; INTRAMUSCULAR; INTRAVENOUS; SOFT TISSUE at 03:02

## 2022-02-24 NOTE — PROGRESS NOTES
Subjective:     Name: Yoanna Thapa  : 1945  MRN: [id number]    Chief Complaint: Follow-up, treatment     HPI:   Yoanna Thapa is a 76 y.o. female known to Dr. Mcdaniel who presents for evaluation of pleural mesothelioma and consideration of C4 D8 of Gemcitabine. She is accompanied by her .  After completion of cycle 3, she had what sounds like TIA with uncontrolled movements of her head, slurred speech, and impaired mobility of her LUE, sx lasting 24-30 hours. Carotid US on  showed no significant carotid stenosis. She underwent MRI brain on  that revealed no acute findings, no evidence of metastasis. ALEXANDREA appointment is pending.   Since her last visit she endorses some fatigue, feels she is tolerating Gemzar well. Had no additional neurologic sx, events or weakness.  Appetite has been fair; weight stable at 120 lbs.   Denies fevers, chills, rigors. Denies CP, palpitations, SOB, productive cough, abdominal pain, changes in bowel habits, urinary discomfort or frequency changes. No new lumps, bumps or swelling. Denies bruising or bleeding.     Prior History:   In the Fall of  she began experiencing difficulties with breathing and SOB. Was found to have pleural effusion. 2021 effusion recurred and she underwent a pleural biopsy which revealed malignant mesothelioma.   2021 Pleurx catheter was placed  2021 Staging laparotomy done. No diaphragmatic involvement or intra-abdominal disease in association with her diagnosis of malignant mesothelioma.   03/10/2021 to 2021- Neoadjuvant Cisplatin/Alimta .Performance status too poor to allow for surgical intervention.  Had once cycle of maintenance Alimta; tolerated poorly.  21 to 11/10/21- Keytruda; PET on 21 showed progressive disease  21- Began single agent Gemzar     Past Medical History:   Diagnosis Date    Anxiety     Breast cancer     Right: Lumpectomy and XRT    Cancer 2009    breast     Cancer of pleura, secondary 1/27/2021    Depression     Disorder of kidney and ureter     GERD (gastroesophageal reflux disease)     Hyperlipidemia     Hypertension     Malignant pleural effusion 1/27/2021    Symptomatic anemia 6/15/2021       Past Surgical History:   Procedure Laterality Date    APPENDECTOMY      BACK SURGERY      BREAST LUMPECTOMY Right     COSMETIC SURGERY      DIAGNOSTIC LAPAROSCOPY N/A 2/18/2021    Procedure: LAPAROSCOPY, DIAGNOSTIC;  Surgeon: Aravind Novak MD;  Location: [facility] OR 2ND FLR;  Service: Colon and Rectal;  Laterality: N/A;    ESOPHAGEAL DILATION N/A 5/20/2021    Procedure: DILATION, ESOPHAGUS;  Surgeon: HO Machuca MD;  Location: [facility] ENDO;  Service: Endoscopy;  Laterality: N/A;    ESOPHAGOGASTRODUODENOSCOPY N/A 5/20/2021    Procedure: EGD (ESOPHAGOGASTRODUODENOSCOPY);  Surgeon: HO Machuca MD;  Location: [facility] ENDO;  Service: Endoscopy;  Laterality: N/A;    HYSTERECTOMY      INSERTION OF SUBCUTANEOUS PORT      INSERTION OF TUNNELED CENTRAL VENOUS CATHETER (CVC) WITH SUBCUTANEOUS PORT  2/18/2021    Procedure: INSERTION, PORT-A-CATH;  Surgeon: Aravind Novak MD;  Location: [facility] OR 2ND FLR;  Service: Colon and Rectal;;    SPINE SURGERY      THORACENTESIS Right 12/28/2020    [place]    THORACENTESIS Right 01/24/2021    [facility]    TUBE THORACOTOMY Right 2/1/2021    Procedure: INSERTION, CATHETER, INTERCOSTAL, FOR DRAINAGE;  Surgeon: Esdras Jarrett MD;  Location: [facility] OR;  Service: Cardiothoracic;  Laterality: Right;    VIDEO-ASSISTED THORACOSCOPIC SURGERY (VATS) Right 1/27/2021    Procedure: VATS (VIDEO-ASSISTED THORACOSCOPIC SURGERY) - Pleural biopsy ;  Surgeon: Esdras Jarrett MD;  Location: [facility] OR;  Service: Cardiovascular;  Laterality: Right;           Review of patient's allergies indicates:   Allergen Reactions    Iodine and iodide containing products Anaphylaxis and Swelling     Dye causes Blisters.  Pt tolerates  shrimp and betadine.    Sulfa (sulfonamide antibiotics) Anaphylaxis and Hives    Adhesive Other (See Comments)     Blister    Blisters   Pt request paper tape.    Iodinated contrast media Blisters       Review of Systems   Constitutional: Negative for chills, decreased appetite, diaphoresis, fever, malaise/fatigue and night sweats.   HENT: Negative for congestion and sore throat.    Eyes: Negative for visual disturbance.   Cardiovascular: Negative for chest pain, dyspnea on exertion, leg swelling and palpitations.   Respiratory: Positive for wheezing (at night). Negative for cough and shortness of breath.    Hematologic/Lymphatic: Negative for adenopathy and bleeding problem. Does not bruise/bleed easily.   Skin: Negative for dry skin, itching and rash.   Musculoskeletal: Negative for joint pain, joint swelling and myalgias.   Gastrointestinal: Negative for abdominal pain, constipation, diarrhea, heartburn, hematochezia, nausea and vomiting.   Genitourinary: Negative for dysuria, frequency, hematuria and urgency.   Neurological: Negative for dizziness, headaches, numbness, paresthesias and weakness.       Objective:     There were no vitals filed for this visit.     Physical Exam  Vitals reviewed.   Constitutional:       General: She is not in acute distress.     Appearance: She is not diaphoretic.   HENT:      Head: Normocephalic and atraumatic.      Mouth/Throat:      Mouth: Mucous membranes are moist.      Pharynx: No oropharyngeal exudate or posterior oropharyngeal erythema.   Eyes:      General: No scleral icterus.     Pupils: Pupils are equal, round, and reactive to light.   Cardiovascular:      Rate and Rhythm: Normal rate and regular rhythm.      Heart sounds: No murmur heard.    No friction rub. No gallop.   Pulmonary:      Effort: Pulmonary effort is normal. No respiratory distress.      Breath sounds: Wheezing present. No rhonchi or rales.   Abdominal:      General: Bowel sounds are normal.       Palpations: There is no mass.      Tenderness: There is no abdominal tenderness. There is no guarding.   Musculoskeletal:         General: No swelling.      Cervical back: No tenderness.      Right lower leg: No edema.      Left lower leg: No edema.   Lymphadenopathy:      Cervical: No cervical adenopathy.   Skin:     General: Skin is warm and dry.      Findings: No bruising, erythema or rash.   Neurological:      Mental Status: She is alert and oriented to person, place, and time.      Motor: No weakness.      Coordination: Coordination normal.      Gait: Gait normal.   Psychiatric:         Mood and Affect: Mood normal.         Behavior: Behavior normal.         Judgment: Judgment normal.         Current Outpatient Medications on File Prior to Visit   Medication Sig    acetaminophen (TYLENOL) 500 MG tablet Take 1,000 mg by mouth every 6 (six) hours as needed for Pain.    albuterol (PROVENTIL/VENTOLIN HFA) 90 mcg/actuation inhaler Inhale 2 puffs into the lungs every 6 (six) hours as needed for Wheezing. Rescue    ALPRAZolam (XANAX) 0.25 MG tablet Take 1 tablet (0.25 mg total) by mouth 3 (three) times daily as needed for Anxiety.    amLODIPine (NORVASC) 5 MG tablet Take 1 tablet (5 mg total) by mouth once daily.    ascorbic acid, vitamin C, (VITAMIN C) 500 MG tablet Take 500 mg by mouth every morning.    atorvastatin (LIPITOR) 10 MG tablet Take 10 mg by mouth every morning.     cyclobenzaprine (FLEXERIL) 10 MG tablet Take 10 mg by mouth daily as needed.     docusate sodium (COLACE) 100 MG capsule Take 100 mg by mouth 2 (two) times daily as needed for Constipation.    folic acid (FOLVITE) 1 MG tablet Take 1 tablet (1 mg total) by mouth once daily.    gabapentin (NEURONTIN) 300 MG capsule Take 1 capsule (300 mg total) by mouth 2 (two) times daily.    HYDROcodone-acetaminophen (NORCO) 5-325 mg per tablet Take 1 tablet by mouth every 6 (six) hours as needed for Pain.    LIDOcaine-prilocaine (EMLA) cream Apply  topically as needed.    loratadine (CLARITIN) 10 mg tablet Take 1 tablet by mouth once daily.    losartan (COZAAR) 100 MG tablet Take 100 mg by mouth every morning.     megestroL (MEGACE ES) 625 mg/5 mL (125 mg/mL) Susp Take 5 mLs (625 mg total) by mouth once daily.    megestroL (MEGACE) 400 mg/10 mL (40 mg/mL) Susp Take 200 mg by mouth once daily.    multivitamin capsule Take 1 capsule by mouth once daily.    ondansetron (ZOFRAN-ODT) 4 MG TbDL 4 mg every 6 (six) hours.    pantoprazole (PROTONIX) 40 MG tablet Take 40 mg by mouth every morning.     paroxetine (PAXIL) 20 MG tablet Take 1 tablet by mouth every morning.    potassium chloride (KLOR-CON) 10 MEQ TbSR TAKE 1 TABLET (10 MEQ) BY MOUTH 1 TIME A DAY    potassium chloride SA (K-DUR,KLOR-CON) 20 MEQ tablet Take 1 tablet (20 mEq total) by mouth 2 (two) times daily. (Patient taking differently: Take 20 mEq by mouth once daily.)    prochlorperazine (COMPAZINE) 10 MG tablet Take 1 tablet (10 mg total) by mouth every 6 (six) hours as needed (nausea).    triamcinolone acetonide 0.025% (KENALOG) 0.025 % cream Apply topically 2 (two) times daily.    triamcinolone acetonide 0.025% (KENALOG) 0.025 % Oint Apply topically 2 (two) times daily. Apply to affected area twice daily    venlafaxine (EFFEXOR XR) 75 MG 24 hr capsule Take 2 capsules (150 mg total) by mouth once daily.    water Liqd 150 mL with MILK OF MAGNESIA 400 mg/5 mL Susp 400 mg, diphenhydrAMINE 12.5 mg/5 mL Elix 60 mg, nystatin 100,000 unit/mL Susp 500,000 Units SWISH & SWALLOW 5 ML'S EVERY 4 HOURS AS NEEDED FOR MOUTH & THROAT PAIN     Current Facility-Administered Medications on File Prior to Visit   Medication    ceFAZolin injection 2 g       CBC:  Lab Results   Component Value Date    WBC 7.10 02/24/2022    HGB 9.6 (L) 02/24/2022    HCT 29.4 (L) 02/24/2022     (H) 02/24/2022     02/24/2022         CMP:  Sodium   Date Value Ref Range Status   02/17/2022 138 136 - 145 mmol/L Final      Potassium   Date Value Ref Range Status   02/17/2022 4.8 3.5 - 5.1 mmol/L Final     Chloride   Date Value Ref Range Status   02/17/2022 103 95 - 110 mmol/L Final     CO2   Date Value Ref Range Status   02/17/2022 28 23 - 29 mmol/L Final     Glucose   Date Value Ref Range Status   02/17/2022 110 70 - 110 mg/dL Final     BUN   Date Value Ref Range Status   02/17/2022 28 (H) 8 - 23 mg/dL Final     Creatinine   Date Value Ref Range Status   02/17/2022 1.2 0.5 - 1.4 mg/dL Final     Calcium   Date Value Ref Range Status   02/17/2022 9.9 8.7 - 10.5 mg/dL Final     Total Protein   Date Value Ref Range Status   02/17/2022 8.2 6.0 - 8.4 g/dL Final     Albumin   Date Value Ref Range Status   02/17/2022 3.7 3.5 - 5.2 g/dL Final     Total Bilirubin   Date Value Ref Range Status   02/17/2022 0.3 0.1 - 1.0 mg/dL Final     Comment:     For infants and newborns, interpretation of results should be based  on gestational age, weight and in agreement with clinical  observations.    Premature Infant recommended reference ranges:  Up to 24 hours.............<8.0 mg/dL  Up to 48 hours............<12.0 mg/dL  3-5 days..................<15.0 mg/dL  6-29 days.................<15.0 mg/dL       Alkaline Phosphatase   Date Value Ref Range Status   02/17/2022 247 (H) 55 - 135 U/L Final     AST   Date Value Ref Range Status   02/17/2022 18 10 - 40 U/L Final     ALT   Date Value Ref Range Status   02/17/2022 20 10 - 44 U/L Final     Anion Gap   Date Value Ref Range Status   02/17/2022 7 (L) 8 - 16 mmol/L Final     eGFR if    Date Value Ref Range Status   02/17/2022 51 (A) >60 mL/min/1.73 m^2 Final     eGFR if non    Date Value Ref Range Status   02/17/2022 44 (A) >60 mL/min/1.73 m^2 Final     Comment:     Calculation used to obtain the estimated glomerular filtration  rate (eGFR) is the CKD-EPI equation.          Assessment:       No diagnosis found.     Plan:   Malignant mesothelioma of pleura  -Proceed with  Gemzar C4 D8 today as planned   -Udenyca D9 as planned  -Follow up in 2 weeks for next cycle and labs     Anemia   -Chemotherapy induced anemia  -Last received Retacrit 09/2021; have been holding for Hgb >11   -Hbg today 9.6 g/dL  -Will continue to hold Retacrit in light of her recent TIA, counts have been stable    Follow-up:  ALEXANDREA planned for 3/3/22  PET/CT,  CBC, CMP, LDH, Magnesium level on 3/9/22.   Follow-up with Dr. Mcdaniel in two weeks (3/10) for consideration of Cycle 5 Gemzar.    Patient queried and all questions were answered.

## 2022-02-24 NOTE — Clinical Note
Proceed with C4D8 Gemzar today, Udencya tomorrow 2/25 PET/CT and labs on 3/7/22 MD follow-up with MD 3/9/22 Planned C5D1 on 3/10/22

## 2022-02-24 NOTE — PLAN OF CARE
Problem: Adult Inpatient Plan of Care  Goal: Plan of Care Review  Outcome: Ongoing, Progressing  Flowsheets (Taken 2/24/2022 1714)  Plan of Care Reviewed With: patient  Goal: Patient-Specific Goal (Individualized)  Outcome: Ongoing, Progressing  Flowsheets (Taken 2/24/2022 1714)  Anxieties, Fears or Concerns: None  Individualized Care Needs: Recliner, ice water.  Patient-Specific Goals (Include Timeframe): Free of S/S of reaction with treatment.  Goal: Optimal Comfort and Wellbeing  Outcome: Ongoing, Progressing     Patient to Infusion for Gemzar after appointment with Mae Reyes NP. VSS. Tolerated infusion. Provided with copy of upcoming appointment schedule. Escorted to the front lobby for discharge to home.

## 2022-02-25 ENCOUNTER — INFUSION (OUTPATIENT)
Dept: INFUSION THERAPY | Facility: HOSPITAL | Age: 77
End: 2022-02-25
Attending: INTERNAL MEDICINE
Payer: MEDICARE

## 2022-02-25 VITALS
RESPIRATION RATE: 18 BRPM | TEMPERATURE: 98 F | HEART RATE: 100 BPM | SYSTOLIC BLOOD PRESSURE: 149 MMHG | DIASTOLIC BLOOD PRESSURE: 75 MMHG

## 2022-02-25 DIAGNOSIS — C45.0 MALIGNANT MESOTHELIOMA OF PLEURA: Primary | ICD-10-CM

## 2022-02-25 PROCEDURE — 63600175 PHARM REV CODE 636 W HCPCS: Mod: TB,PN

## 2022-02-25 PROCEDURE — 96372 THER/PROPH/DIAG INJ SC/IM: CPT | Mod: PN

## 2022-02-25 RX ADMIN — PEGFILGRASTIM-CBQV 6 MG: 6 INJECTION, SOLUTION SUBCUTANEOUS at 09:02

## 2022-02-25 NOTE — PLAN OF CARE
Problem: Adult Inpatient Plan of Care  Goal: Plan of Care Review  Outcome: Ongoing, Progressing  Flowsheets (Taken 2/25/2022 0913)  Plan of Care Reviewed With: patient  Goal: Patient-Specific Goal (Individualized)  Outcome: Ongoing, Progressing     Problem: Fatigue  Goal: Improved Activity Tolerance  Outcome: Ongoing, Progressing     Pt tolerated Udencya injection well.   No adverse reaction noted.  Pt left clinic in no acute distress.

## 2022-03-03 ENCOUNTER — HOSPITAL ENCOUNTER (OUTPATIENT)
Dept: CARDIOLOGY | Facility: HOSPITAL | Age: 77
Discharge: HOME | End: 2022-03-03
Attending: INTERNAL MEDICINE
Payer: MEDICARE

## 2022-03-03 VITALS
SYSTOLIC BLOOD PRESSURE: 149 MMHG | HEART RATE: 104 BPM | WEIGHT: 120 LBS | HEIGHT: 65 IN | DIASTOLIC BLOOD PRESSURE: 75 MMHG | BODY MASS INDEX: 19.99 KG/M2

## 2022-03-03 DIAGNOSIS — G45.9 TRANSIENT CEREBRAL ISCHEMIA, UNSPECIFIED TYPE: ICD-10-CM

## 2022-03-03 LAB
AORTIC ROOT ANNULUS: 2.77 CM
AV INDEX (PROSTH): 0.59
AV MEAN GRADIENT: 8 MMHG
AV PEAK GRADIENT: 13 MMHG
AV REGURGITATION PRESSURE HALF TIME: 408.33 MS
AV VALVE AREA: 2.1 CM2
AV VELOCITY RATIO: 0.64
BSA FOR ECHO PROCEDURE: 1.58 M2
CV ECHO LV RWT: 0.67 CM
DOP CALC AO PEAK VEL: 1.82 M/S
DOP CALC AO VTI: 36 CM
DOP CALC LVOT AREA: 3.5 CM2
DOP CALC LVOT DIAMETER: 2.12 CM
DOP CALC LVOT PEAK VEL: 1.16 M/S
DOP CALC LVOT STROKE VOLUME: 75.5 CM3
DOP CALC RVOT PEAK VEL: 0.72 M/S
DOP CALC RVOT VTI: 12.6 CM
DOP CALCLVOT PEAK VEL VTI: 21.4 CM
E WAVE DECELERATION TIME: 107.56 MSEC
E/A RATIO: 0.84
E/E' RATIO: 8.4 M/S
ECHO EF ESTIMATED: 56 %
ECHO LV POSTERIOR WALL: 1.23 CM (ref 0.6–1.1)
EJECTION FRACTION: 60 %
FRACTIONAL SHORTENING: 29 % (ref 28–44)
INTERVENTRICULAR SEPTUM: 1.18 CM (ref 0.6–1.1)
IVRT: 74.22 MSEC
LA MAJOR: 4.09 CM
LA MINOR: 4.3 CM
LA WIDTH: 3.02 CM
LEFT ATRIUM SIZE: 2.67 CM
LEFT ATRIUM VOLUME INDEX MOD: 8.2 ML/M2
LEFT ATRIUM VOLUME INDEX: 18.1 ML/M2
LEFT ATRIUM VOLUME MOD: 13.11 CM3
LEFT ATRIUM VOLUME: 28.73 CM3
LEFT INTERNAL DIMENSION IN SYSTOLE: 2.62 CM (ref 2.1–4)
LEFT VENTRICLE DIASTOLIC VOLUME INDEX: 35.97 ML/M2
LEFT VENTRICLE DIASTOLIC VOLUME: 57.19 ML
LEFT VENTRICLE MASS INDEX: 92 G/M2
LEFT VENTRICLE SYSTOLIC VOLUME INDEX: 15.8 ML/M2
LEFT VENTRICLE SYSTOLIC VOLUME: 25.08 ML
LEFT VENTRICULAR INTERNAL DIMENSION IN DIASTOLE: 3.67 CM (ref 3.5–6)
LEFT VENTRICULAR MASS: 146.47 G
LV LATERAL E/E' RATIO: 8.4 M/S
LV SEPTAL E/E' RATIO: 8.4 M/S
LVOT MG: 3.47 MMHG
LVOT MV: 0.9 CM/S
MV PEAK A VEL: 1 M/S
MV PEAK E VEL: 0.84 M/S
MV STENOSIS PRESSURE HALF TIME: 31.19 MS
MV VALVE AREA P 1/2 METHOD: 7.05 CM2
PISA AR MAX VEL: 4.94 M/S
PISA TR MAX VEL: 2.67 M/S
PULM VEIN S/D RATIO: 1.31
PV MEAN GRADIENT: 1.3 MMHG
PV PEAK D VEL: 0.43 M/S
PV PEAK S VEL: 0.57 M/S
PV PEAK VELOCITY: 1.31 CM/S
RA MAJOR: 3.89 CM
RA PRESSURE: 8 MMHG
RA WIDTH: 2.46 CM
RIGHT VENTRICULAR END-DIASTOLIC DIMENSION: 2.26 CM
SINUS: 2.23 CM
STJ: 1.95 CM
TDI LATERAL: 0.1 M/S
TDI SEPTAL: 0.1 M/S
TDI: 0.1 M/S
TR MAX PG: 29 MMHG
TRICUSPID ANNULAR PLANE SYSTOLIC EXCURSION: 1.87 CM
TV REST PULMONARY ARTERY PRESSURE: 37 MMHG

## 2022-03-03 PROCEDURE — 93306 TTE W/DOPPLER COMPLETE: CPT | Mod: 26,,, | Performed by: INTERNAL MEDICINE

## 2022-03-03 PROCEDURE — 93306 ECHO (CUPID ONLY): ICD-10-PCS | Mod: 26,,, | Performed by: INTERNAL MEDICINE

## 2022-03-03 PROCEDURE — 93356 ECHO (CUPID ONLY): ICD-10-PCS | Mod: ,,, | Performed by: INTERNAL MEDICINE

## 2022-03-03 PROCEDURE — 93356 MYOCRD STRAIN IMG SPCKL TRCK: CPT | Mod: ,,, | Performed by: INTERNAL MEDICINE

## 2022-03-03 PROCEDURE — 93356 MYOCRD STRAIN IMG SPCKL TRCK: CPT | Mod: PO

## 2022-03-07 ENCOUNTER — HOSPITAL ENCOUNTER (OUTPATIENT)
Dept: RADIOLOGY | Facility: HOSPITAL | Age: 77
Discharge: HOME | End: 2022-03-07
Attending: INTERNAL MEDICINE
Payer: MEDICARE

## 2022-03-07 DIAGNOSIS — C45.0 MALIGNANT MESOTHELIOMA OF PLEURA: ICD-10-CM

## 2022-03-07 LAB — GLUCOSE SERPL-MCNC: 88 MG/DL (ref 70–110)

## 2022-03-07 PROCEDURE — 78815 NM PET CT ROUTINE: ICD-10-PCS | Mod: 26,PS,, | Performed by: RADIOLOGY

## 2022-03-07 PROCEDURE — A9552 F18 FDG: HCPCS | Mod: PN

## 2022-03-07 PROCEDURE — 78815 PET IMAGE W/CT SKULL-THIGH: CPT | Mod: 26,PS,, | Performed by: RADIOLOGY

## 2022-03-07 NOTE — PROGRESS NOTES
PET Imaging Questionnaire    1. Are you a Diabetic? Recent Blood Sugar level? No    2. Are you anemic? Bone Marrow Stimulation Meds? No    3. Have you had a CT Scan, if so when & where was your last one? Yes -     4. Have you had a PET Scan, if so when & where was your last one? Yes -     5. Chemotherapy or currently on Chemotherapy? Yes    6. Radiation therapy? Yes    Surgical History:   Past Surgical History:   Procedure Laterality Date    APPENDECTOMY      BACK SURGERY      BREAST LUMPECTOMY Right     COSMETIC SURGERY      DIAGNOSTIC LAPAROSCOPY N/A 2/18/2021    Procedure: LAPAROSCOPY, DIAGNOSTIC;  Surgeon: Aravind Novak MD;  Location: [facility] OR 2ND FLR;  Service: Colon and Rectal;  Laterality: N/A;    ESOPHAGEAL DILATION N/A 5/20/2021    Procedure: DILATION, ESOPHAGUS;  Surgeon: HO Machuca MD;  Location: [facility] ENDO;  Service: Endoscopy;  Laterality: N/A;    ESOPHAGOGASTRODUODENOSCOPY N/A 5/20/2021    Procedure: EGD (ESOPHAGOGASTRODUODENOSCOPY);  Surgeon: HO Machuca MD;  Location: [facility] ENDO;  Service: Endoscopy;  Laterality: N/A;    HYSTERECTOMY      INSERTION OF SUBCUTANEOUS PORT      INSERTION OF TUNNELED CENTRAL VENOUS CATHETER (CVC) WITH SUBCUTANEOUS PORT  2/18/2021    Procedure: INSERTION, PORT-A-CATH;  Surgeon: Aravind Novak MD;  [facility];  Service: Colon and Rectal;;    SPINE SURGERY      THORACENTESIS Right 12/28/2020    [place]    THORACENTESIS Right 01/24/2021    [facility]    TUBE THORACOTOMY Right 2/1/2021    Procedure: INSERTION, CATHETER, INTERCOSTAL, FOR DRAINAGE;  Surgeon: Esdras Jarrett MD;  Location: [facility] OR;  Service: Cardiothoracic;  Laterality: Right;    VIDEO-ASSISTED THORACOSCOPIC SURGERY (VATS) Right 1/27/2021    Procedure: VATS (VIDEO-ASSISTED THORACOSCOPIC SURGERY) - Pleural biopsy ;  Surgeon: Esdras Jarrett MD;  Location: [facility] OR;  Service: Cardiovascular;  Laterality: Right;   7.      8. Have you been fasting for at  least 6 hours? Yes    9. Is there any chance you may be pregnant or breastfeeding? No    Assay: 11.55 MCi@:9.53   Injection Site:lt ac     Residual: 1.12 mCi@: 9.55   Technologist: Daksha Shaw Injected:10.43mCi

## 2022-03-09 ENCOUNTER — OFFICE VISIT (OUTPATIENT)
Dept: HEMATOLOGY/ONCOLOGY | Facility: CLINIC | Age: 77
End: 2022-03-09
Payer: MEDICARE

## 2022-03-09 VITALS
WEIGHT: 123.69 LBS | OXYGEN SATURATION: 98 % | SYSTOLIC BLOOD PRESSURE: 149 MMHG | TEMPERATURE: 97 F | RESPIRATION RATE: 16 BRPM | HEIGHT: 65 IN | DIASTOLIC BLOOD PRESSURE: 86 MMHG | BODY MASS INDEX: 20.61 KG/M2 | HEART RATE: 104 BPM

## 2022-03-09 DIAGNOSIS — T45.1X5A ANTINEOPLASTIC CHEMOTHERAPY INDUCED ANEMIA: ICD-10-CM

## 2022-03-09 DIAGNOSIS — D64.81 ANTINEOPLASTIC CHEMOTHERAPY INDUCED ANEMIA: ICD-10-CM

## 2022-03-09 DIAGNOSIS — R53.83 FATIGUE, UNSPECIFIED TYPE: ICD-10-CM

## 2022-03-09 DIAGNOSIS — C45.0 MALIGNANT MESOTHELIOMA OF PLEURA: Primary | ICD-10-CM

## 2022-03-09 DIAGNOSIS — G45.9 TRANSIENT CEREBRAL ISCHEMIA, UNSPECIFIED TYPE: ICD-10-CM

## 2022-03-09 DIAGNOSIS — F32.0 CURRENT MILD EPISODE OF MAJOR DEPRESSIVE DISORDER WITHOUT PRIOR EPISODE: ICD-10-CM

## 2022-03-09 DIAGNOSIS — N18.30 STAGE 3 CHRONIC KIDNEY DISEASE, UNSPECIFIED WHETHER STAGE 3A OR 3B CKD: ICD-10-CM

## 2022-03-09 PROCEDURE — 1160F RVW MEDS BY RX/DR IN RCRD: CPT | Mod: CPTII,S$GLB,, | Performed by: INTERNAL MEDICINE

## 2022-03-09 PROCEDURE — 1159F PR MEDICATION LIST DOCUMENTED IN MEDICAL RECORD: ICD-10-PCS | Mod: CPTII,S$GLB,, | Performed by: INTERNAL MEDICINE

## 2022-03-09 PROCEDURE — 3288F PR FALLS RISK ASSESSMENT DOCUMENTED: ICD-10-PCS | Mod: CPTII,S$GLB,, | Performed by: INTERNAL MEDICINE

## 2022-03-09 PROCEDURE — 99999 PR PBB SHADOW E&M-EST. PATIENT-LVL V: CPT | Mod: PBBFAC,,, | Performed by: INTERNAL MEDICINE

## 2022-03-09 PROCEDURE — 99215 OFFICE O/P EST HI 40 MIN: CPT | Mod: S$GLB,,, | Performed by: INTERNAL MEDICINE

## 2022-03-09 PROCEDURE — 99215 PR OFFICE/OUTPT VISIT, EST, LEVL V, 40-54 MIN: ICD-10-PCS | Mod: S$GLB,,, | Performed by: INTERNAL MEDICINE

## 2022-03-09 PROCEDURE — 3079F DIAST BP 80-89 MM HG: CPT | Mod: CPTII,S$GLB,, | Performed by: INTERNAL MEDICINE

## 2022-03-09 PROCEDURE — 3077F SYST BP >= 140 MM HG: CPT | Mod: CPTII,S$GLB,, | Performed by: INTERNAL MEDICINE

## 2022-03-09 PROCEDURE — 1160F PR REVIEW ALL MEDS BY PRESCRIBER/CLIN PHARMACIST DOCUMENTED: ICD-10-PCS | Mod: CPTII,S$GLB,, | Performed by: INTERNAL MEDICINE

## 2022-03-09 PROCEDURE — 99999 PR PBB SHADOW E&M-EST. PATIENT-LVL V: ICD-10-PCS | Mod: PBBFAC,,, | Performed by: INTERNAL MEDICINE

## 2022-03-09 PROCEDURE — 1126F AMNT PAIN NOTED NONE PRSNT: CPT | Mod: CPTII,S$GLB,, | Performed by: INTERNAL MEDICINE

## 2022-03-09 PROCEDURE — 3288F FALL RISK ASSESSMENT DOCD: CPT | Mod: CPTII,S$GLB,, | Performed by: INTERNAL MEDICINE

## 2022-03-09 PROCEDURE — 1101F PR PT FALLS ASSESS DOC 0-1 FALLS W/OUT INJ PAST YR: ICD-10-PCS | Mod: CPTII,S$GLB,, | Performed by: INTERNAL MEDICINE

## 2022-03-09 PROCEDURE — 1101F PT FALLS ASSESS-DOCD LE1/YR: CPT | Mod: CPTII,S$GLB,, | Performed by: INTERNAL MEDICINE

## 2022-03-09 PROCEDURE — 1159F MED LIST DOCD IN RCRD: CPT | Mod: CPTII,S$GLB,, | Performed by: INTERNAL MEDICINE

## 2022-03-09 PROCEDURE — 99497 ADVNCD CARE PLAN 30 MIN: CPT | Mod: S$GLB,,, | Performed by: INTERNAL MEDICINE

## 2022-03-09 PROCEDURE — 3077F PR MOST RECENT SYSTOLIC BLOOD PRESSURE >= 140 MM HG: ICD-10-PCS | Mod: CPTII,S$GLB,, | Performed by: INTERNAL MEDICINE

## 2022-03-09 PROCEDURE — 99497 PR ADVNCD CARE PLAN 30 MIN: ICD-10-PCS | Mod: S$GLB,,, | Performed by: INTERNAL MEDICINE

## 2022-03-09 PROCEDURE — 3079F PR MOST RECENT DIASTOLIC BLOOD PRESSURE 80-89 MM HG: ICD-10-PCS | Mod: CPTII,S$GLB,, | Performed by: INTERNAL MEDICINE

## 2022-03-09 PROCEDURE — 1126F PR PAIN SEVERITY QUANTIFIED, NO PAIN PRESENT: ICD-10-PCS | Mod: CPTII,S$GLB,, | Performed by: INTERNAL MEDICINE

## 2022-03-09 RX ORDER — DEXAMETHASONE 4 MG/1
4 TABLET ORAL
Status: CANCELLED | OUTPATIENT
Start: 2022-03-15

## 2022-03-09 RX ORDER — HEPARIN 100 UNIT/ML
500 SYRINGE INTRAVENOUS
Status: CANCELLED | OUTPATIENT
Start: 2022-03-15

## 2022-03-09 RX ORDER — SODIUM CHLORIDE 0.9 % (FLUSH) 0.9 %
10 SYRINGE (ML) INJECTION
Status: CANCELLED | OUTPATIENT
Start: 2022-03-15

## 2022-03-09 NOTE — PROGRESS NOTES
History of present illness:  The patient is a 76-year-old white female recently discovered to have pleural mesothelioma.  Patient began experiencing difficulties with shortness of breath last fall.  She was eventually discovered to have pleural effusion and had a drain.  However recurred.  Patient eventually underwent pleural biopsy which returned positive for malignant mesothelioma.  She also had PleurX catheter placed to assist with periodic drainage of pleural fluid.  She drains every 2 days and obtains anywhere from 150-200 cc per drain.  Patient has some difficulties with thoracic discomfort and has hydrocodone 7.5-325 on hand.  She takes this only at night.  She denies weight loss, productive cough, hemoptysis, or worsening dyspnea.  She has been seen and evaluated by Dr. Novak and is scheduled to undergo staging laparoscopy to determine whether not she has spread of disease across the diaphragm into the abdomen.  Consult has been requested for assistance with use of neoadjuvant or palliative chemotherapy.     Since initial office consultation, patient is undergone exploratory laparotomy.  She was found not to have any diaphragmatic involvement or intra-abdominal disease in association with her diagnosis of malignant mesothelioma.  She returns to clinic to review interval studies in preparation for neoadjuvant chemotherapy.  MediPort catheter has been placed within the patient's right chest.     Patient returns to clinic for re-evaluation following 4 cycles of cisplatin/Alimta. Review of imaging and discussions in tumor board following completion of neoadjuvant therapy were for maintenance Alimta as opposed to surgical intervention.  However, patient's performance status was too poor to allow for such and she was observed until performance status would allow for therapy.  She has completed 1 cycle of maintenance Alimta and returns to clinic for toxicity assessment part way through her cycle.  Patient  reports increasing fatigue and disequilibrium.  She also has intermittent, evening, postprandial nausea emesis.  She is experiencing worsening dyspnea on exertion.  Pleural catheter drainage is nil.    Patient returns to clinic to review interval lab prior to initiation of cycle 4 of Keytruda.  Patient, unfortunately, failed therapy with Keytruda and has been transitioned to single agent Gemzar.  She returns to clinic for evaluation prior to cycle 4, day 8 of therapy.  Patient has had no additional symptoms of TIA.  Patient has yet to be evaluated by Neurology.  Appointment has been scheduled for 04/19/2022.      Physical examination:  Well-developed, well-nourished, elderly, white female, no acute distress, who has a weight of 123.5 lb (increased by 3 lb).  VITAL SIGNS: Documented  and reviewed this visit.  HEENT: Normocephalic, atraumatic. Oral mucosa pink and moist. Lips without lesions. Tongue midline. Oropharynx clear. Nonicteric sclerae.   NECK: Supple, no adenopathy. No carotid bruits, thyromegaly or thyroid nodule.   HEART: Regular rate and rhythm without murmur, gallop or rub.   LUNGS:  Absent breath sounds over the right hemithorax.  Otherwise, clear to auscultation with normal respiratory effort.    ABDOMEN: Soft, nontender, nondistended with positive normoactive bowel sounds, no hepatosplenomegaly.   EXTREMITIES: No cyanosis, clubbing or edema. Distal pulses are intact.   AXILLAE AND GROIN: No palpable pathologic lymphadenopathy is appreciated.   SKIN: Intact/turgor normal.  MediPort catheter present within the right chest.  Surgical wound is well approximated and healed.  It is free from signs of infection.    NEUROLOGIC: Cranial nerves II-XII grossly intact. Motor: Good muscle bulk and tone. Strength/sensory 5/5 throughout. Gait unstable/ambulating with the assistance of a cane.     Laboratory:  White count 20.6, hemoglobin 10.5, hematocrit 32.4, platelets 187, absolute neutrophil count 16,700.  Sodium  139, potassium 5, chloride 104, CO2 23, BUN 23, creatinine 1.1, glucose 97, calcium 9.7, alkaline phosphatase 272, the remainder liver function test within normal limits, , GFR is 49.    CT PET scan:  Head and neck:   A markedly hypermetabolic right parotid mass is noted image seventeen sequence 603 with an SUV to 54.5 slightly less than on the prior with a measurement of 12 by 8 mm image 18 unchanged when measured in a similar manner.  A heterogeneous irregular thyroid gland is noted with an enlarged right lobe in comparison with the left similar to on the prior.  Hypermetabolic activity is noted at the lower pole of the left lobe of the thyroid gland in particular within SUV of 3.2.  This appears similar to on the prior.  Correlation with ultrasound may be of use.  Underlying lesion is not excluded.  Thorax:  A new focus of hypermetabolic activity is noted along right at the lung apex image 58 since the prior   A pretracheal image 67 and paratracheal focus of hypermetabolic activity that appear to have been present on the prior.  New hypermetabolic activity is noted in a pre cardial location image 75 as well as anterior to the right mainstem bronchus image 75 since the prior.  Paraesophageal hypermetabolic activity is noted progressed since the prior along the pleural surface on the right.   At the mid right hemithorax image 68 hypermetabolic activity is noted along the pleura to 12.0 similar to on the prior  Subcarinal hypermetabolic activity is noted image 86 with an SUV of 9.9 slightly elevated since the prior a measurement of 17 by 17 mm is noted slightly larger than on the prior where a measurement of 16 x 12 mm is noted in this region  Abdomen and pelvis:  No new foci of hypermetabolic activity are noted within the abdomen and pelvis.  A nonobstructive right renal stone appears to be present of 7 mm.  Increased liver density is noted as can be seen with liver disease, amiodarone exposure, glycogen  storage diseases.  Please clinically correlate.  Musculoskeletal:  Extensive osseous hypermetabolic activity is noted with focal regions identified.  This appears diffuse which favors post treatment change but this could easily obscure underlying disease or even diffuse osseous metastatic disease.    Bilateral carotid Doppler:  No evidence of significant carotid stenosis.    MRI of the brain:  No acute findings in the brain or evidence of metastatic disease.    Echocardiogram:  · The left ventricular global longitudinal strain is -19.75%.  · The left ventricle is normal in size with mild concentric hypertrophy and normal systolic function.  · The estimated ejection fraction is 60%.  · Normal left ventricular diastolic function.  · Normal right ventricular size with normal right ventricular systolic function.  · Mild aortic regurgitation.  · Mild tricuspid regurgitation.  · Intermediate central venous pressure (8 mmHg).  · The estimated PA systolic pressure is 37 mmHg.    Guardant 360 panel:  Remarkable for TP53 mutation and an EGFR mutation of uncertain clinical significance.  Tumor was not MSI high.    Impression:  1. Malignant pleural mesothelioma with disease progression on Gemzar.    2. Therapy associated fatigue malaise.  3. Depression/anxiety around issues of health.    4. Esophagitis - S/P dilatation and biopsy.  5. Mild chemotherapy associated anemia not in need intervention.  6. Stage 3 chronic kidney disease.  7. Transient ischemic attack.    Plan:  1. Discontinue Gemzar.   2. Initiate therapy with now will be in 25 milligram/meter squared per week on days 1, 8, and 15 of each 28 day cycle.  3. Continue daily aspirin.  4. Keep scheduled follow-up with Neurology.  5. 40 minutes of contact time spent counseling the patient regarding progressive nature disease, rationale for changing therapy, new antineoplastic/supportive care meds to be employed in therapy, aspects of their administration, potential side  effects associated with therapy, interventions for such side effects, etcetera.  The patient voices understanding wishes to proceed as above.  6. Patient is to return to clinic in earliest convenience for cycle 1, day 1 of therapy.  She will be seen by nurse practitioner's on days 8 and 15 of cycle 1.  7. I plan to re-evaluate the patient prior to initiation of cycle 2 with interval CBC, CMP, LDH, magnesium prior to that appointment.      Advance Care Planning During this visit, I engaged the patient in the advance care planning process.  The patient and I reviewed the role for advance directives and their purpose in directing future healthcare if the patients unable to speak for him/herself.  At this point in time, the patient does have full decision-making capacity.  We discussed different extreme health states that they could experience, and reviewed what kind of medical care the patient would want in those situations.  The patient communicated that if they were comatose and had little chance of a meaningful recovery, they would not want machines/life-sustaining treatments used.  In addition to the above preference, other important end-of-life issues for the patient include DNR.  The patient has  completed a living will to reflect these preferences.  The patient has already designated a healthcare power of  (her ) to make decisions on their behalf.    30 minutes time spent.  This note was created using voice recognition software and may contain grammatical errors.

## 2022-03-09 NOTE — PLAN OF CARE
DISCONTINUE ON PATHWAY REGIMEN - Mesothelioma    [id number]        Gemcitabine (Gemzar)     **Always confirm dose/schedule in your pharmacy ordering system**    REASON: Disease Progression  PRIOR TREATMENT: [id number]  TREATMENT RESPONSE: Progressive Disease (PD)    START OFF PATHWAY REGIMEN - Mesothelioma            Vinorelbine (Navelbine)     **Always confirm dose/schedule in your pharmacy ordering system**    Patient Characteristics:  Relapsed or Progressive Disease, Second Line and Beyond, Prior Immunotherapy  Therapeutic Status: Relapsed or Progressive Disease    Intent of Therapy:  Non-Curative / Palliative Intent, Discussed with Patient

## 2022-03-09 NOTE — Clinical Note
Discontinue Gemzar. Return to clinic in earliest convenience for cycle 1, day 1 of Navelbine. Patient is to see the nurse practitioner 1 week from initiation of therapy with interval CBC for day 8 . Patient is to see the nurse practitioner 2 weeks from initiation of therapy with CBC for day 15. Patient is to see me 4 weeks from initiation of therapy with interval CBC, CMP, LDH, and magnesium at which time we will begin cycle 2 of therapy.

## 2022-03-10 ENCOUNTER — TELEPHONE (OUTPATIENT)
Dept: HEMATOLOGY/ONCOLOGY | Facility: CLINIC | Age: 77
End: 2022-03-10
Payer: MEDICARE

## 2022-03-10 ENCOUNTER — TELEPHONE (OUTPATIENT)
Dept: INFUSION THERAPY | Facility: HOSPITAL | Age: 77
End: 2022-03-10
Payer: MEDICARE

## 2022-03-10 DIAGNOSIS — C45.0 MALIGNANT MESOTHELIOMA OF PLEURA: Primary | ICD-10-CM

## 2022-03-10 NOTE — NURSING
Spoke to pt.  Scheduled her for chemo school for 3/15/22 with infusion to follow.  Also scheduled 1 wk f/u with labs & infusion.  Reviewed all of this with pt.  Pt verbalized an understanding.

## 2022-03-10 NOTE — TELEPHONE ENCOUNTER
[1:11 PM] Ivy Givens    MS. Thapa #[phone] changing to Alysha.  I have her with Mae on 3/15 at 11am for class.  Can she do her treatment right after?    [1:29 PM] Marisol Martin  mrn [id number] 03/15/22 @12:30

## 2022-03-14 ENCOUNTER — DOCUMENTATION ONLY (OUTPATIENT)
Dept: PHARMACY | Facility: AMBULARY SURGERY CENTER | Age: 77
End: 2022-03-14
Payer: MEDICARE

## 2022-03-15 ENCOUNTER — TELEPHONE (OUTPATIENT)
Dept: HEMATOLOGY/ONCOLOGY | Facility: CLINIC | Age: 77
End: 2022-03-15
Payer: MEDICARE

## 2022-03-15 ENCOUNTER — CLINICAL SUPPORT (OUTPATIENT)
Dept: HEMATOLOGY/ONCOLOGY | Facility: CLINIC | Age: 77
End: 2022-03-15
Payer: MEDICARE

## 2022-03-15 ENCOUNTER — INFUSION (OUTPATIENT)
Dept: INFUSION THERAPY | Facility: HOSPITAL | Age: 77
End: 2022-03-15
Attending: INTERNAL MEDICINE
Payer: MEDICARE

## 2022-03-15 ENCOUNTER — PATIENT MESSAGE (OUTPATIENT)
Dept: HEMATOLOGY/ONCOLOGY | Facility: CLINIC | Age: 77
End: 2022-03-15
Payer: MEDICARE

## 2022-03-15 VITALS
TEMPERATURE: 97 F | RESPIRATION RATE: 16 BRPM | HEART RATE: 98 BPM | BODY MASS INDEX: 20.55 KG/M2 | WEIGHT: 123.38 LBS | DIASTOLIC BLOOD PRESSURE: 69 MMHG | HEIGHT: 65 IN | SYSTOLIC BLOOD PRESSURE: 156 MMHG | OXYGEN SATURATION: 95 %

## 2022-03-15 VITALS
SYSTOLIC BLOOD PRESSURE: 134 MMHG | DIASTOLIC BLOOD PRESSURE: 72 MMHG | RESPIRATION RATE: 16 BRPM | TEMPERATURE: 98 F | HEART RATE: 88 BPM | WEIGHT: 123.25 LBS | BODY MASS INDEX: 20.51 KG/M2

## 2022-03-15 DIAGNOSIS — C45.0 MALIGNANT MESOTHELIOMA OF PLEURA: Primary | ICD-10-CM

## 2022-03-15 PROCEDURE — 99999 PR PBB SHADOW E&M-EST. PATIENT-LVL V: CPT | Mod: PBBFAC,,,

## 2022-03-15 PROCEDURE — 25000003 PHARM REV CODE 250: Mod: PN | Performed by: INTERNAL MEDICINE

## 2022-03-15 PROCEDURE — 99999 PR PBB SHADOW E&M-EST. PATIENT-LVL V: ICD-10-PCS | Mod: PBBFAC,,,

## 2022-03-15 PROCEDURE — 96409 CHEMO IV PUSH SNGL DRUG: CPT | Mod: PN

## 2022-03-15 PROCEDURE — 63600175 PHARM REV CODE 636 W HCPCS: Mod: PN | Performed by: INTERNAL MEDICINE

## 2022-03-15 RX ORDER — SODIUM CHLORIDE 0.9 % (FLUSH) 0.9 %
10 SYRINGE (ML) INJECTION
Status: DISCONTINUED | OUTPATIENT
Start: 2022-03-15 | End: 2022-03-15 | Stop reason: HOSPADM

## 2022-03-15 RX ORDER — HEPARIN 100 UNIT/ML
500 SYRINGE INTRAVENOUS
Status: DISCONTINUED | OUTPATIENT
Start: 2022-03-15 | End: 2022-03-15 | Stop reason: HOSPADM

## 2022-03-15 RX ORDER — DEXAMETHASONE 4 MG/1
4 TABLET ORAL
Status: COMPLETED | OUTPATIENT
Start: 2022-03-15 | End: 2022-03-15

## 2022-03-15 RX ADMIN — SODIUM CHLORIDE: 0.9 INJECTION, SOLUTION INTRAVENOUS at 12:03

## 2022-03-15 RX ADMIN — DEXAMETHASONE 4 MG: 4 TABLET ORAL at 12:03

## 2022-03-15 RX ADMIN — VINORELBINE TARTRATE 40 MG: 10 INJECTION INTRAVENOUS at 01:03

## 2022-03-15 NOTE — TELEPHONE ENCOUNTER
Spoke with Yoanna trejo getting IO appt scheduled. I explained in detail what we offer and how we can help. She said that sounds interesting and asked for 3rd week of April for an appt. She voiced acceptance and said she will call if she cannot make it.

## 2022-03-15 NOTE — PROGRESS NOTES
Subjective:     Name: Yoanna Thapa  : 1945  MRN: [id number]  CC: education for chemotherapy    HPI:   Yoanna Thapa is a 76 y.o. female presents for education prior to starting Navelbine IV on days 1, 8, 15 of 28 day cycle for palliation of malignant pleural mesothelioma. She will start this regimen today.  Recent PET/CT showed progression of disease through single agent Gemzar.     PET impression from 3/07/22:    1. Detrimental change since 2021 concerning for progressive neoplastic disease as described above  2.  Extensive osseous hypermetabolic disease which could obscure underlying lesions or even diffuse osseous abnormality.  This is likely treatment related  3. High-density liver, please correlate for possible liver disease  4. A right renal stone is noted    Had no additional neurologic sx, events or weakness. She has appointment with neurology scheduled 2022  Appetite has been fair; weight stable at 123 lbs (120).   Denies fevers, chills, rigors. Denies CP, palpitations, SOB, productive cough, abdominal pain, changes in bowel habits, urinary discomfort or frequency changes. No new lumps, bumps or swelling. Denies bruising or bleeding.     Prior History:   In the Fall of  she began experiencing difficulties with breathing and SOB. Was found to have pleural effusion. 2021 effusion recurred and she underwent a pleural biopsy which revealed malignant mesothelioma.   2021 Pleurx catheter was placed  2021 Staging laparotomy done. No diaphragmatic involvement or intra-abdominal disease in association with her diagnosis of malignant mesothelioma.   03/10/2021 to 2021- Neoadjuvant Cisplatin/Alimta .Performance status too poor to allow for surgical intervention.  Had once cycle of maintenance Alimta; tolerated poorly.  21 to 11/10/21- Keytruda; PET on 21 showed progressive disease  21- 3/9/2022- Single agent Gemzar; PET/CT on 3/7/2022  showed progression of disease     Oncology History   Malignant mesothelioma of pleura   1/29/2021 Initial Diagnosis    Malignant mesothelioma of pleura     3/10/2021 - 7/26/2021 Chemotherapy    Treatment Summary   Plan Name: OP NSCLC PEMETREXED + CISPLATIN Q3W  Treatment Goal: Curative  Status: Inactive  Start Date: 3/10/2021  End Date: 7/26/2021  Provider: Presley Mcdaniel MD  Chemotherapy: CISplatin (PLATINOL) 75 mg/m2 = 124 mg in sodium chloride 0.9% 500 mL chemo infusion, 75 mg/m2 = 124 mg, Intravenous, Clinic/HOD 1 time, 4 of 4 cycles  Administration: 124 mg (3/10/2021), 124 mg (4/21/2021), 124 mg (5/12/2021), 124 mg (3/31/2021)  mannitol 25% 12.5 g in dextrose 5 % 100 mL infusion, 12.5 g, Intravenous, Clinic/HOD 1 time, 4 of 4 cycles  Administration: 12.5 g (3/10/2021), 12.5 g (3/31/2021), 12.5 g (4/21/2021), 12.5 g (5/12/2021)  PEMEtrexed (ALIMTA) 800 mg in sodium chloride 0.9% 100 mL chemo infusion, 825 mg, Intravenous, Clinic/HOD 1 time, 5 of 11 cycles  Administration: 800 mg (3/10/2021), 825 mg (4/21/2021), 800 mg (5/12/2021), 800 mg (7/26/2021), 800 mg (3/31/2021)     9/9/2021 - 11/10/2021 Chemotherapy    Treatment Summary   Plan Name: OP PEMBROLIZUMAB 200MG Q3W  Treatment Goal: Palliative  Status: Inactive  Start Date: 9/9/2021  End Date: 11/10/2021  Provider: Presley Mcdaniel MD  Chemotherapy: pembrolizumab (KEYTRUDA) 200 mg in sodium chloride 0.9% 108 mL infusion, 200 mg, Intravenous, Clinic/HOD 1 time, 4 of 6 cycles  Administration: 200 mg (9/9/2021), 200 mg (9/29/2021), 200 mg (10/20/2021), 200 mg (11/10/2021)     12/9/2021 - 2/25/2022 Chemotherapy    Treatment Summary   Plan Name: OP GEMCITABINE (1000 MG/M2) Q3W  Treatment Goal: Palliative  Status: Inactive  Start Date: 12/9/2021  End Date: 2/25/2022  Provider: Presley Mcdaniel MD  Chemotherapy: gemcitabine (GEMZAR) 1,600 mg in sodium chloride 0.9% 250 mL chemo infusion, 1,610 mg, Intravenous, Clinic/HOD 1 time, 4 of 4 cycles  Administration: 1,600  mg (12/9/2021), 1,600 mg (12/16/2021), 1,600 mg (1/6/2022), 1,600 mg (1/13/2022), 1,600 mg (1/27/2022), 1,600 mg (2/2/2022), 1,600 mg (2/17/2022), 1,600 mg (2/24/2022)     3/15/2022 -  Chemotherapy    Treatment Summary   Plan Name: OP VINORELBINE Q4W  Treatment Goal: Palliative  Status: Active  Start Date: 3/15/2022 (Planned)  End Date: 1/31/2023 (Planned)  Provider: Presley Mcdaniel MD  Chemotherapy: dexAMETHasone tablet 4 mg, 4 mg, Oral, Clinic/HOD 1 time, 0 of 12 cycles  vinorelbine (NAVELBINE) 40 mg in sodium chloride 0.9% 50 mL chemo infusion, 25 mg/m2 = 40 mg, Intravenous, Clinic/HOD 1 time, 0 of 12 cycles            Past Medical History:   Diagnosis Date    Anxiety     Breast cancer 2009    Right: Lumpectomy and XRT    Cancer 2009    breast    Cancer of pleura, secondary 1/27/2021    Depression     Disorder of kidney and ureter     GERD (gastroesophageal reflux disease)     Hyperlipidemia     Hypertension     Malignant pleural effusion 1/27/2021    Symptomatic anemia 6/15/2021       Past Surgical History:   Procedure Laterality Date    APPENDECTOMY      BACK SURGERY      BREAST LUMPECTOMY Right     COSMETIC SURGERY      DIAGNOSTIC LAPAROSCOPY N/A 2/18/2021    Procedure: LAPAROSCOPY, DIAGNOSTIC;  Surgeon: Aravind Novak MD;  Location: [facility] OR [address] [place] [address];  Service: Colon and Rectal;  Laterality: N/A;    ESOPHAGEAL DILATION N/A 5/20/2021    Procedure: DILATION, ESOPHAGUS;  Surgeon: HO Machuca MD;  Location: [facility];  Service: Endoscopy;  Laterality: N/A;    ESOPHAGOGASTRODUODENOSCOPY N/A 5/20/2021    Procedure: EGD (ESOPHAGOGASTRODUODENOSCOPY);  Surgeon: HO Machuca MD;  Location: [facility];  Service: Endoscopy;  Laterality: N/A;    HYSTERECTOMY      INSERTION OF SUBCUTANEOUS PORT      INSERTION OF TUNNELED CENTRAL VENOUS CATHETER (CVC) WITH SUBCUTANEOUS PORT  2/18/2021    Procedure: INSERTION, PORT-A-CATH;  Surgeon: Aravind Novak MD;  Location: [facility] OR [address] [place] [address];   Service: Colon and Rectal;;    SPINE SURGERY      [place]ACENTESIS Right 12/28/2020    [place]    THORACENTESIS Right 01/24/2021    [facility]    TUBE THORACOTOMY Right 2/1/2021    Procedure: INSERTION, CATHETER, INTERCOSTAL, FOR DRAINAGE;  Surgeon: Esdras Jarrett MD;  Location: [facility] OR;  Service: Cardiothoracic;  Laterality: Right;    VIDEO-ASSISTED THORACOSCOPIC SURGERY (VATS) Right 1/27/2021    Procedure: VATS (VIDEO-ASSISTED THORACOSCOPIC SURGERY) - Pleural biopsy ;  Surgeon: Esdras Jarrett MD;  Location: [facility] OR;  Service: Cardiovascular;  Laterality: Right;       No family history on file.    Social History     Socioeconomic History    Marital status:     Number of children: 1   Occupational History    Occupation: retired   Tobacco Use    Smoking status: Never Smoker    Smokeless tobacco: Never Used   Substance and Sexual Activity    Alcohol use: Not Currently     Comment: occasionally    Drug use: Never    Sexual activity: Yes     Partners: Male     Social Determinants of Health     Financial Resource Strain: Low Risk     Difficulty of Paying Living Expenses: Not hard at all   Food Insecurity: No Food Insecurity    Worried About Running Out of Food in the Last Year: Never true    Ran Out of Food in the Last Year: Never true   Transportation Needs: No Transportation Needs    Lack of Transportation (Medical): No    Lack of Transportation (Non-Medical): No   Physical Activity: Insufficiently Active    Days of Exercise per Week: 5 days    Minutes of Exercise per Session: 20 min   Stress: Stress Concern Present    Feeling of Stress : To some extent   Social Connections: Moderately Integrated    Frequency of Communication with Friends and Family: More than three times a week    Frequency of Social Gatherings with Friends and Family: More than three times a week    Attends Je[employer] witness Services: More than 4 times per year    Active Member of Clubs or Organizations: No    Attends Club  "or Organization Meetings: Never    Marital Status:    Housing Stability: Low Risk     Unable to Pay for Housing in the Last Year: No    Number of Places Lived in the Last Year: 1    Unstable Housing in the Last Year: No       Review of patient's allergies indicates:   Allergen Reactions    Iodine and iodide containing products Anaphylaxis and Swelling     Dye causes Blisters.  Pt tolerates shrimp and betadine.    Sulfa (sulfonamide antibiotics) Anaphylaxis and Hives    Adhesive Other (See Comments)     Blister    Blisters   Pt request paper tape.    Iodinated contrast media Blisters       Review of Systems   Constitutional: Negative for chills, decreased appetite and fever.   Eyes: Negative for visual disturbance.   Cardiovascular: Negative for chest pain.   Respiratory: Positive for shortness of breath (with exertion ). Negative for sputum production.    Hematologic/Lymphatic: Negative for adenopathy. Does not bruise/bleed easily.   Skin: Negative for itching and rash.   Musculoskeletal: Negative for arthritis and myalgias.   Gastrointestinal: Positive for constipation. Negative for change in bowel habit, diarrhea, hematochezia, nausea and vomiting.   Genitourinary: Negative for dysuria and hematuria.   Neurological: Negative for headaches and light-headedness.            Objective:     Vitals:    03/15/22 1046   BP: (!) 156/69   BP Location: Left arm   Patient Position: Sitting   BP Method: Medium (Automatic)   Pulse: 98   Resp: 16   Temp: 97.4 °F (36.3 °C)   TempSrc: Temporal   SpO2: 95%   Weight: 56 kg (123 lb 5.6 oz)   Height: 5' 5" (1.651 m)        Physical Exam  Vitals reviewed.   Constitutional:       General: She is not in acute distress.     Appearance: She is not diaphoretic.   HENT:      Head: Normocephalic and atraumatic.   Cardiovascular:      Rate and Rhythm: Normal rate and regular rhythm.      Heart sounds: Normal heart sounds.   Pulmonary:      Effort: Pulmonary effort is normal. No " respiratory distress.      Breath sounds: Wheezing present.   Abdominal:      General: There is no distension.   Musculoskeletal:      Right lower leg: No edema.      Left lower leg: No edema.   Skin:     General: Skin is warm and dry.      Findings: No bruising or rash.   Neurological:      Mental Status: She is alert and oriented to person, place, and time.      Gait: Gait normal.   Psychiatric:         Mood and Affect: Mood normal.         Behavior: Behavior normal.         Judgment: Judgment normal.             Current Outpatient Medications on File Prior to Visit   Medication Sig    acetaminophen (TYLENOL) 500 MG tablet Take 1,000 mg by mouth every 6 (six) hours as needed for Pain.    albuterol (PROVENTIL/VENTOLIN HFA) 90 mcg/actuation inhaler Inhale 2 puffs into the lungs every 6 (six) hours as needed for Wheezing. Rescue    ALPRAZolam (XANAX) 0.25 MG tablet Take 1 tablet (0.25 mg total) by mouth 3 (three) times daily as needed for Anxiety.    amLODIPine (NORVASC) 5 MG tablet Take 1 tablet (5 mg total) by mouth once daily.    ascorbic acid, vitamin C, (VITAMIN C) 500 MG tablet Take 500 mg by mouth every morning.    atorvastatin (LIPITOR) 10 MG tablet Take 10 mg by mouth every morning.     cyclobenzaprine (FLEXERIL) 10 MG tablet Take 10 mg by mouth daily as needed.     docusate sodium (COLACE) 100 MG capsule Take 100 mg by mouth 2 (two) times daily as needed for Constipation.    HYDROcodone-acetaminophen (NORCO) 5-325 mg per tablet Take 1 tablet by mouth every 6 (six) hours as needed for Pain.    LIDOcaine-prilocaine (EMLA) cream Apply topically as needed.    loratadine (CLARITIN) 10 mg tablet Take 1 tablet by mouth once daily.    losartan (COZAAR) 100 MG tablet Take 100 mg by mouth every morning.     megestroL (MEGACE ES) 625 mg/5 mL (125 mg/mL) Susp Take 5 mLs (625 mg total) by mouth once daily.    megestroL (MEGACE) 400 mg/10 mL (40 mg/mL) Susp Take 200 mg by mouth once daily.    multivitamin  capsule Take 1 capsule by mouth once daily.    ondansetron (ZOFRAN-ODT) 4 MG TbDL 4 mg every 6 (six) hours.    pantoprazole (PROTONIX) 40 MG tablet Take 40 mg by mouth every morning.     paroxetine (PAXIL) 20 MG tablet Take 1 tablet by mouth every morning.    potassium chloride (KLOR-CON) 10 MEQ TbSR TAKE 1 TABLET (10 MEQ) BY MOUTH 1 TIME A DAY    potassium chloride SA (K-DUR,KLOR-CON) 20 MEQ tablet Take 1 tablet (20 mEq total) by mouth 2 (two) times daily. (Patient taking differently: Take 20 mEq by mouth once daily.)    triamcinolone acetonide 0.025% (KENALOG) 0.025 % cream Apply topically 2 (two) times daily.    triamcinolone acetonide 0.025% (KENALOG) 0.025 % Oint Apply topically 2 (two) times daily. Apply to affected area twice daily    venlafaxine (EFFEXOR XR) 75 MG 24 hr capsule Take 2 capsules (150 mg total) by mouth once daily.    water Liqd 150 mL with MILK OF MAGNESIA 400 mg/5 mL Susp 400 mg, diphenhydrAMINE 12.5 mg/5 mL Elix 60 mg, nystatin 100,000 unit/mL Susp 500,000 Units SWISH & SWALLOW 5 ML'S EVERY 4 HOURS AS NEEDED FOR MOUTH & THROAT PAIN    folic acid (FOLVITE) 1 MG tablet Take 1 tablet (1 mg total) by mouth once daily.    gabapentin (NEURONTIN) 300 MG capsule Take 1 capsule (300 mg total) by mouth 2 (two) times daily.     Current Facility-Administered Medications on File Prior to Visit   Medication    ceFAZolin injection 2 g       CBC:  Lab Results   Component Value Date    WBC 20.56 (H) 03/07/2022    HGB 10.5 (L) 03/07/2022    HCT 32.4 (L) 03/07/2022     (H) 03/07/2022     03/07/2022         CMP:  Sodium   Date Value Ref Range Status   03/07/2022 139 136 - 145 mmol/L Final     Potassium   Date Value Ref Range Status   03/07/2022 5.0 3.5 - 5.1 mmol/L Final     Chloride   Date Value Ref Range Status   03/07/2022 104 95 - 110 mmol/L Final     CO2   Date Value Ref Range Status   03/07/2022 23 23 - 29 mmol/L Final     Glucose   Date Value Ref Range Status   03/07/2022 97 70  - 110 mg/dL Final     BUN   Date Value Ref Range Status   03/07/2022 23 8 - 23 mg/dL Final     Creatinine   Date Value Ref Range Status   03/07/2022 1.1 0.5 - 1.4 mg/dL Final     Calcium   Date Value Ref Range Status   03/07/2022 9.7 8.7 - 10.5 mg/dL Final     Total Protein   Date Value Ref Range Status   03/07/2022 8.0 6.0 - 8.4 g/dL Final     Albumin   Date Value Ref Range Status   03/07/2022 3.6 3.5 - 5.2 g/dL Final     Total Bilirubin   Date Value Ref Range Status   03/07/2022 0.3 0.1 - 1.0 mg/dL Final     Comment:     For infants and newborns, interpretation of results should be based  on gestational age, weight and in agreement with clinical  observations.    Premature Infant recommended reference ranges:  Up to 24 hours.............<8.0 mg/dL  Up to 48 hours............<12.0 mg/dL  3-5 days..................<15.0 mg/dL  6-29 days.................<15.0 mg/dL       Alkaline Phosphatase   Date Value Ref Range Status   03/07/2022 272 (H) 55 - 135 U/L Final     AST   Date Value Ref Range Status   03/07/2022 20 10 - 40 U/L Final     ALT   Date Value Ref Range Status   03/07/2022 23 10 - 44 U/L Final     Anion Gap   Date Value Ref Range Status   03/07/2022 12 8 - 16 mmol/L Final     eGFR if    Date Value Ref Range Status   03/07/2022 57 (A) >60 mL/min/1.73 m^2 Final     eGFR if non    Date Value Ref Range Status   03/07/2022 49 (A) >60 mL/min/1.73 m^2 Final     Comment:     Calculation used to obtain the estimated glomerular filtration  rate (eGFR) is the CKD-EPI equation.          CT/PET SCAN ROUTINE  Narrative: EXAMINATION:  PET-CT fusion    TECHNIQUE:  PET CT fusion imaging was obtained from the level of the skull base to the level of the proximal thighs with utilization of 10.4mCi of FDG glucose.  A CT was performed for attenuation correction and anatomical correlation alone.  The left antecubital fossa was the injection site    COMPARISON:  11/29/2021    FINDINGS:  Head and  neck:    A markedly hypermetabolic right parotid mass is noted image seventeen sequence 603 with an SUV to 54.5 slightly less than on the prior with a measurement of 12 by 8 mm image 18 unchanged when measured in a similar manner.    A heterogeneous irregular thyroid gland is noted with an enlarged right lobe in comparison with the left similar to on the prior.  Hypermetabolic activity is noted at the lower pole of the left lobe of the thyroid gland in particular within SUV of 3.2.  This appears similar to on the prior.  Correlation with ultrasound may be of use.  Underlying lesion is not excluded.    Thorax:    A new focus of hypermetabolic activity is noted along right at the lung apex image 58 since the prior    A pretracheal image 67 and paratracheal focus of hypermetabolic activity that appear to have been present on the prior.    New hypermetabolic activity is noted in a pre cardial location image 75 as well as anterior to the right mainstem bronchus image 75 since the prior.  Paraesophageal hypermetabolic activity is noted progressed since the prior along the pleural surface on the right.    At the mid right hemithorax image 68 hypermetabolic activity is noted along the pleura to 12.0 similar to on the prior    Subcarinal hypermetabolic activity is noted image 86 with an SUV of 9.9 slightly elevated since the prior a measurement of 17 by 17 mm is noted slightly larger than on the prior where a measurement of 16 x 12 mm is noted in this region    Abdomen and pelvis:    No new foci of hypermetabolic activity are noted within the abdomen and pelvis.  A nonobstructive right renal stone appears to be present of 7 mm.    Increased liver density is noted as can be seen with liver disease, amiodarone exposure, glycogen storage diseases.  Please clinically correlate.    Musculoskeletal:    Extensive osseous hypermetabolic activity is noted with focal regions identified.  This appears diffuse which favors post  treatment change but this could easily obscure underlying disease or even diffuse osseous metastatic disease  Impression: 1. Detrimental change since 11/29/2021 concerning for progressive neoplastic disease as described above  2.  Extensive osseous hypermetabolic disease which could obscure underlying lesions or even diffuse osseous abnormality.  This is likely treatment related  3. High-density liver, please correlate for possible liver disease  4. A right renal stone is noted    Electronically signed by: Andres Niño MD  Date:    03/07/2022  Time:    15:28       All pertinent labs and imaging reviewed.    Assessment:       1. Malignant mesothelioma of pleura         Plan:   Malignant mesothelioma of pleura  -Now with disease progression through treatment with single agent Gemzar  -Plan to begin Navelbine 25mg/m2 on days 1,8,15 of 28 day cycle today. Will receive PO dexamethasone 4 mg po prior to each treatment. Regimen and schedule discussed with patient.   - Handouts provided from [facility] on Navelbine and Dexamethasone.    -Discussed the mechanism of action, potential side effects of this treatment as well as ways to manage them at home. Some of these side effects include but not limited to fever, nausea, vomiting, decreased appetite, fatigue, weakness, cytopenias, myalgia/arthralgia, constipation, diarrhea, bleeding, headache, taste change, hair thinning/loss, peripheral neuropathy, kidney toxicity..   - Dietary modifications discussed. Has met with dietician in the past. No changes noted at this time.   - Chemotherapy Binder supplied with contact information.   - Discussed follow-up with the physician for toxicity monitoring throughout treatment.    - No refill requests at this time; she has Ondansetron 4mg tabs to be taken every 6 hours PRN for nausea. She has EMLA cream at home for use on port site prior to access.   -The patient verbalized understanding. All questions were answered and an informed  consent was obtained.  -Patient would like to sign up for Chemo ; order set completed today     Follow-up:  Follow up with me in one week for C1D8 and in 2 weeks for C1D15. CBC, CMP to be done prior to each visit.   Follow-up with Dr. Mcdaniel on 4/11/22 for C2 D1 with CBC, CMP, LDH, Mg prior to that visit.    Patient queried and all questions were answered.  Assessment/Plan reviewed and approved by Dr. Mcdaniel.

## 2022-03-15 NOTE — Clinical Note
Proceed with Cycle 1 day 1 Navelbine (previously cleared by Dr. Mcdaniel) Follow-up with me in one week for C1D8 with CBC and CMP prior to visit Patient would like to sign up for chemo -order entered

## 2022-03-22 ENCOUNTER — INFUSION (OUTPATIENT)
Dept: INFUSION THERAPY | Facility: HOSPITAL | Age: 77
End: 2022-03-22
Attending: INTERNAL MEDICINE
Payer: MEDICARE

## 2022-03-22 ENCOUNTER — OFFICE VISIT (OUTPATIENT)
Dept: HEMATOLOGY/ONCOLOGY | Facility: CLINIC | Age: 77
End: 2022-03-22
Payer: MEDICARE

## 2022-03-22 VITALS
TEMPERATURE: 98 F | HEIGHT: 65 IN | HEART RATE: 106 BPM | OXYGEN SATURATION: 95 % | SYSTOLIC BLOOD PRESSURE: 138 MMHG | BODY MASS INDEX: 20.64 KG/M2 | WEIGHT: 123.88 LBS | DIASTOLIC BLOOD PRESSURE: 80 MMHG

## 2022-03-22 VITALS
HEIGHT: 65 IN | SYSTOLIC BLOOD PRESSURE: 163 MMHG | WEIGHT: 123.88 LBS | OXYGEN SATURATION: 95 % | RESPIRATION RATE: 16 BRPM | TEMPERATURE: 98 F | BODY MASS INDEX: 20.64 KG/M2 | HEART RATE: 93 BPM | DIASTOLIC BLOOD PRESSURE: 65 MMHG

## 2022-03-22 DIAGNOSIS — T45.1X5A ANTINEOPLASTIC CHEMOTHERAPY INDUCED ANEMIA: ICD-10-CM

## 2022-03-22 DIAGNOSIS — G89.3 NEOPLASM RELATED PAIN: ICD-10-CM

## 2022-03-22 DIAGNOSIS — C45.0 MALIGNANT MESOTHELIOMA OF PLEURA: Primary | ICD-10-CM

## 2022-03-22 DIAGNOSIS — D64.81 ANTINEOPLASTIC CHEMOTHERAPY INDUCED ANEMIA: ICD-10-CM

## 2022-03-22 PROCEDURE — 1126F PR PAIN SEVERITY QUANTIFIED, NO PAIN PRESENT: ICD-10-PCS | Mod: CPTII,S$GLB,,

## 2022-03-22 PROCEDURE — A4216 STERILE WATER/SALINE, 10 ML: HCPCS | Mod: PN

## 2022-03-22 PROCEDURE — 99214 OFFICE O/P EST MOD 30 MIN: CPT | Mod: S$GLB,,,

## 2022-03-22 PROCEDURE — 25000003 PHARM REV CODE 250: Mod: PN

## 2022-03-22 PROCEDURE — 3079F DIAST BP 80-89 MM HG: CPT | Mod: CPTII,S$GLB,,

## 2022-03-22 PROCEDURE — 3288F PR FALLS RISK ASSESSMENT DOCUMENTED: ICD-10-PCS | Mod: CPTII,S$GLB,,

## 2022-03-22 PROCEDURE — 1126F AMNT PAIN NOTED NONE PRSNT: CPT | Mod: CPTII,S$GLB,,

## 2022-03-22 PROCEDURE — 96409 CHEMO IV PUSH SNGL DRUG: CPT | Mod: PN

## 2022-03-22 PROCEDURE — 99214 PR OFFICE/OUTPT VISIT, EST, LEVL IV, 30-39 MIN: ICD-10-PCS | Mod: S$GLB,,,

## 2022-03-22 PROCEDURE — 3075F PR MOST RECENT SYSTOLIC BLOOD PRESS GE 130-139MM HG: ICD-10-PCS | Mod: CPTII,S$GLB,,

## 2022-03-22 PROCEDURE — 3079F PR MOST RECENT DIASTOLIC BLOOD PRESSURE 80-89 MM HG: ICD-10-PCS | Mod: CPTII,S$GLB,,

## 2022-03-22 PROCEDURE — 63600175 PHARM REV CODE 636 W HCPCS: Mod: PN

## 2022-03-22 PROCEDURE — 3075F SYST BP GE 130 - 139MM HG: CPT | Mod: CPTII,S$GLB,,

## 2022-03-22 PROCEDURE — 3288F FALL RISK ASSESSMENT DOCD: CPT | Mod: CPTII,S$GLB,,

## 2022-03-22 PROCEDURE — 99999 PR PBB SHADOW E&M-EST. PATIENT-LVL IV: CPT | Mod: PBBFAC,,,

## 2022-03-22 PROCEDURE — 1101F PR PT FALLS ASSESS DOC 0-1 FALLS W/OUT INJ PAST YR: ICD-10-PCS | Mod: CPTII,S$GLB,,

## 2022-03-22 PROCEDURE — 1101F PT FALLS ASSESS-DOCD LE1/YR: CPT | Mod: CPTII,S$GLB,,

## 2022-03-22 PROCEDURE — 99999 PR PBB SHADOW E&M-EST. PATIENT-LVL IV: ICD-10-PCS | Mod: PBBFAC,,,

## 2022-03-22 RX ORDER — HEPARIN 100 UNIT/ML
500 SYRINGE INTRAVENOUS
Status: CANCELLED | OUTPATIENT
Start: 2022-03-22

## 2022-03-22 RX ORDER — DEXAMETHASONE 4 MG/1
4 TABLET ORAL
Status: CANCELLED | OUTPATIENT
Start: 2022-03-22

## 2022-03-22 RX ORDER — DEXAMETHASONE 4 MG/1
4 TABLET ORAL
Status: COMPLETED | OUTPATIENT
Start: 2022-03-22 | End: 2022-03-22

## 2022-03-22 RX ORDER — SODIUM CHLORIDE 0.9 % (FLUSH) 0.9 %
10 SYRINGE (ML) INJECTION
Status: CANCELLED | OUTPATIENT
Start: 2022-03-22

## 2022-03-22 RX ORDER — HEPARIN 100 UNIT/ML
500 SYRINGE INTRAVENOUS
Status: DISCONTINUED | OUTPATIENT
Start: 2022-03-22 | End: 2022-03-22 | Stop reason: HOSPADM

## 2022-03-22 RX ORDER — SODIUM CHLORIDE 0.9 % (FLUSH) 0.9 %
10 SYRINGE (ML) INJECTION
Status: DISCONTINUED | OUTPATIENT
Start: 2022-03-22 | End: 2022-03-22 | Stop reason: HOSPADM

## 2022-03-22 RX ADMIN — Medication 500 UNITS: at 01:03

## 2022-03-22 RX ADMIN — DEXAMETHASONE 4 MG: 4 TABLET ORAL at 12:03

## 2022-03-22 RX ADMIN — Medication 10 ML: at 01:03

## 2022-03-22 RX ADMIN — SODIUM CHLORIDE: 9 INJECTION, SOLUTION INTRAVENOUS at 12:03

## 2022-03-22 RX ADMIN — VINORELBINE TARTRATE 40 MG: 10 INJECTION INTRAVENOUS at 12:03

## 2022-03-22 NOTE — Clinical Note
Proceed with C1D8 Alysha today Follow-up with me in one week with CBC prior to appointment and for C1 D15 Alysha

## 2022-03-22 NOTE — PROGRESS NOTES
Subjective:     Name: Yoanna Thapa  : 1945  MRN: [id number]    Chief Complaint: Follow-up, mesothelioma    HPI:   Yoanna Thapa is a 76 y.o. female known to Dr. Mcdaniel who presents for evaluation of pleural mesothelioma and consideration of C1 D8 Navelbine . She is accompanied by her .    PET/CT on 3/7/2022 showed progression of disease through single agent Gemzar. Began Navelbine on 3/15/2022.     Patient has noticed some intermittent pain in her right lower rib and back over the past few weeks.This pain improves with use of Tylenol. Occasional feelings of light-headedness over the past week. No weakness, facial drooping, problems with coordination. Appetite has been good; weight stable at 123 lbs.   Denies fevers, chills, rigors. Denies CP, palpitations, SOB, productive cough, abdominal pain, changes in bowel habits, urinary discomfort or frequency changes. No new lumps, bumps or swelling. Denies bruising or bleeding.     Prior History:   In the Fall of  she began experiencing difficulties with breathing and SOB. Was found to have pleural effusion. 2021 effusion recurred and she underwent a pleural biopsy which revealed malignant mesothelioma.   2021 Pleurx catheter was placed  2021 Staging laparotomy done. No diaphragmatic involvement or intra-abdominal disease in association with her diagnosis of malignant mesothelioma.   03/10/2021 to 2021- Neoadjuvant Cisplatin/Alimta .Performance status too poor to allow for surgical intervention.  Had once cycle of maintenance Alimta; tolerated poorly.  21 to 11/10/21- Keytruda; PET on 21 showed progressive disease  21 to 2022- Single agent Gemzar. PET/CT on 3/07/2022 shows progression of disease.  3/15/2022- Began Navelbine on D1, 8, 15 of a 28 day cycle     Past Medical History:   Diagnosis Date    Anxiety     Breast cancer     Right: Lumpectomy and XRT    Cancer 2009    breast     Cancer of pleura, secondary 1/27/2021    Depression     Disorder of kidney and ureter     GERD (gastroesophageal reflux disease)     Hyperlipidemia     Hypertension     Malignant pleural effusion 1/27/2021    Symptomatic anemia 6/15/2021       Past Surgical History:   Procedure Laterality Date    APPENDECTOMY      BACK SURGERY      BREAST LUMPECTOMY Right     COSMETIC SURGERY      DIAGNOSTIC LAPAROSCOPY N/A 2/18/2021    Procedure: LAPAROSCOPY, DIAGNOSTIC;  Surgeon: Aravind Novak MD;  Location: [facility] OR 2ND FLR;  Service: Colon and Rectal;  Laterality: N/A;    ESOPHAGEAL DILATION N/A 5/20/2021    Procedure: DILATION, ESOPHAGUS;  Surgeon: HO Machuca MD;  Location: [facility] ENDO;  Service: Endoscopy;  Laterality: N/A;    ESOPHAGOGASTRODUODENOSCOPY N/A 5/20/2021    Procedure: EGD (ESOPHAGOGASTRODUODENOSCOPY);  Surgeon: HO Machuca MD;  Location: [facility] ENDO;  Service: Endoscopy;  Laterality: N/A;    HYSTERECTOMY      INSERTION OF SUBCUTANEOUS PORT      INSERTION OF TUNNELED CENTRAL VENOUS CATHETER (CVC) WITH SUBCUTANEOUS PORT  2/18/2021    Procedure: INSERTION, PORT-A-CATH;  Surgeon: Aravind Novak MD;  Location: [facility] OR 2ND FLR;  Service: Colon and Rectal;;    SPINE SURGERY      THORACENTESIS Right 12/28/2020    [place]    THORACENTESIS Right 01/24/2021    [facility]    TUBE THORACOTOMY Right 2/1/2021    Procedure: INSERTION, CATHETER, INTERCOSTAL, FOR DRAINAGE;  Surgeon: sEdras Jarrett MD;  Location: [facility] OR;  Service: Cardiothoracic;  Laterality: Right;    VIDEO-ASSISTED THORACOSCOPIC SURGERY (VATS) Right 1/27/2021    Procedure: VATS (VIDEO-ASSISTED THORACOSCOPIC SURGERY) - Pleural biopsy ;  Surgeon: Esdras Jarrett MD;  Location: [facility] OR;  Service: Cardiovascular;  Laterality: Right;           Review of patient's allergies indicates:   Allergen Reactions    Iodine and iodide containing products Anaphylaxis and Swelling     Dye causes Blisters.  Pt tolerates shrimp  "and betadine.    Sulfa (sulfonamide antibiotics) Anaphylaxis and Hives    Adhesive Other (See Comments)     Blister    Blisters   Pt request paper tape.    Iodinated contrast media Blisters       Review of Systems   Constitutional: Negative for chills, decreased appetite, diaphoresis, fever, malaise/fatigue and night sweats.   HENT: Negative for congestion and sore throat.    Eyes: Negative for visual disturbance.   Cardiovascular: Negative for chest pain, dyspnea on exertion, leg swelling and palpitations.   Respiratory: Positive for wheezing (at night). Negative for cough and shortness of breath.    Hematologic/Lymphatic: Negative for adenopathy and bleeding problem. Does not bruise/bleed easily.   Skin: Negative for dry skin, itching and rash.   Musculoskeletal: Negative for joint pain, joint swelling and myalgias.   Gastrointestinal: Negative for abdominal pain, constipation, diarrhea, heartburn, hematochezia, nausea and vomiting.   Genitourinary: Negative for dysuria, frequency, hematuria and urgency.   Neurological: Positive for light-headedness. Negative for headaches, numbness, paresthesias and weakness.       Objective:     Vitals:    03/22/22 1132   BP: 138/80   BP Location: Right arm   Patient Position: Sitting   BP Method: Medium (Manual)   Pulse: 106   Temp: 97.7 °F (36.5 °C)   TempSrc: Temporal   SpO2: 95%   Weight: 56.2 kg (123 lb 14.4 oz)   Height: 5' 5" (1.651 m)        Physical Exam  Vitals reviewed.   Constitutional:       General: She is not in acute distress.     Appearance: She is not diaphoretic.   HENT:      Head: Normocephalic and atraumatic.      Mouth/Throat:      Mouth: Mucous membranes are moist.      Pharynx: No oropharyngeal exudate or posterior oropharyngeal erythema.   Eyes:      General: No scleral icterus.     Pupils: Pupils are equal, round, and reactive to light.   Cardiovascular:      Rate and Rhythm: Normal rate and regular rhythm.      Heart sounds: No murmur heard.    No " friction rub. No gallop.   Pulmonary:      Effort: Pulmonary effort is normal. No respiratory distress.      Breath sounds: Wheezing present. No rhonchi or rales.   Abdominal:      General: Bowel sounds are normal.      Palpations: There is no mass.      Tenderness: There is no abdominal tenderness. There is no guarding.   Musculoskeletal:         General: No swelling.      Cervical back: No tenderness.      Right lower leg: No edema.      Left lower leg: No edema.   Lymphadenopathy:      Cervical: No cervical adenopathy.   Skin:     General: Skin is warm and dry.      Findings: No bruising, erythema or rash.   Neurological:      Mental Status: She is alert and oriented to person, place, and time.      Motor: No weakness.      Coordination: Coordination normal.      Gait: Gait normal.   Psychiatric:         Mood and Affect: Mood normal.         Behavior: Behavior normal.         Judgment: Judgment normal.         Current Outpatient Medications on File Prior to Visit   Medication Sig    acetaminophen (TYLENOL) 500 MG tablet Take 1,000 mg by mouth every 6 (six) hours as needed for Pain.    albuterol (PROVENTIL/VENTOLIN HFA) 90 mcg/actuation inhaler Inhale 2 puffs into the lungs every 6 (six) hours as needed for Wheezing. Rescue    ALPRAZolam (XANAX) 0.25 MG tablet Take 1 tablet (0.25 mg total) by mouth 3 (three) times daily as needed for Anxiety.    amLODIPine (NORVASC) 5 MG tablet Take 1 tablet (5 mg total) by mouth once daily.    ascorbic acid, vitamin C, (VITAMIN C) 500 MG tablet Take 500 mg by mouth every morning.    atorvastatin (LIPITOR) 10 MG tablet Take 10 mg by mouth every morning.     cyclobenzaprine (FLEXERIL) 10 MG tablet Take 10 mg by mouth daily as needed.     docusate sodium (COLACE) 100 MG capsule Take 100 mg by mouth 2 (two) times daily as needed for Constipation.    HYDROcodone-acetaminophen (NORCO) 5-325 mg per tablet Take 1 tablet by mouth every 6 (six) hours as needed for Pain.     LIDOcaine-prilocaine (EMLA) cream Apply topically as needed.    loratadine (CLARITIN) 10 mg tablet Take 1 tablet by mouth once daily.    losartan (COZAAR) 100 MG tablet Take 100 mg by mouth every morning.     megestroL (MEGACE ES) 625 mg/5 mL (125 mg/mL) Susp Take 5 mLs (625 mg total) by mouth once daily.    megestroL (MEGACE) 400 mg/10 mL (40 mg/mL) Susp Take 200 mg by mouth once daily.    multivitamin capsule Take 1 capsule by mouth once daily.    ondansetron (ZOFRAN-ODT) 4 MG TbDL 4 mg every 6 (six) hours.    pantoprazole (PROTONIX) 40 MG tablet Take 40 mg by mouth every morning.     paroxetine (PAXIL) 20 MG tablet Take 1 tablet by mouth every morning.    potassium chloride (KLOR-CON) 10 MEQ TbSR TAKE 1 TABLET (10 MEQ) BY MOUTH 1 TIME A DAY    potassium chloride SA (K-DUR,KLOR-CON) 20 MEQ tablet Take 1 tablet (20 mEq total) by mouth 2 (two) times daily. (Patient taking differently: Take 20 mEq by mouth once daily.)    triamcinolone acetonide 0.025% (KENALOG) 0.025 % cream Apply topically 2 (two) times daily.    triamcinolone acetonide 0.025% (KENALOG) 0.025 % Oint Apply topically 2 (two) times daily. Apply to affected area twice daily    venlafaxine (EFFEXOR XR) 75 MG 24 hr capsule Take 2 capsules (150 mg total) by mouth once daily.    water Liqd 150 mL with MILK OF MAGNESIA 400 mg/5 mL Susp 400 mg, diphenhydrAMINE 12.5 mg/5 mL Elix 60 mg, nystatin 100,000 unit/mL Susp 500,000 Units SWISH & SWALLOW 5 ML'S EVERY 4 HOURS AS NEEDED FOR MOUTH & THROAT PAIN    folic acid (FOLVITE) 1 MG tablet Take 1 tablet (1 mg total) by mouth once daily.    gabapentin (NEURONTIN) 300 MG capsule Take 1 capsule (300 mg total) by mouth 2 (two) times daily.     Current Facility-Administered Medications on File Prior to Visit   Medication    ceFAZolin injection 2 g       CBC:  Lab Results   Component Value Date    WBC 9.22 03/22/2022    HGB 10.9 (L) 03/22/2022    HCT 33.8 (L) 03/22/2022     (H) 03/22/2022    PLT  384 03/22/2022         CMP:  Sodium   Date Value Ref Range Status   03/07/2022 139 136 - 145 mmol/L Final     Potassium   Date Value Ref Range Status   03/07/2022 5.0 3.5 - 5.1 mmol/L Final     Chloride   Date Value Ref Range Status   03/07/2022 104 95 - 110 mmol/L Final     CO2   Date Value Ref Range Status   03/07/2022 23 23 - 29 mmol/L Final     Glucose   Date Value Ref Range Status   03/07/2022 97 70 - 110 mg/dL Final     BUN   Date Value Ref Range Status   03/07/2022 23 8 - 23 mg/dL Final     Creatinine   Date Value Ref Range Status   03/07/2022 1.1 0.5 - 1.4 mg/dL Final     Calcium   Date Value Ref Range Status   03/07/2022 9.7 8.7 - 10.5 mg/dL Final     Total Protein   Date Value Ref Range Status   03/07/2022 8.0 6.0 - 8.4 g/dL Final     Albumin   Date Value Ref Range Status   03/07/2022 3.6 3.5 - 5.2 g/dL Final     Total Bilirubin   Date Value Ref Range Status   03/07/2022 0.3 0.1 - 1.0 mg/dL Final     Comment:     For infants and newborns, interpretation of results should be based  on gestational age, weight and in agreement with clinical  observations.    Premature Infant recommended reference ranges:  Up to 24 hours.............<8.0 mg/dL  Up to 48 hours............<12.0 mg/dL  3-5 days..................<15.0 mg/dL  6-29 days.................<15.0 mg/dL       Alkaline Phosphatase   Date Value Ref Range Status   03/07/2022 272 (H) 55 - 135 U/L Final     AST   Date Value Ref Range Status   03/07/2022 20 10 - 40 U/L Final     ALT   Date Value Ref Range Status   03/07/2022 23 10 - 44 U/L Final     Anion Gap   Date Value Ref Range Status   03/07/2022 12 8 - 16 mmol/L Final     eGFR if    Date Value Ref Range Status   03/07/2022 57 (A) >60 mL/min/1.73 m^2 Final     eGFR if non    Date Value Ref Range Status   03/07/2022 49 (A) >60 mL/min/1.73 m^2 Final     Comment:     Calculation used to obtain the estimated glomerular filtration  rate (eGFR) is the CKD-EPI equation.           Assessment:       1. Malignant mesothelioma of pleura    2. Antineoplastic chemotherapy induced anemia    3. Neoplasm related pain         Plan:   Malignant mesothelioma of pleura  -Proceed with Navelbine C1 D8 today as planned   -Follow up in 1 week for next cycle and labs     Anemia   -Chemotherapy induced anemia  -Last received Retacrit 09/2021  -Will continue to hold Retacrit in light of her recent TIA, counts have been stable  -Hbg today 10.9 g/dL; improved from last visit    Pain  -Will restart Gabapentin once at night  -Continue Tylenol PRN  -Will monitor     Follow-up:    Follow-up with me on 3/29/22 for consideration of C1 D15 of Navelbine. CBC, prior to the visit.    Patient queried and all questions were answered.

## 2022-03-22 NOTE — PLAN OF CARE
Pt tolerated Navelbine infusion well.  No s/s of infusion reaction noted.  Instructed to call MD with any problems.

## 2022-03-29 ENCOUNTER — INFUSION (OUTPATIENT)
Dept: INFUSION THERAPY | Facility: HOSPITAL | Age: 77
End: 2022-03-29
Attending: INTERNAL MEDICINE
Payer: MEDICARE

## 2022-03-29 ENCOUNTER — OFFICE VISIT (OUTPATIENT)
Dept: HEMATOLOGY/ONCOLOGY | Facility: CLINIC | Age: 77
End: 2022-03-29
Payer: MEDICARE

## 2022-03-29 VITALS
DIASTOLIC BLOOD PRESSURE: 80 MMHG | SYSTOLIC BLOOD PRESSURE: 134 MMHG | HEART RATE: 104 BPM | BODY MASS INDEX: 20.46 KG/M2 | WEIGHT: 122.81 LBS | HEIGHT: 65 IN | OXYGEN SATURATION: 100 % | TEMPERATURE: 97 F

## 2022-03-29 VITALS
HEIGHT: 65 IN | TEMPERATURE: 97 F | SYSTOLIC BLOOD PRESSURE: 149 MMHG | OXYGEN SATURATION: 100 % | DIASTOLIC BLOOD PRESSURE: 75 MMHG | WEIGHT: 122.81 LBS | RESPIRATION RATE: 16 BRPM | BODY MASS INDEX: 20.46 KG/M2 | HEART RATE: 95 BPM

## 2022-03-29 DIAGNOSIS — T45.1X5A ANTINEOPLASTIC CHEMOTHERAPY INDUCED ANEMIA: ICD-10-CM

## 2022-03-29 DIAGNOSIS — E87.6 HYPOKALEMIA: ICD-10-CM

## 2022-03-29 DIAGNOSIS — C45.0 MALIGNANT MESOTHELIOMA OF PLEURA: Primary | ICD-10-CM

## 2022-03-29 DIAGNOSIS — D64.81 ANTINEOPLASTIC CHEMOTHERAPY INDUCED ANEMIA: ICD-10-CM

## 2022-03-29 PROCEDURE — 3079F DIAST BP 80-89 MM HG: CPT | Mod: CPTII,S$GLB,,

## 2022-03-29 PROCEDURE — 99999 PR PBB SHADOW E&M-EST. PATIENT-LVL IV: CPT | Mod: PBBFAC,,,

## 2022-03-29 PROCEDURE — 25000003 PHARM REV CODE 250: Mod: PN

## 2022-03-29 PROCEDURE — A4216 STERILE WATER/SALINE, 10 ML: HCPCS | Mod: PN

## 2022-03-29 PROCEDURE — 1126F PR PAIN SEVERITY QUANTIFIED, NO PAIN PRESENT: ICD-10-PCS | Mod: CPTII,S$GLB,,

## 2022-03-29 PROCEDURE — 63600175 PHARM REV CODE 636 W HCPCS: Mod: PN

## 2022-03-29 PROCEDURE — 99999 PR PBB SHADOW E&M-EST. PATIENT-LVL IV: ICD-10-PCS | Mod: PBBFAC,,,

## 2022-03-29 PROCEDURE — 3075F SYST BP GE 130 - 139MM HG: CPT | Mod: CPTII,S$GLB,,

## 2022-03-29 PROCEDURE — 96409 CHEMO IV PUSH SNGL DRUG: CPT | Mod: PN

## 2022-03-29 PROCEDURE — 3079F PR MOST RECENT DIASTOLIC BLOOD PRESSURE 80-89 MM HG: ICD-10-PCS | Mod: CPTII,S$GLB,,

## 2022-03-29 PROCEDURE — 99214 OFFICE O/P EST MOD 30 MIN: CPT | Mod: S$GLB,,,

## 2022-03-29 PROCEDURE — 1159F PR MEDICATION LIST DOCUMENTED IN MEDICAL RECORD: ICD-10-PCS | Mod: CPTII,S$GLB,,

## 2022-03-29 PROCEDURE — 3288F FALL RISK ASSESSMENT DOCD: CPT | Mod: CPTII,S$GLB,,

## 2022-03-29 PROCEDURE — 1101F PT FALLS ASSESS-DOCD LE1/YR: CPT | Mod: CPTII,S$GLB,,

## 2022-03-29 PROCEDURE — 3075F PR MOST RECENT SYSTOLIC BLOOD PRESS GE 130-139MM HG: ICD-10-PCS | Mod: CPTII,S$GLB,,

## 2022-03-29 PROCEDURE — 1101F PR PT FALLS ASSESS DOC 0-1 FALLS W/OUT INJ PAST YR: ICD-10-PCS | Mod: CPTII,S$GLB,,

## 2022-03-29 PROCEDURE — 99214 PR OFFICE/OUTPT VISIT, EST, LEVL IV, 30-39 MIN: ICD-10-PCS | Mod: S$GLB,,,

## 2022-03-29 PROCEDURE — 1159F MED LIST DOCD IN RCRD: CPT | Mod: CPTII,S$GLB,,

## 2022-03-29 PROCEDURE — 3288F PR FALLS RISK ASSESSMENT DOCUMENTED: ICD-10-PCS | Mod: CPTII,S$GLB,,

## 2022-03-29 PROCEDURE — 1126F AMNT PAIN NOTED NONE PRSNT: CPT | Mod: CPTII,S$GLB,,

## 2022-03-29 RX ORDER — HEPARIN 100 UNIT/ML
500 SYRINGE INTRAVENOUS
Status: CANCELLED | OUTPATIENT
Start: 2022-03-29

## 2022-03-29 RX ORDER — DEXAMETHASONE 4 MG/1
4 TABLET ORAL
Status: COMPLETED | OUTPATIENT
Start: 2022-03-29 | End: 2022-03-29

## 2022-03-29 RX ORDER — HEPARIN 100 UNIT/ML
500 SYRINGE INTRAVENOUS
Status: DISCONTINUED | OUTPATIENT
Start: 2022-03-29 | End: 2022-03-29 | Stop reason: HOSPADM

## 2022-03-29 RX ORDER — POTASSIUM CHLORIDE 20 MEQ/1
20 TABLET, EXTENDED RELEASE ORAL DAILY
Qty: 90 TABLET | Refills: 0 | Status: SHIPPED | OUTPATIENT
Start: 2022-03-29 | End: 2022-07-07

## 2022-03-29 RX ORDER — DEXAMETHASONE 4 MG/1
4 TABLET ORAL
Status: CANCELLED | OUTPATIENT
Start: 2022-03-29

## 2022-03-29 RX ORDER — SODIUM CHLORIDE 0.9 % (FLUSH) 0.9 %
10 SYRINGE (ML) INJECTION
Status: CANCELLED | OUTPATIENT
Start: 2022-03-29

## 2022-03-29 RX ORDER — SODIUM CHLORIDE 0.9 % (FLUSH) 0.9 %
10 SYRINGE (ML) INJECTION
Status: DISCONTINUED | OUTPATIENT
Start: 2022-03-29 | End: 2022-03-29 | Stop reason: HOSPADM

## 2022-03-29 RX ADMIN — Medication 500 UNITS: at 02:03

## 2022-03-29 RX ADMIN — Medication 10 ML: at 02:03

## 2022-03-29 RX ADMIN — DEXAMETHASONE 4 MG: 4 TABLET ORAL at 02:03

## 2022-03-29 RX ADMIN — SODIUM CHLORIDE: 9 INJECTION, SOLUTION INTRAVENOUS at 02:03

## 2022-03-29 RX ADMIN — VINORELBINE 40 MG: 10 INJECTION, SOLUTION INTRAVENOUS at 02:03

## 2022-03-29 NOTE — PROGRESS NOTES
Subjective:     Name: Yoanna Thapa  : 1945  MRN: [id number]    Chief Complaint: Follow-up, mesothelioma    HPI:   Yoanna Thaap is a 76 y.o. female known to Dr. Mcdaniel who presents for evaluation of pleural mesothelioma and consideration of C1 D15 Navelbine . She is accompanied by her .    Patient has occcasional bloating and abdominal discomfort, she takes PRN laxatives and Gas-x; these seem to help symptoms. Otherwise, she is tolerating Navelbine well.   No weakness, facial drooping, problems with coordination. Appetite has been good; weight stable at 122lbs.   Denies fevers, chills, rigors. Denies CP, palpitations, SOB, productive cough, abdominal pain, changes in bowel habits, urinary discomfort or frequency changes. No new lumps, bumps or swelling. Denies bruising or bleeding.     Prior History:   In the  she began experiencing difficulties with breathing and SOB. Was found to have pleural effusion. 2021 effusion recurred and she underwent a pleural biopsy which revealed malignant mesothelioma.   2021 Pleurx catheter was placed  2021 Staging laparotomy done. No diaphragmatic involvement or intra-abdominal disease in association with her diagnosis of malignant mesothelioma.   03/10/2021 to 2021- Neoadjuvant Cisplatin/Alimta .Performance status too poor to allow for surgical intervention.  Had once cycle of maintenance Alimta; tolerated poorly.  21 to 11/10/21- Keytruda; PET on 21 showed progressive disease  21 to 2022- Single agent Gemzar. PET/CT on 3/07/2022 shows progression of disease.  3/15/2022- Began Navelbine on D1, 8, 15 of a 28 day cycle     Past Medical History:   Diagnosis Date    Anxiety     Breast cancer     Right: Lumpectomy and XRT    Cancer 2009    breast    Cancer of pleura, secondary 2021    Depression     Disorder of kidney and ureter     GERD (gastroesophageal reflux disease)      Hyperlipidemia     Hypertension     Malignant pleural effusion 1/27/2021    Symptomatic anemia 6/15/2021       Past Surgical History:   Procedure Laterality Date    APPENDECTOMY      BACK SURGERY      BREAST LUMPECTOMY Right     COSMETIC SURGERY      DIAGNOSTIC LAPAROSCOPY N/A 2/18/2021    Procedure: LAPAROSCOPY, DIAGNOSTIC;  Surgeon: Aravind Novak MD;  Location: [facility] OR 2ND FLR;  Service: Colon and Rectal;  Laterality: N/A;    ESOPHAGEAL DILATION N/A 5/20/2021    Procedure: DILATION, ESOPHAGUS;  Surgeon: HO Machuca MD;  Location: [facility] ENDO;  Service: Endoscopy;  Laterality: N/A;    ESOPHAGOGASTRODUODENOSCOPY N/A 5/20/2021    Procedure: EGD (ESOPHAGOGASTRODUODENOSCOPY);  Surgeon: HO Machuca MD;  Location: [facility] ENDO;  Service: Endoscopy;  Laterality: N/A;    HYSTERECTOMY      INSERTION OF SUBCUTANEOUS PORT      INSERTION OF TUNNELED CENTRAL VENOUS CATHETER (CVC) WITH SUBCUTANEOUS PORT  2/18/2021    Procedure: INSERTION, PORT-A-CATH;  Surgeon: Aravind Novak MD;  Location: [facility] OR [facility] FLR;  Service: Colon and Rectal;;    SPINE SURGERY      THORACENTESIS Right 12/28/2020    [place]    THORACENTESIS Right 01/24/2021    [facility]    TUBE THORACOTOMY Right 2/1/2021    Procedure: INSERTION, CATHETER, INTERCOSTAL, FOR DRAINAGE;  Surgeon: Esdras Jarrett MD;  Location: [facility] OR;  Service: Cardiothoracic;  Laterality: Right;    VIDEO-ASSISTED THORACOSCOPIC SURGERY (VATS) Right 1/27/2021    Procedure: VATS (VIDEO-ASSISTED THORACOSCOPIC SURGERY) - Pleural biopsy ;  Surgeon: Esdras Jarrett MD;  Location: [facility] OR;  Service: Cardiovascular;  Laterality: Right;           Review of patient's allergies indicates:   Allergen Reactions    Iodine and iodide containing products Anaphylaxis and Swelling     Dye causes Blisters.  Pt tolerates shrimp and betadine.    Sulfa (sulfonamide antibiotics) Anaphylaxis and Hives    Adhesive Other (See Comments)     Blister    Blisters   Pt  "request paper tape.    Iodinated contrast media Blisters       Review of Systems   Constitutional: Negative for chills, decreased appetite, diaphoresis, fever, malaise/fatigue and night sweats.   HENT: Negative for congestion and sore throat.    Eyes: Negative for visual disturbance.   Cardiovascular: Negative for chest pain, dyspnea on exertion, leg swelling and palpitations.   Respiratory: Positive for wheezing (at night). Negative for cough and shortness of breath.    Hematologic/Lymphatic: Negative for adenopathy and bleeding problem. Does not bruise/bleed easily.   Skin: Negative for dry skin, itching and rash.   Musculoskeletal: Negative for joint pain, joint swelling and myalgias.   Gastrointestinal: Negative for abdominal pain, constipation, diarrhea, heartburn, hematochezia, nausea and vomiting.   Genitourinary: Negative for dysuria, frequency, hematuria and urgency.   Neurological: Positive for light-headedness. Negative for headaches, numbness, paresthesias and weakness.       Objective:     Vitals:    03/29/22 1307   BP: 134/80   BP Location: Left arm   Patient Position: Sitting   BP Method: Medium (Manual)   Pulse: 104   Temp: 97.2 °F (36.2 °C)   TempSrc: Temporal   SpO2: 100%   Weight: 55.7 kg (122 lb 12.7 oz)   Height: 5' 5" (1.651 m)        Physical Exam  Vitals reviewed.   Constitutional:       General: She is not in acute distress.     Appearance: She is not diaphoretic.   HENT:      Head: Normocephalic and atraumatic.      Mouth/Throat:      Mouth: Mucous membranes are moist.      Pharynx: No oropharyngeal exudate or posterior oropharyngeal erythema.   Eyes:      General: No scleral icterus.     Pupils: Pupils are equal, round, and reactive to light.   Cardiovascular:      Rate and Rhythm: Normal rate and regular rhythm.      Heart sounds: No murmur heard.    No friction rub. No gallop.   Pulmonary:      Effort: Pulmonary effort is normal. No respiratory distress.      Breath sounds: Wheezing " present. No rhonchi or rales.   Abdominal:      General: Bowel sounds are normal.      Palpations: There is no mass.      Tenderness: There is no abdominal tenderness. There is no guarding.   Musculoskeletal:         General: No swelling.      Cervical back: No tenderness.      Right lower leg: No edema.      Left lower leg: No edema.   Lymphadenopathy:      Cervical: No cervical adenopathy.   Skin:     General: Skin is warm and dry.      Findings: No bruising, erythema or rash.   Neurological:      Mental Status: She is alert and oriented to person, place, and time.      Motor: No weakness.      Coordination: Coordination normal.      Gait: Gait normal.   Psychiatric:         Mood and Affect: Mood normal.         Behavior: Behavior normal.         Judgment: Judgment normal.         Current Outpatient Medications on File Prior to Visit   Medication Sig    acetaminophen (TYLENOL) 500 MG tablet Take 1,000 mg by mouth every 6 (six) hours as needed for Pain.    albuterol (PROVENTIL/VENTOLIN HFA) 90 mcg/actuation inhaler Inhale 2 puffs into the lungs every 6 (six) hours as needed for Wheezing. Rescue    ALPRAZolam (XANAX) 0.25 MG tablet Take 1 tablet (0.25 mg total) by mouth 3 (three) times daily as needed for Anxiety.    amLODIPine (NORVASC) 5 MG tablet Take 1 tablet (5 mg total) by mouth once daily.    ascorbic acid, vitamin C, (VITAMIN C) 500 MG tablet Take 500 mg by mouth every morning.    atorvastatin (LIPITOR) 10 MG tablet Take 10 mg by mouth every morning.     cyclobenzaprine (FLEXERIL) 10 MG tablet Take 10 mg by mouth daily as needed.     docusate sodium (COLACE) 100 MG capsule Take 100 mg by mouth 2 (two) times daily as needed for Constipation.    HYDROcodone-acetaminophen (NORCO) 5-325 mg per tablet Take 1 tablet by mouth every 6 (six) hours as needed for Pain.    LIDOcaine-prilocaine (EMLA) cream Apply topically as needed.    loratadine (CLARITIN) 10 mg tablet Take 1 tablet by mouth once daily.     losartan (COZAAR) 100 MG tablet Take 100 mg by mouth every morning.     megestroL (MEGACE ES) 625 mg/5 mL (125 mg/mL) Susp Take 5 mLs (625 mg total) by mouth once daily.    megestroL (MEGACE) 400 mg/10 mL (40 mg/mL) Susp Take 200 mg by mouth once daily.    multivitamin capsule Take 1 capsule by mouth once daily.    ondansetron (ZOFRAN-ODT) 4 MG TbDL 4 mg every 6 (six) hours.    pantoprazole (PROTONIX) 40 MG tablet Take 40 mg by mouth every morning.     paroxetine (PAXIL) 20 MG tablet Take 1 tablet by mouth every morning.    triamcinolone acetonide 0.025% (KENALOG) 0.025 % cream Apply topically 2 (two) times daily.    triamcinolone acetonide 0.025% (KENALOG) 0.025 % Oint Apply topically 2 (two) times daily. Apply to affected area twice daily    venlafaxine (EFFEXOR XR) 75 MG 24 hr capsule Take 2 capsules (150 mg total) by mouth once daily.    water Liqd 150 mL with MILK OF MAGNESIA 400 mg/5 mL Susp 400 mg, diphenhydrAMINE 12.5 mg/5 mL Elix 60 mg, nystatin 100,000 unit/mL Susp 500,000 Units SWISH & SWALLOW 5 ML'S EVERY 4 HOURS AS NEEDED FOR MOUTH & THROAT PAIN    [DISCONTINUED] potassium chloride (KLOR-CON) 10 MEQ TbSR TAKE 1 TABLET (10 MEQ) BY MOUTH 1 TIME A DAY    [DISCONTINUED] potassium chloride SA (K-DUR,KLOR-CON) 20 MEQ tablet Take 1 tablet (20 mEq total) by mouth 2 (two) times daily. (Patient taking differently: Take 20 mEq by mouth once daily.)    folic acid (FOLVITE) 1 MG tablet Take 1 tablet (1 mg total) by mouth once daily.    gabapentin (NEURONTIN) 300 MG capsule Take 1 capsule (300 mg total) by mouth 2 (two) times daily.     Current Facility-Administered Medications on File Prior to Visit   Medication    ceFAZolin injection 2 g       CBC:  Lab Results   Component Value Date    WBC 4.35 03/29/2022    HGB 10.0 (L) 03/29/2022    HCT 30.3 (L) 03/29/2022     (H) 03/29/2022     03/29/2022         CMP:  Sodium   Date Value Ref Range Status   03/07/2022 139 136 - 145 mmol/L Final      Potassium   Date Value Ref Range Status   03/07/2022 5.0 3.5 - 5.1 mmol/L Final     Chloride   Date Value Ref Range Status   03/07/2022 104 95 - 110 mmol/L Final     CO2   Date Value Ref Range Status   03/07/2022 23 23 - 29 mmol/L Final     Glucose   Date Value Ref Range Status   03/07/2022 97 70 - 110 mg/dL Final     BUN   Date Value Ref Range Status   03/07/2022 23 8 - 23 mg/dL Final     Creatinine   Date Value Ref Range Status   03/07/2022 1.1 0.5 - 1.4 mg/dL Final     Calcium   Date Value Ref Range Status   03/07/2022 9.7 8.7 - 10.5 mg/dL Final     Total Protein   Date Value Ref Range Status   03/07/2022 8.0 6.0 - 8.4 g/dL Final     Albumin   Date Value Ref Range Status   03/07/2022 3.6 3.5 - 5.2 g/dL Final     Total Bilirubin   Date Value Ref Range Status   03/07/2022 0.3 0.1 - 1.0 mg/dL Final     Comment:     For infants and newborns, interpretation of results should be based  on gestational age, weight and in agreement with clinical  observations.    Premature Infant recommended reference ranges:  Up to 24 hours.............<8.0 mg/dL  Up to 48 hours............<12.0 mg/dL  3-5 days..................<15.0 mg/dL  6-29 days.................<15.0 mg/dL       Alkaline Phosphatase   Date Value Ref Range Status   03/07/2022 272 (H) 55 - 135 U/L Final     AST   Date Value Ref Range Status   03/07/2022 20 10 - 40 U/L Final     ALT   Date Value Ref Range Status   03/07/2022 23 10 - 44 U/L Final     Anion Gap   Date Value Ref Range Status   03/07/2022 12 8 - 16 mmol/L Final     eGFR if    Date Value Ref Range Status   03/07/2022 57 (A) >60 mL/min/1.73 m^2 Final     eGFR if non    Date Value Ref Range Status   03/07/2022 49 (A) >60 mL/min/1.73 m^2 Final     Comment:     Calculation used to obtain the estimated glomerular filtration  rate (eGFR) is the CKD-EPI equation.          Assessment:       1. Malignant mesothelioma of pleura    2. Antineoplastic chemotherapy induced anemia    3.  Hypokalemia         Plan:   Malignant mesothelioma of pleura  -Proceed with Navelbine C1 D15 today as planned   -Follow up on 4/11 with Dr. Mcdaniel and labs, C2 Navelbine on 4/12     Anemia   -Chemotherapy induced anemia  -Last received Retacrit 09/2021  -Will continue to hold Retacrit in light of her recent TIA, counts have been stable  -Hbg today 10.0 g/dL;will continue to monitor        Follow-up:  Refill of Potassium 20 meq tabs was sent to patient pharmacy, she has been taking one tab po daily; this is how refill was submitted today.     Follow-up with Dr. Mcdaniel on 04/11/22 for consideration of C2 D1 of Navelbine. CBC,CMP, LDH, Mg  prior to the visit.    Patient queried and all questions were answered.      Route Chart for Scheduling    Med Onc Chart Routing      Follow up with physician 2 weeks. With Dr. Mcdaniel on 4/11 as planned, C2D1 Navelbine on 4/12   Follow up with DREW    Labs    Lab interval:  CBC, CMP, LDH, Mg prior to 4/11 appointment    Imaging    Pharmacy appointment    Other referrals          Treatment Plan Information   OP VINORELBINE Q4W   Presley Mcdaniel MD   Upcoming Treatment Dates - OP VINORELBINE Q4W    3/29/2022       Pre-Medications       dexAMETHasone tablet 4 mg       Chemotherapy       vinorelbine (NAVELBINE) 40 mg in sodium chloride 0.9% 50 mL chemo infusion  4/12/2022       Pre-Medications       dexAMETHasone tablet 4 mg       Chemotherapy       vinorelbine (NAVELBINE) 40 mg in sodium chloride 0.9% 50 mL chemo infusion  4/19/2022       Pre-Medications       dexAMETHasone tablet 4 mg       Chemotherapy       vinorelbine (NAVELBINE) 40 mg in sodium chloride 0.9% 50 mL chemo infusion  4/26/2022       Pre-Medications       dexAMETHasone tablet 4 mg       Chemotherapy       vinorelbine (NAVELBINE) 40 mg in sodium chloride 0.9% 50 mL chemo infusion    Supportive Plan Information  IV FLUIDS AND ELECTROLYTES   Presley Mcdaniel MD   Upcoming Treatment Dates - IV FLUIDS AND  ELECTROLYTES    No upcoming days in selected categories.

## 2022-04-11 ENCOUNTER — OFFICE VISIT (OUTPATIENT)
Dept: HEMATOLOGY/ONCOLOGY | Facility: CLINIC | Age: 77
End: 2022-04-11
Payer: MEDICARE

## 2022-04-11 ENCOUNTER — LAB VISIT (OUTPATIENT)
Dept: LAB | Facility: HOSPITAL | Age: 77
End: 2022-04-11
Attending: INTERNAL MEDICINE
Payer: MEDICARE

## 2022-04-11 VITALS
BODY MASS INDEX: 20.24 KG/M2 | RESPIRATION RATE: 16 BRPM | HEART RATE: 101 BPM | TEMPERATURE: 98 F | WEIGHT: 121.5 LBS | SYSTOLIC BLOOD PRESSURE: 153 MMHG | HEIGHT: 65 IN | OXYGEN SATURATION: 95 % | DIASTOLIC BLOOD PRESSURE: 81 MMHG

## 2022-04-11 DIAGNOSIS — G45.9 TRANSIENT CEREBRAL ISCHEMIA, UNSPECIFIED TYPE: ICD-10-CM

## 2022-04-11 DIAGNOSIS — C45.0 MALIGNANT MESOTHELIOMA OF PLEURA: Primary | ICD-10-CM

## 2022-04-11 DIAGNOSIS — F32.0 CURRENT MILD EPISODE OF MAJOR DEPRESSIVE DISORDER WITHOUT PRIOR EPISODE: ICD-10-CM

## 2022-04-11 DIAGNOSIS — D64.81 ANTINEOPLASTIC CHEMOTHERAPY INDUCED ANEMIA: ICD-10-CM

## 2022-04-11 DIAGNOSIS — R42 DIZZINESS: ICD-10-CM

## 2022-04-11 DIAGNOSIS — G89.3 NEOPLASM RELATED PAIN: ICD-10-CM

## 2022-04-11 DIAGNOSIS — K20.90 ESOPHAGITIS: ICD-10-CM

## 2022-04-11 DIAGNOSIS — N18.30 STAGE 3 CHRONIC KIDNEY DISEASE, UNSPECIFIED WHETHER STAGE 3A OR 3B CKD: ICD-10-CM

## 2022-04-11 DIAGNOSIS — R53.83 FATIGUE, UNSPECIFIED TYPE: ICD-10-CM

## 2022-04-11 DIAGNOSIS — T45.1X5A ANTINEOPLASTIC CHEMOTHERAPY INDUCED ANEMIA: ICD-10-CM

## 2022-04-11 DIAGNOSIS — C45.0 MALIGNANT MESOTHELIOMA OF PLEURA: ICD-10-CM

## 2022-04-11 DIAGNOSIS — J40 BRONCHITIS: ICD-10-CM

## 2022-04-11 LAB
ALBUMIN SERPL BCP-MCNC: 3.5 G/DL (ref 3.5–5.2)
ALP SERPL-CCNC: 178 U/L (ref 55–135)
ALT SERPL W/O P-5'-P-CCNC: 20 U/L (ref 10–44)
ANION GAP SERPL CALC-SCNC: 10 MMOL/L (ref 8–16)
AST SERPL-CCNC: 19 U/L (ref 10–40)
BASOPHILS # BLD AUTO: 0.11 K/UL (ref 0–0.2)
BASOPHILS NFR BLD: 1.8 % (ref 0–1.9)
BILIRUB SERPL-MCNC: 0.3 MG/DL (ref 0.1–1)
BUN SERPL-MCNC: 18 MG/DL (ref 8–23)
CALCIUM SERPL-MCNC: 9.6 MG/DL (ref 8.7–10.5)
CHLORIDE SERPL-SCNC: 103 MMOL/L (ref 95–110)
CO2 SERPL-SCNC: 28 MMOL/L (ref 23–29)
CREAT SERPL-MCNC: 1.2 MG/DL (ref 0.5–1.4)
DIFFERENTIAL METHOD: ABNORMAL
EOSINOPHIL # BLD AUTO: 0.4 K/UL (ref 0–0.5)
EOSINOPHIL NFR BLD: 7.1 % (ref 0–8)
ERYTHROCYTE [DISTWIDTH] IN BLOOD BY AUTOMATED COUNT: 13.2 % (ref 11.5–14.5)
EST. GFR  (AFRICAN AMERICAN): 51 ML/MIN/1.73 M^2
EST. GFR  (NON AFRICAN AMERICAN): 44 ML/MIN/1.73 M^2
GLUCOSE SERPL-MCNC: 101 MG/DL (ref 70–110)
HCT VFR BLD AUTO: 35.3 % (ref 37–48.5)
HGB BLD-MCNC: 11.5 G/DL (ref 12–16)
IMM GRANULOCYTES # BLD AUTO: 0.01 K/UL (ref 0–0.04)
IMM GRANULOCYTES NFR BLD AUTO: 0.2 % (ref 0–0.5)
LDH SERPL L TO P-CCNC: 181 U/L (ref 110–260)
LYMPHOCYTES # BLD AUTO: 1.3 K/UL (ref 1–4.8)
LYMPHOCYTES NFR BLD: 21.2 % (ref 18–48)
MAGNESIUM SERPL-MCNC: 2.1 MG/DL (ref 1.6–2.6)
MCH RBC QN AUTO: 32.4 PG (ref 27–31)
MCHC RBC AUTO-ENTMCNC: 32.6 G/DL (ref 32–36)
MCV RBC AUTO: 99 FL (ref 82–98)
MONOCYTES # BLD AUTO: 1.2 K/UL (ref 0.3–1)
MONOCYTES NFR BLD: 19.3 % (ref 4–15)
NEUTROPHILS # BLD AUTO: 3.2 K/UL (ref 1.8–7.7)
NEUTROPHILS NFR BLD: 50.4 % (ref 38–73)
NRBC BLD-RTO: 0 /100 WBC
PLATELET # BLD AUTO: 331 K/UL (ref 150–450)
PMV BLD AUTO: 10 FL (ref 9.2–12.9)
POTASSIUM SERPL-SCNC: 4.9 MMOL/L (ref 3.5–5.1)
PROT SERPL-MCNC: 7.9 G/DL (ref 6–8.4)
RBC # BLD AUTO: 3.55 M/UL (ref 4–5.4)
SODIUM SERPL-SCNC: 141 MMOL/L (ref 136–145)
WBC # BLD AUTO: 6.23 K/UL (ref 3.9–12.7)

## 2022-04-11 PROCEDURE — 3288F PR FALLS RISK ASSESSMENT DOCUMENTED: ICD-10-PCS | Mod: CPTII,S$GLB,, | Performed by: INTERNAL MEDICINE

## 2022-04-11 PROCEDURE — 1160F PR REVIEW ALL MEDS BY PRESCRIBER/CLIN PHARMACIST DOCUMENTED: ICD-10-PCS | Mod: CPTII,S$GLB,, | Performed by: INTERNAL MEDICINE

## 2022-04-11 PROCEDURE — 1159F MED LIST DOCD IN RCRD: CPT | Mod: CPTII,S$GLB,, | Performed by: INTERNAL MEDICINE

## 2022-04-11 PROCEDURE — 83735 ASSAY OF MAGNESIUM: CPT | Mod: PN | Performed by: INTERNAL MEDICINE

## 2022-04-11 PROCEDURE — 99999 PR PBB SHADOW E&M-EST. PATIENT-LVL III: CPT | Mod: PBBFAC,,, | Performed by: INTERNAL MEDICINE

## 2022-04-11 PROCEDURE — 3079F PR MOST RECENT DIASTOLIC BLOOD PRESSURE 80-89 MM HG: ICD-10-PCS | Mod: CPTII,S$GLB,, | Performed by: INTERNAL MEDICINE

## 2022-04-11 PROCEDURE — 3288F FALL RISK ASSESSMENT DOCD: CPT | Mod: CPTII,S$GLB,, | Performed by: INTERNAL MEDICINE

## 2022-04-11 PROCEDURE — 1159F PR MEDICATION LIST DOCUMENTED IN MEDICAL RECORD: ICD-10-PCS | Mod: CPTII,S$GLB,, | Performed by: INTERNAL MEDICINE

## 2022-04-11 PROCEDURE — 80053 COMPREHEN METABOLIC PANEL: CPT | Mod: PN | Performed by: INTERNAL MEDICINE

## 2022-04-11 PROCEDURE — 3079F DIAST BP 80-89 MM HG: CPT | Mod: CPTII,S$GLB,, | Performed by: INTERNAL MEDICINE

## 2022-04-11 PROCEDURE — 99215 PR OFFICE/OUTPT VISIT, EST, LEVL V, 40-54 MIN: ICD-10-PCS | Mod: S$GLB,,, | Performed by: INTERNAL MEDICINE

## 2022-04-11 PROCEDURE — 1101F PT FALLS ASSESS-DOCD LE1/YR: CPT | Mod: CPTII,S$GLB,, | Performed by: INTERNAL MEDICINE

## 2022-04-11 PROCEDURE — 36415 COLL VENOUS BLD VENIPUNCTURE: CPT | Mod: PN | Performed by: INTERNAL MEDICINE

## 2022-04-11 PROCEDURE — 83615 LACTATE (LD) (LDH) ENZYME: CPT | Mod: PN | Performed by: INTERNAL MEDICINE

## 2022-04-11 PROCEDURE — 99999 PR PBB SHADOW E&M-EST. PATIENT-LVL III: ICD-10-PCS | Mod: PBBFAC,,, | Performed by: INTERNAL MEDICINE

## 2022-04-11 PROCEDURE — 3077F PR MOST RECENT SYSTOLIC BLOOD PRESSURE >= 140 MM HG: ICD-10-PCS | Mod: CPTII,S$GLB,, | Performed by: INTERNAL MEDICINE

## 2022-04-11 PROCEDURE — 3077F SYST BP >= 140 MM HG: CPT | Mod: CPTII,S$GLB,, | Performed by: INTERNAL MEDICINE

## 2022-04-11 PROCEDURE — 1126F PR PAIN SEVERITY QUANTIFIED, NO PAIN PRESENT: ICD-10-PCS | Mod: CPTII,S$GLB,, | Performed by: INTERNAL MEDICINE

## 2022-04-11 PROCEDURE — 99215 OFFICE O/P EST HI 40 MIN: CPT | Mod: S$GLB,,, | Performed by: INTERNAL MEDICINE

## 2022-04-11 PROCEDURE — 1126F AMNT PAIN NOTED NONE PRSNT: CPT | Mod: CPTII,S$GLB,, | Performed by: INTERNAL MEDICINE

## 2022-04-11 PROCEDURE — 1160F RVW MEDS BY RX/DR IN RCRD: CPT | Mod: CPTII,S$GLB,, | Performed by: INTERNAL MEDICINE

## 2022-04-11 PROCEDURE — 1101F PR PT FALLS ASSESS DOC 0-1 FALLS W/OUT INJ PAST YR: ICD-10-PCS | Mod: CPTII,S$GLB,, | Performed by: INTERNAL MEDICINE

## 2022-04-11 PROCEDURE — 85025 COMPLETE CBC W/AUTO DIFF WBC: CPT | Mod: PN | Performed by: INTERNAL MEDICINE

## 2022-04-11 RX ORDER — HEPARIN 100 UNIT/ML
500 SYRINGE INTRAVENOUS
Status: CANCELLED | OUTPATIENT
Start: 2022-04-12

## 2022-04-11 RX ORDER — DEXAMETHASONE 4 MG/1
4 TABLET ORAL
Status: CANCELLED | OUTPATIENT
Start: 2022-04-12

## 2022-04-11 RX ORDER — DOXYCYCLINE 100 MG/1
100 CAPSULE ORAL 2 TIMES DAILY
Qty: 14 CAPSULE | Refills: 0 | Status: SHIPPED | OUTPATIENT
Start: 2022-04-11

## 2022-04-11 RX ORDER — SODIUM CHLORIDE 0.9 % (FLUSH) 0.9 %
10 SYRINGE (ML) INJECTION
Status: CANCELLED | OUTPATIENT
Start: 2022-04-12

## 2022-04-11 NOTE — PROGRESS NOTES
History of present illness:  The patient is a 76-year-old white female recently discovered to have pleural mesothelioma.  Patient began experiencing difficulties with shortness of breath last fall.  She was eventually discovered to have pleural effusion and had a drain.  However recurred.  Patient eventually underwent pleural biopsy which returned positive for malignant mesothelioma.  She also had PleurX catheter placed to assist with periodic drainage of pleural fluid.  She drains every 2 days and obtains anywhere from 150-200 cc per drain.  Patient has some difficulties with thoracic discomfort and has hydrocodone 7.5-325 on hand.  She takes this only at night.  She denies weight loss, productive cough, hemoptysis, or worsening dyspnea.  She has been seen and evaluated by Dr. Novak and is scheduled to undergo staging laparoscopy to determine whether not she has spread of disease across the diaphragm into the abdomen.  Consult has been requested for assistance with use of neoadjuvant or palliative chemotherapy.     Since initial office consultation, patient is undergone exploratory laparotomy.  She was found not to have any diaphragmatic involvement or intra-abdominal disease in association with her diagnosis of malignant mesothelioma.  She returns to clinic to review interval studies in preparation for neoadjuvant chemotherapy.  MediPort catheter has been placed within the patient's right chest.     Patient returns to clinic for re-evaluation following 4 cycles of cisplatin/Alimta. Review of imaging and discussions in tumor board following completion of neoadjuvant therapy were for maintenance Alimta as opposed to surgical intervention.  However, patient's performance status was too poor to allow for such and she was observed until performance status would allow for therapy.  She has completed 1 cycle of maintenance Alimta and returns to clinic for toxicity assessment part way through her cycle.  Patient  reports increasing fatigue and disequilibrium.  She also has intermittent, evening, postprandial nausea emesis.  She is experiencing worsening dyspnea on exertion.  Pleural catheter drainage is nil.    Patient returns to clinic to review interval lab prior to initiation of cycle 4 of Keytruda.  Patient, unfortunately, failed therapy with Keytruda and has been transitioned to single agent Gemzar.  She returns to clinic for evaluation prior to cycle 4, day 8 of therapy.  Patient has had no additional symptoms of TIA.  Patient has yet to be evaluated by Neurology.  Appointment has been scheduled for 04/19/2022.    Patient has failed therapy with Gemzar and is now receiving single agent Navelbine.  She returns to clinic for cycle 2, day 1 of therapy.  Patient has cough which sounds PET.  However, she cannot get of secretions.  She is also experiencing difficulties with dizziness.  Previous MRI of the brain obtained in February was unremarkable.  She has new complaint of right earache.  She has not had fever in association with these difficulties.      Physical examination:  Well-developed, well-nourished, elderly, white female, no acute distress, who has a weight of 121.5 lb (decreased by 2 lb).  VITAL SIGNS: Documented  and reviewed this visit.  HEENT: Normocephalic, atraumatic. Oral mucosa pink and moist. Lips without lesions. Tongue midline. Oropharynx clear. Nonicteric sclerae.  Tympanic membranes are clear.  NECK: Supple, no adenopathy. No carotid bruits, thyromegaly or thyroid nodule.   HEART: Regular rate and rhythm without murmur, gallop or rub.   LUNGS:  Absent breath sounds over the right hemithorax.  Otherwise, clear to auscultation with normal respiratory effort.    ABDOMEN: Soft, nontender, nondistended with positive normoactive bowel sounds, no hepatosplenomegaly.   EXTREMITIES: No cyanosis, clubbing or edema. Distal pulses are intact.   AXILLAE AND GROIN: No palpable pathologic lymphadenopathy is  appreciated.   SKIN: Intact/turgor normal.  MediPort catheter present within the right chest.  Surgical wound is well approximated and healed.  It is free from signs of infection.    NEUROLOGIC: Cranial nerves II-XII grossly intact. Motor: Good muscle bulk and tone. Strength/sensory 5/5 throughout. Gait unstable/ambulating with the assistance of a cane.     Laboratory:  White count 6.2, hemoglobin 11.5, hematocrit 35.3, platelets 331, absolute neutrophil count is 3200.   Sodium 141, potassium 4.9, chloride 103, CO2 28, BUN 18, creatinine 1.2, glucose 101, calcium 9.6, magnesium 2.1, alkaline phosphatase 178, liver function test within normal limits, LDH is 181, GFR is 44.     Guardant 360 panel:  Remarkable for TP53 mutation and an EGFR mutation of uncertain clinical significance.  Tumor was not MSI high.    Impression:  1. Malignant pleural mesothelioma with disease progression on Gemzar.    2. Therapy associated fatigue malaise.  3. Depression/anxiety around issues of health.    4. Esophagitis - S/P dilatation and biopsy.  5. Mild chemotherapy associated anemia not in need intervention.  6. Stage 3 chronic kidney disease.  7. Transient ischemic attack.  8. Dizziness.  9. Right earache.  10. Acute bronchitis-Unknown pathogen.    Plan:  1. Proceed with cycle 2, day 1 dose of Navelbine.  2. Patient is to return to clinic in 1 week with CBC, BMP, and magnesium prior to day 8 dose of therapy and see the nurse practitioner.  3. Patient is to return to clinic in 2 weeks with interval CBC, BMP, and magnesium prior to day 15 of therapy and see the nurse practitioner.    4. See me in 1 month with interval CBC, CMP, LDH, magnesium at which time will proceed with 3rd cycle of therapy.  5. Doxycycline 100 mg p.o. b.i.d. for 7 days for treatment of bronchitis.  6. Ambulatory referral to Otolaryngology for further evaluation of dizziness.    Advance Care Planning During this visit, I engaged the patient in the advance care  planning process.  The patient and I reviewed the role for advance directives and their purpose in directing future healthcare if the patients unable to speak for him/herself.  At this point in time, the patient does have full decision-making capacity.  We discussed different extreme health states that they could experience, and reviewed what kind of medical care the patient would want in those situations.  The patient communicated that if they were comatose and had little chance of a meaningful recovery, they would not want machines/life-sustaining treatments used.  In addition to the above preference, other important end-of-life issues for the patient include DNR.  The patient has  completed a living will to reflect these preferences.  The patient has already designated a healthcare power of  (her ) to make decisions on their behalf.    30 minutes time spent.  This note was created using voice recognition software and may contain grammatical errors.             Route Chart for Scheduling    Med Onc Chart Routing      Follow up with physician 4 weeks. See me in 4 weeks with interval CBC, CMP, LDH, and magnesium prior to initiation of cycle 3 of therapy.   Follow up with [facility]REW 1 week. See the nurse practitioner with interval CBC, BMP, and magnesium prior to cycle 2, day 8 and cycle 2, day 15 of therapy   Labs    Imaging    Pharmacy appointment    Other referrals          Treatment Plan Information   OP VINORELBINE Q4W   Presley Mcdaniel MD   Upcoming Treatment Dates - OP VINORELBINE Q4W    4/12/2022       Pre-Medications       dexAMETHasone tablet 4 mg       Chemotherapy       vinorelbine (NAVELBINE) 40 mg in sodium chloride 0.9% 50 mL chemo infusion  4/19/2022       Pre-Medications       dexAMETHasone tablet 4 mg       Chemotherapy       vinorelbine (NAVELBINE) 40 mg in sodium chloride 0.9% 50 mL chemo infusion  4/26/2022       Pre-Medications       dexAMETHasone tablet 4 mg       Chemotherapy        vinorelbine (NAVELBINE) 40 mg in sodium chloride 0.9% 50 mL chemo infusion  5/10/2022       Pre-Medications       dexAMETHasone tablet 4 mg       Chemotherapy       vinorelbine (NAVELBINE) 40 mg in sodium chloride 0.9% 50 mL chemo infusion    Supportive Plan Information  IV FLUIDS AND ELECTROLYTES   Presley Mcdaniel MD   Upcoming Treatment Dates - IV FLUIDS AND ELECTROLYTES    No upcoming days in selected categories.

## 2022-04-12 ENCOUNTER — INFUSION (OUTPATIENT)
Dept: INFUSION THERAPY | Facility: HOSPITAL | Age: 77
End: 2022-04-12
Attending: INTERNAL MEDICINE
Payer: MEDICARE

## 2022-04-12 VITALS
TEMPERATURE: 99 F | DIASTOLIC BLOOD PRESSURE: 85 MMHG | WEIGHT: 121.5 LBS | HEART RATE: 96 BPM | RESPIRATION RATE: 16 BRPM | HEIGHT: 65 IN | SYSTOLIC BLOOD PRESSURE: 169 MMHG | BODY MASS INDEX: 20.24 KG/M2

## 2022-04-12 DIAGNOSIS — C45.0 MALIGNANT MESOTHELIOMA OF PLEURA: Primary | ICD-10-CM

## 2022-04-12 PROCEDURE — 63600175 PHARM REV CODE 636 W HCPCS: Mod: PN | Performed by: INTERNAL MEDICINE

## 2022-04-12 PROCEDURE — A4216 STERILE WATER/SALINE, 10 ML: HCPCS | Mod: PN | Performed by: INTERNAL MEDICINE

## 2022-04-12 PROCEDURE — 96409 CHEMO IV PUSH SNGL DRUG: CPT | Mod: PN

## 2022-04-12 PROCEDURE — 25000003 PHARM REV CODE 250: Mod: PN | Performed by: INTERNAL MEDICINE

## 2022-04-12 RX ORDER — DEXAMETHASONE 4 MG/1
4 TABLET ORAL
Status: COMPLETED | OUTPATIENT
Start: 2022-04-12 | End: 2022-04-12

## 2022-04-12 RX ORDER — HEPARIN 100 UNIT/ML
500 SYRINGE INTRAVENOUS
Status: DISCONTINUED | OUTPATIENT
Start: 2022-04-12 | End: 2022-04-12 | Stop reason: HOSPADM

## 2022-04-12 RX ORDER — SODIUM CHLORIDE 0.9 % (FLUSH) 0.9 %
10 SYRINGE (ML) INJECTION
Status: DISCONTINUED | OUTPATIENT
Start: 2022-04-12 | End: 2022-04-12 | Stop reason: HOSPADM

## 2022-04-12 RX ADMIN — Medication 500 UNITS: at 03:04

## 2022-04-12 RX ADMIN — DEXAMETHASONE 4 MG: 4 TABLET ORAL at 02:04

## 2022-04-12 RX ADMIN — SODIUM CHLORIDE: 0.9 INJECTION, SOLUTION INTRAVENOUS at 02:04

## 2022-04-12 RX ADMIN — VINORELBINE 40 MG: 10 INJECTION, SOLUTION INTRAVENOUS at 03:04

## 2022-04-12 RX ADMIN — Medication 10 ML: at 03:04

## 2022-04-18 ENCOUNTER — TELEPHONE (OUTPATIENT)
Dept: HEMATOLOGY/ONCOLOGY | Facility: CLINIC | Age: 77
End: 2022-04-18
Payer: MEDICARE

## 2022-04-19 ENCOUNTER — TELEPHONE (OUTPATIENT)
Dept: HEMATOLOGY/ONCOLOGY | Facility: CLINIC | Age: 77
End: 2022-04-19
Payer: MEDICARE

## 2022-04-19 ENCOUNTER — INFUSION (OUTPATIENT)
Dept: INFUSION THERAPY | Facility: HOSPITAL | Age: 77
End: 2022-04-19
Attending: INTERNAL MEDICINE
Payer: MEDICARE

## 2022-04-19 ENCOUNTER — DOCUMENTATION ONLY (OUTPATIENT)
Dept: INFUSION THERAPY | Facility: HOSPITAL | Age: 77
End: 2022-04-19

## 2022-04-19 ENCOUNTER — OFFICE VISIT (OUTPATIENT)
Dept: HEMATOLOGY/ONCOLOGY | Facility: CLINIC | Age: 77
End: 2022-04-19
Payer: MEDICARE

## 2022-04-19 ENCOUNTER — PATIENT MESSAGE (OUTPATIENT)
Dept: HEMATOLOGY/ONCOLOGY | Facility: CLINIC | Age: 77
End: 2022-04-19
Payer: MEDICARE

## 2022-04-19 VITALS
RESPIRATION RATE: 16 BRPM | DIASTOLIC BLOOD PRESSURE: 88 MMHG | HEIGHT: 65 IN | TEMPERATURE: 98 F | SYSTOLIC BLOOD PRESSURE: 170 MMHG | OXYGEN SATURATION: 98 % | WEIGHT: 123.88 LBS | BODY MASS INDEX: 20.64 KG/M2 | HEART RATE: 87 BPM

## 2022-04-19 VITALS
OXYGEN SATURATION: 98 % | RESPIRATION RATE: 16 BRPM | HEART RATE: 102 BPM | SYSTOLIC BLOOD PRESSURE: 148 MMHG | BODY MASS INDEX: 20.64 KG/M2 | WEIGHT: 123.88 LBS | TEMPERATURE: 98 F | DIASTOLIC BLOOD PRESSURE: 78 MMHG | HEIGHT: 65 IN

## 2022-04-19 DIAGNOSIS — C45.0 MALIGNANT MESOTHELIOMA OF PLEURA: Primary | ICD-10-CM

## 2022-04-19 PROCEDURE — 3077F SYST BP >= 140 MM HG: CPT | Mod: CPTII,S$GLB,, | Performed by: NURSE PRACTITIONER

## 2022-04-19 PROCEDURE — 1101F PT FALLS ASSESS-DOCD LE1/YR: CPT | Mod: CPTII,S$GLB,, | Performed by: NURSE PRACTITIONER

## 2022-04-19 PROCEDURE — 3288F PR FALLS RISK ASSESSMENT DOCUMENTED: ICD-10-PCS | Mod: CPTII,S$GLB,, | Performed by: NURSE PRACTITIONER

## 2022-04-19 PROCEDURE — 1126F AMNT PAIN NOTED NONE PRSNT: CPT | Mod: CPTII,S$GLB,, | Performed by: NURSE PRACTITIONER

## 2022-04-19 PROCEDURE — 3078F DIAST BP <80 MM HG: CPT | Mod: CPTII,S$GLB,, | Performed by: NURSE PRACTITIONER

## 2022-04-19 PROCEDURE — A4216 STERILE WATER/SALINE, 10 ML: HCPCS | Mod: PN | Performed by: NURSE PRACTITIONER

## 2022-04-19 PROCEDURE — 1101F PR PT FALLS ASSESS DOC 0-1 FALLS W/OUT INJ PAST YR: ICD-10-PCS | Mod: CPTII,S$GLB,, | Performed by: NURSE PRACTITIONER

## 2022-04-19 PROCEDURE — 3078F PR MOST RECENT DIASTOLIC BLOOD PRESSURE < 80 MM HG: ICD-10-PCS | Mod: CPTII,S$GLB,, | Performed by: NURSE PRACTITIONER

## 2022-04-19 PROCEDURE — 63600175 PHARM REV CODE 636 W HCPCS: Mod: PN | Performed by: NURSE PRACTITIONER

## 2022-04-19 PROCEDURE — 99999 PR PBB SHADOW E&M-EST. PATIENT-LVL V: ICD-10-PCS | Mod: PBBFAC,,, | Performed by: NURSE PRACTITIONER

## 2022-04-19 PROCEDURE — 99999 PR PBB SHADOW E&M-EST. PATIENT-LVL V: CPT | Mod: PBBFAC,,, | Performed by: NURSE PRACTITIONER

## 2022-04-19 PROCEDURE — 25000003 PHARM REV CODE 250: Mod: PN | Performed by: NURSE PRACTITIONER

## 2022-04-19 PROCEDURE — 3077F PR MOST RECENT SYSTOLIC BLOOD PRESSURE >= 140 MM HG: ICD-10-PCS | Mod: CPTII,S$GLB,, | Performed by: NURSE PRACTITIONER

## 2022-04-19 PROCEDURE — 99214 OFFICE O/P EST MOD 30 MIN: CPT | Mod: S$GLB,,, | Performed by: NURSE PRACTITIONER

## 2022-04-19 PROCEDURE — 99214 PR OFFICE/OUTPT VISIT, EST, LEVL IV, 30-39 MIN: ICD-10-PCS | Mod: S$GLB,,, | Performed by: NURSE PRACTITIONER

## 2022-04-19 PROCEDURE — 96409 CHEMO IV PUSH SNGL DRUG: CPT | Mod: PN

## 2022-04-19 PROCEDURE — 3288F FALL RISK ASSESSMENT DOCD: CPT | Mod: CPTII,S$GLB,, | Performed by: NURSE PRACTITIONER

## 2022-04-19 PROCEDURE — 1126F PR PAIN SEVERITY QUANTIFIED, NO PAIN PRESENT: ICD-10-PCS | Mod: CPTII,S$GLB,, | Performed by: NURSE PRACTITIONER

## 2022-04-19 RX ORDER — DEXAMETHASONE 4 MG/1
4 TABLET ORAL
Status: COMPLETED | OUTPATIENT
Start: 2022-04-19 | End: 2022-04-19

## 2022-04-19 RX ORDER — SODIUM CHLORIDE 0.9 % (FLUSH) 0.9 %
10 SYRINGE (ML) INJECTION
Status: CANCELLED | OUTPATIENT
Start: 2022-04-19

## 2022-04-19 RX ORDER — HEPARIN 100 UNIT/ML
500 SYRINGE INTRAVENOUS
Status: DISCONTINUED | OUTPATIENT
Start: 2022-04-19 | End: 2022-04-19 | Stop reason: HOSPADM

## 2022-04-19 RX ORDER — HEPARIN 100 UNIT/ML
500 SYRINGE INTRAVENOUS
Status: CANCELLED | OUTPATIENT
Start: 2022-04-19

## 2022-04-19 RX ORDER — SODIUM CHLORIDE 0.9 % (FLUSH) 0.9 %
10 SYRINGE (ML) INJECTION
Status: DISCONTINUED | OUTPATIENT
Start: 2022-04-19 | End: 2022-04-19 | Stop reason: HOSPADM

## 2022-04-19 RX ORDER — PANTOPRAZOLE SODIUM 40 MG/1
40 TABLET, DELAYED RELEASE ORAL EVERY MORNING
Qty: 90 TABLET | Refills: 3 | Status: SHIPPED | OUTPATIENT
Start: 2022-04-19 | End: 2022-07-20

## 2022-04-19 RX ORDER — DEXAMETHASONE 4 MG/1
4 TABLET ORAL
Status: CANCELLED | OUTPATIENT
Start: 2022-04-19

## 2022-04-19 RX ADMIN — DEXAMETHASONE 4 MG: 4 TABLET ORAL at 11:04

## 2022-04-19 RX ADMIN — SODIUM CHLORIDE: 0.9 INJECTION, SOLUTION INTRAVENOUS at 11:04

## 2022-04-19 RX ADMIN — VINORELBINE 40 MG: 10 INJECTION, SOLUTION INTRAVENOUS at 11:04

## 2022-04-19 RX ADMIN — Medication 10 ML: at 12:04

## 2022-04-19 RX ADMIN — Medication 500 UNITS: at 12:04

## 2022-04-19 NOTE — PROGRESS NOTES
ONCOLOGY NUTRITION   FOLLOW UP VISIT        Yoanna Thapa is a 76 y.o. female.  DATE: 04/19/2022        Oncology Diagnosis: Mesothelioma      REFERRAL FROM:   [] Integrative Oncology   [] Med/Heme Oncology  [] Radiation Oncology  [] Surgical Oncology   [x] Infusion Nurse    [] Routine Nutrition follow up    TREATMENT PLAN:   [] Full treatment plan pending  [x] Chemotherapy  [] Immunotherapy  [] Radiation  [] Concurrent  [] Surgery  [] Treatment complete/post-treatment    ANTHROPOMETRICS:  Wt Readings from Last 10 Encounters:   04/19/22 56.2 kg (123 lb 14.4 oz)   04/19/22 56.2 kg (123 lb 14.4 oz)   04/12/22 55.1 kg (121 lb 7.6 oz)   04/11/22 55.1 kg (121 lb 7.6 oz)   03/29/22 55.7 kg (122 lb 12.7 oz)   03/29/22 55.7 kg (122 lb 12.7 oz)   03/22/22 56.2 kg (123 lb 14.4 oz)   03/22/22 56.2 kg (123 lb 14.4 oz)   03/15/22 55.9 kg (123 lb 3.8 oz)   03/15/22 56 kg (123 lb 5.6 oz)      Weight Changes: has been stable    PHYSICAL EXAM:  Muscle Wasting Observed:  [x] No Deficit   [] Mild Deficit   [] Moderate   [] Severe    INTAKE:  [x] PO Intake [] TF Intake  Current Diet: regular diet  Dietary Patterns:  Eating meals/snacks as tolerated  [x] Oral nutritional supplements: Ensure BID    SYMPTOMS/COMPLAINTS:   [] No nutritional concerns at current  [] Diarrhea                    [] Constipation           [] Nausea                 [] Vomiting                [] Indigestion                [] Reflux              [x] Poor Appetite            [] Anorexia                 [] Early Satiety         [] Gas                       [] Bloating                     [] Dry Mouth    [] Mucositis                   [] Mouth Sores           [] Poor Dentition      [] Difficulty chewing  [] Difficulty Swallowing   [] Pain with swallowing [] Change in taste      [] Change in smell   [] Pain (general)       [] Fatigue                      [] Sleep issues    [] Weight loss  [] other, please specify    Nutrition Re-Assessment Risk: Mild    [x]  Labs reviewed   [x] Meds reviewed    Education Provided:   [] No Education Needed at this time  [] Diarrhea                                              [] Constipation                          [] Nausea/Vomiting  [] Mucositis                [] Dry Mouth    [] Dealing with changes in Taste/Smell  [x] Dealing with Poor Appetite   [] Soft/moist Diet      [] Weight Loss/Gain     [] Weight Maintenance                           [] Indigestion/GERD                 [] Gas/Bloating          [] Foods High/ Low in specific nutrients [] Increasing Calories/Protein   [] Milkshake/Smoothies Recipes   [] Nutrition Supplements                        [] Increasing Fluid Intakes         [] Foods that fight cancer    [] Evidence bases resources                 [] Fermented Foods/Probiotics  [] Mediterranean/Plant Based Diet     [] Other, specify                                   [x] Handouts provided: Poor Appetite      [] Samples provided     RD NOTE:  RD met with patient and her  at chairside during infusion treatment. Pt states although maintaining weight, she feels her appetite has decreased. She is forcing herself to eat. RD discussed many tips on dealing with poor appetite and how to consume adequate caloric intake.    RD Goals:   [x] Weight stable                  [] Weight gain                      [] Weight Loss                               [x] Continue adequate Kcal/protein   [] Increase Kcal/protein      [] Adjust Tube-feeding Rx   [] Tolerate Tube Feedings             [] Increase tube feedings to goal     [] Tolerate Supplements     [x] Symptom Improvement   [] Understand nutrition Education  [] Offer supportive visits   [] other, please specify    RECOMMENDATIONS:  1. Eat smaller, more frequent meals  2. Continue Ensure BID and increase to TID as needed    Follow up: Next infusion or PRN throughout tx    Sandra Lyons, MS, RD, LDN  04/19/2022  3:08 PM

## 2022-04-19 NOTE — TELEPHONE ENCOUNTER
I spoke with Yoanna and she asked why was she seeing NP? I explained in detail what IO is used for and how we can help treat side effects. She asked why is she now just hearing about these services when she has had cancer a year ago? I told her that we are a new department and offer xyz services. She voiced understanding and said she wants to get thru her tx today then will make a decision and call me if she wants to r/s her appt.        ----- Message from Ivy Gibbs sent at 4/18/2022  4:27 PM CDT -----  Regarding: patient has questions regarding appt., scheduled with Krista Pitt  Type: Needs Medical Advice  Who Called: patient   Best Call Back Number: [phone]  Additional Information: patient request call back regarding rescheduling appt., with Krista Pitt, please advise.

## 2022-04-19 NOTE — PROGRESS NOTES
Name: Yoanna Thapa  : 1945  MRN: [id number]  CC: education for chemotherapy     HPI:   Yoanna Thapa is a 76 y.o. female who presents to the clinic today for evaluation prior to Cycle 2, D8 of Navelbine.    Originally set up for:  Navelbine IV on days 1, 8, 15 of 28 day cycle for palliation of malignant pleural mesothelioma.      Recent PET/CT showed progression of disease through single agent Gemzar.     Navelbine started:  03/15/2022     PET impression from 3/07/22:    1. Detrimental change since 2021 concerning for progressive neoplastic disease as described above  2.  Extensive osseous hypermetabolic disease which could obscure underlying lesions or even diffuse osseous abnormality.  This is likely treatment related  3. High-density liver, please correlate for possible liver disease  4. A right renal stone is noted     Had no additional neurologic sx, events or weakness. She has appointment with neurology scheduled 2022  Appetite has been fair; weight stable at 123.8 lbs (120).   Denies fevers, chills, rigors.   Denies CP, palpitations, SOB, productive cough, abdominal pain, changes in bowel habits, urinary discomfort or frequency changes.   No new lumps, bumps or swelling. Denies PN, bruising or bleeding.      Prior History:   In the  of  she began experiencing difficulties with breathing and SOB. Was found to have pleural effusion. 2021 effusion recurred and she underwent a pleural biopsy which revealed malignant mesothelioma.   2021 Pleurx catheter was placed  2021 Staging laparotomy done. No diaphragmatic involvement or intra-abdominal disease in association with her diagnosis of malignant mesothelioma.   03/10/2021 to 2021- Neoadjuvant Cisplatin/Alimta .Performance status too poor to allow for surgical intervention.  Had once cycle of maintenance Alimta; tolerated poorly.  21 to 11/10/21- Keytruda; PET on 21 showed progressive  disease  12/09/21- 3/9/2022- Single agent Gemzar; PET/CT on 3/7/2022 showed progression of disease   03/15/2022:  Start of single agent Navelbine         Oncology History   Malignant mesothelioma of pleura   1/29/2021 Initial Diagnosis     Malignant mesothelioma of pleura      3/10/2021 - 7/26/2021 Chemotherapy     Treatment Summary   Plan Name: OP NSCLC PEMETREXED + CISPLATIN Q3W  Treatment Goal: Curative  Status: Inactive  Start Date: 3/10/2021  End Date: 7/26/2021  Provider: Presley Mcdaniel MD  Chemotherapy: CISplatin (PLATINOL) 75 mg/m2 = 124 mg in sodium chloride 0.9% 500 mL chemo infusion, 75 mg/m2 = 124 mg, Intravenous, Clinic/HOD 1 time, 4 of 4 cycles  Administration: 124 mg (3/10/2021), 124 mg (4/21/2021), 124 mg (5/12/2021), 124 mg (3/31/2021)  mannitol 25% 12.5 g in dextrose 5 % 100 mL infusion, 12.5 g, Intravenous, Clinic/HOD 1 time, 4 of 4 cycles  Administration: 12.5 g (3/10/2021), 12.5 g (3/31/2021), 12.5 g (4/21/2021), 12.5 g (5/12/2021)  PEMEtrexed (ALIMTA) 800 mg in sodium chloride 0.9% 100 mL chemo infusion, 825 mg, Intravenous, Clinic/HOD 1 time, 5 of 11 cycles  Administration: 800 mg (3/10/2021), 825 mg (4/21/2021), 800 mg (5/12/2021), 800 mg (7/26/2021), 800 mg (3/31/2021)      9/9/2021 - 11/10/2021 Chemotherapy     Treatment Summary   Plan Name: OP PEMBROLIZUMAB 200MG Q3W  Treatment Goal: Palliative  Status: Inactive  Start Date: 9/9/2021  End Date: 11/10/2021  Provider: Presley Mcdaniel MD  Chemotherapy: pembrolizumab (KEYTRUDA) 200 mg in sodium chloride 0.9% 108 mL infusion, 200 mg, Intravenous, Clinic/HOD 1 time, 4 of 6 cycles  Administration: 200 mg (9/9/2021), 200 mg (9/29/2021), 200 mg (10/20/2021), 200 mg (11/10/2021)      12/9/2021 - 2/25/2022 Chemotherapy     Treatment Summary   Plan Name: OP GEMCITABINE (1000 MG/M2) Q3W  Treatment Goal: Palliative  Status: Inactive  Start Date: 12/9/2021  End Date: 2/25/2022  Provider: Presley Mcdaniel MD  Chemotherapy: gemcitabine (GEMZAR) 1,600 mg  in sodium chloride 0.9% 250 mL chemo infusion, 1,610 mg, Intravenous, Clinic/HOD 1 time, 4 of 4 cycles  Administration: 1,600 mg (12/9/2021), 1,600 mg (12/16/2021), 1,600 mg (1/6/2022), 1,600 mg (1/13/2022), 1,600 mg (1/27/2022), 1,600 mg (2/2/2022), 1,600 mg (2/17/2022), 1,600 mg (2/24/2022)      3/15/2022 -  Chemotherapy     Treatment Summary   Plan Name: OP VINORELBINE Q4W  Treatment Goal: Palliative  Status: Active  Start Date: 3/15/2022 (Planned)  End Date: 1/31/2023 (Planned)  Provider: Presley Mcdaniel MD  Chemotherapy: dexAMETHasone tablet 4 mg, 4 mg, Oral, Clinic/HOD 1 time, 0 of 12 cycles  vinorelbine (NAVELBINE) 40 mg in sodium chloride 0.9% 50 mL chemo infusion, 25 mg/m2 = 40 mg, Intravenous, Clinic/HOD 1 time, 0 of 12 cycles                    Past Medical History:   Diagnosis Date    Anxiety      Breast cancer 2009     Right: Lumpectomy and XRT    Cancer 2009     breast    Cancer of pleura, secondary 1/27/2021    Depression      Disorder of kidney and ureter      GERD (gastroesophageal reflux disease)      Hyperlipidemia      Hypertension      Malignant pleural effusion 1/27/2021    Symptomatic anemia 6/15/2021               Past Surgical History:   Procedure Laterality Date    APPENDECTOMY        BACK SURGERY        BREAST LUMPECTOMY Right      COSMETIC SURGERY        DIAGNOSTIC LAPAROSCOPY N/A 2/18/2021     Procedure: LAPAROSCOPY, DIAGNOSTIC;  Surgeon: Aravind Novak MD;  Location[facility] OR [address] [place] [address];  Service: Colon and Rectal;  Laterality: N/A;    ESOPHAGEAL DILATION N/A 5/20/2021     Procedure: DILATION, ESOPHAGUS;  Surgeon: HO Machuca MD;  Location: [facility];  Service: Endoscopy;  Laterality: N/A;    ESOPHAGOGASTRODUODENOSCOPY N/A 5/20/2021     Procedure: EGD (ESOPHAGOGASTRODUODENOSCOPY);  Surgeon: HO Machuca MD;  Location: [facility];  Service: Endoscopy;  Laterality: N/A;    HYSTERECTOMY        INSERTION OF SUBCUTANEOUS PORT        INSERTION OF  TUNNELED CENTRAL VENOUS CATHETER (CVC) WITH SUBCUTANEOUS PORT   2/18/2021     Procedure: INSERTION, PORT-A-CATH;  Surgeon: Aravind Novak MD;  Location: [facility] OR [address] [place] [address];  Service: Colon and Rectal;;    SPINE SURGERY        THORACENTESIS Right 12/28/2020     [place]    THORACENTESIS Right 01/24/2021     [facility]    TUBE THORACOTOMY Right 2/1/2021     Procedure: INSERTION, CATHETER, INTERCOSTAL, FOR DRAINAGE;  Surgeon: Esdras Jarrett MD;  Location: [facility] OR;  Service: Cardiothoracic;  Laterality: Right;    VIDEO-ASSISTED THORACOSCOPIC SURGERY (VATS) Right 1/27/2021     Procedure: VATS (VIDEO-ASSISTED THORACOSCOPIC SURGERY) - Pleural biopsy ;  Surgeon: Esdras Jarrett MD;  Location: [facility] OR;  Service: Cardiovascular;  Laterality: Right;         No family history on file.     Social History            Socioeconomic History    Marital status:     Number of children: 1   Occupational History    Occupation: retired   Tobacco Use    Smoking status: Never Smoker    Smokeless tobacco: Never Used   Substance and Sexual Activity    Alcohol use: Not Currently       Comment: occasionally    Drug use: Never    Sexual activity: Yes       Partners: Male      Social Determinants of Health          Financial Resource Strain: Low Risk     Difficulty of Paying Living Expenses: Not hard at all   Food Insecurity: No Food Insecurity    Worried About Running Out of Food in the Last Year: Never true    Ran Out of Food in the Last Year: Never true   Transportation Needs: No Transportation Needs    Lack of Transportation (Medical): No    Lack of Transportation (Non-Medical): No   Physical Activity: Insufficiently Active    Days of Exercise per Week: 5 days    Minutes of Exercise per Session: 20 min   Stress: Stress Concern Present    Feeling of Stress : To some extent   Social Connections: Moderately Integrated    Frequency of Communication with Friends and Family: More than three times a week     "Frequency of Social Gatherings with Friends and Family: More than three times a week    Attends Jainism Services: More than 4 times per year    Active Member of Clubs or Organizations: No    Attends Club or Organization Meetings: Never    Marital Status:    Housing Stability: Low Risk     Unable to Pay for Housing in the Last Year: No    Number of Places Lived in the Last Year: 1    Unstable Housing in the Last Year: No               Review of patient's allergies indicates:   Allergen Reactions    Iodine and iodide containing products Anaphylaxis and Swelling       Dye causes Blisters.  Pt tolerates shrimp and betadine.    Sulfa (sulfonamide antibiotics) Anaphylaxis and Hives    Adhesive Other (See Comments)       Blister     Blisters   Pt request paper tape.    Iodinated contrast media Blisters         Review of Systems   Constitutional: Negative for chills, decreased appetite and fever.   Eyes: Negative for visual disturbance.   Cardiovascular: Negative for chest pain.   Respiratory: Positive for shortness of breath (with exertion ). Negative for sputum production.    Hematologic/Lymphatic: Negative for adenopathy. Does not bruise/bleed easily.   Skin: Negative for itching and rash.   Musculoskeletal: Negative for arthritis and myalgias.   Gastrointestinal: Positive for constipation. Negative for change in bowel habit, diarrhea, hematochezia, nausea and vomiting.   Genitourinary: Negative for dysuria and hematuria.   Neurological: Negative for headaches and light-headedness.            Objective:    Weight:  Gain of 2#  In 2 weeks  Vitals:    04/19/22 1011   BP: (!) 148/78   BP Location: Left arm   Patient Position: Sitting   BP Method: Medium (Automatic)   Pulse: 102   Resp: 16   Temp: 97.5 °F (36.4 °C)   TempSrc: Temporal   SpO2: 98%   Weight: 56.2 kg (123 lb 14.4 oz)   Height: 5' 5" (1.651 m)       Physical Exam  Vitals reviewed.   Constitutional:       General: She is not in acute " distress.     Appearance: She is not diaphoretic.   HENT:      Head: Normocephalic and atraumatic.   Cardiovascular:      Rate and Rhythm: Normal rate and regular rhythm.      Heart sounds: Normal heart sounds.   Pulmonary:      Effort: Pulmonary effort is normal. No respiratory distress.      Breath sounds: Wheezing present.   Abdominal:      General: There is no distension.   Musculoskeletal:      Right lower leg: No edema.      Left lower leg: No edema.   Skin:     General: Skin is warm and dry.      Findings: No bruising or rash.   Neurological:      Mental Status: She is alert and oriented to person, place, and time.      Gait: Gait normal.   Psychiatric:         Mood and Affect: Mood normal.         Behavior: Behavior normal.         Judgment: Judgment normal.           Current Outpatient Medications on File Prior to Visit   Medication Sig    acetaminophen (TYLENOL) 500 MG tablet Take 1,000 mg by mouth every 6 (six) hours as needed for Pain.    albuterol (PROVENTIL/VENTOLIN HFA) 90 mcg/actuation inhaler Inhale 2 puffs into the lungs every 6 (six) hours as needed for Wheezing. Rescue    ALPRAZolam (XANAX) 0.25 MG tablet Take 1 tablet (0.25 mg total) by mouth 3 (three) times daily as needed for Anxiety.    amLODIPine (NORVASC) 5 MG tablet Take 1 tablet (5 mg total) by mouth once daily.    ascorbic acid, vitamin C, (VITAMIN C) 500 MG tablet Take 500 mg by mouth every morning.    atorvastatin (LIPITOR) 10 MG tablet Take 10 mg by mouth every morning.     cyclobenzaprine (FLEXERIL) 10 MG tablet Take 10 mg by mouth daily as needed.     docusate sodium (COLACE) 100 MG capsule Take 100 mg by mouth 2 (two) times daily as needed for Constipation.    HYDROcodone-acetaminophen (NORCO) 5-325 mg per tablet Take 1 tablet by mouth every 6 (six) hours as needed for Pain.    LIDOcaine-prilocaine (EMLA) cream Apply topically as needed.    loratadine (CLARITIN) 10 mg tablet Take 1 tablet by mouth once daily.     losartan (COZAAR) 100 MG tablet Take 100 mg by mouth every morning.     megestroL (MEGACE ES) 625 mg/5 mL (125 mg/mL) Susp Take 5 mLs (625 mg total) by mouth once daily.    megestroL (MEGACE) 400 mg/10 mL (40 mg/mL) Susp Take 200 mg by mouth once daily.    multivitamin capsule Take 1 capsule by mouth once daily.    ondansetron (ZOFRAN-ODT) 4 MG TbDL 4 mg every 6 (six) hours.    pantoprazole (PROTONIX) 40 MG tablet Take 40 mg by mouth every morning.     paroxetine (PAXIL) 20 MG tablet Take 1 tablet by mouth every morning.    potassium chloride (KLOR-CON) 10 MEQ TbSR TAKE 1 TABLET (10 MEQ) BY MOUTH 1 TIME A DAY    potassium chloride SA (K-DUR,KLOR-CON) 20 MEQ tablet Take 1 tablet (20 mEq total) by mouth 2 (two) times daily. (Patient taking differently: Take 20 mEq by mouth once daily.)    triamcinolone acetonide 0.025% (KENALOG) 0.025 % cream Apply topically 2 (two) times daily.    triamcinolone acetonide 0.025% (KENALOG) 0.025 % Oint Apply topically 2 (two) times daily. Apply to affected area twice daily    venlafaxine (EFFEXOR XR) 75 MG 24 hr capsule Take 2 capsules (150 mg total) by mouth once daily.    water Liqd 150 mL with MILK OF MAGNESIA 400 mg/5 mL Susp 400 mg, diphenhydrAMINE 12.5 mg/5 mL Elix 60 mg, nystatin 100,000 unit/mL Susp 500,000 Units SWISH & SWALLOW 5 ML'S EVERY 4 HOURS AS NEEDED FOR MOUTH & THROAT PAIN    folic acid (FOLVITE) 1 MG tablet Take 1 tablet (1 mg total) by mouth once daily.    gabapentin (NEURONTIN) 300 MG capsule Take 1 capsule (300 mg total) by mouth 2 (two) times daily.          Current Facility-Administered Medications on File Prior to Visit   Medication    ceFAZolin injection 2 g      Lab Results   Component Value Date    WBC 9.64 04/19/2022    RBC 3.70 (L) 04/19/2022    HGB 11.9 (L) 04/19/2022    HCT 36.3 (L) 04/19/2022    MCV 98 04/19/2022    MCH 32.2 (H) 04/19/2022    MCHC 32.8 04/19/2022    RDW 13.1 04/19/2022     04/19/2022    MPV 10.7 04/19/2022    GRAN  6.5 04/19/2022    GRAN 67.8 04/19/2022    LYMPH 1.6 04/19/2022    LYMPH 16.1 (L) 04/19/2022    MONO 1.1 (H) 04/19/2022    MONO 11.1 04/19/2022    EOS 0.2 04/19/2022    BASO 0.15 04/19/2022    EOSINOPHIL 2.4 04/19/2022    BASOPHIL 1.6 04/19/2022     BMP  Lab Results   Component Value Date     04/19/2022    K 4.6 04/19/2022     04/19/2022    CO2 25 04/19/2022    BUN 27 (H) 04/19/2022    CREATININE 1.3 04/19/2022    CALCIUM 10.0 04/19/2022    ANIONGAP 12 04/19/2022    ESTGFRAFRICA 46 (A) 04/19/2022    EGFRNONAA 40 (A) 04/19/2022     Magnesium:  1.9    CT/PET SCAN ROUTINE  Narrative: EXAMINATION:  PET-CT fusion     TECHNIQUE:  PET CT fusion imaging was obtained from the level of the skull base to the level of the proximal thighs with utilization of 10.4mCi of FDG glucose.  A CT was performed for attenuation correction and anatomical correlation alone.  The left antecubital fossa was the injection site     COMPARISON:  11/29/2021     FINDINGS:  Head and neck:     A markedly hypermetabolic right parotid mass is noted image seventeen sequence 603 with an SUV to 54.5 slightly less than on the prior with a measurement of 12 by 8 mm image 18 unchanged when measured in a similar manner.     A heterogeneous irregular thyroid gland is noted with an enlarged right lobe in comparison with the left similar to on the prior.  Hypermetabolic activity is noted at the lower pole of the left lobe of the thyroid gland in particular within SUV of 3.2.  This appears similar to on the prior.  Correlation with ultrasound may be of use.  Underlying lesion is not excluded.     Thorax:     A new focus of hypermetabolic activity is noted along right at the lung apex image 58 since the prior     A pretracheal image 67 and paratracheal focus of hypermetabolic activity that appear to have been present on the prior.     New hypermetabolic activity is noted in a pre cardial location image 75 as well as anterior to the right mainstem  bronchus image 75 since the prior.  Paraesophageal hypermetabolic activity is noted progressed since the prior along the pleural surface on the right.     At the mid right hemithorax image 68 hypermetabolic activity is noted along the pleura to 12.0 similar to on the prior     Subcarinal hypermetabolic activity is noted image 86 with an SUV of 9.9 slightly elevated since the prior a measurement of 17 by 17 mm is noted slightly larger than on the prior where a measurement of 16 x 12 mm is noted in this region     Abdomen and pelvis:     No new foci of hypermetabolic activity are noted within the abdomen and pelvis.  A nonobstructive right renal stone appears to be present of 7 mm.     Increased liver density is noted as can be seen with liver disease, amiodarone exposure, glycogen storage diseases.  Please clinically correlate.     Musculoskeletal:     Extensive osseous hypermetabolic activity is noted with focal regions identified.  This appears diffuse which favors post treatment change but this could easily obscure underlying disease or even diffuse osseous metastatic disease  Impression: 1. Detrimental change since 11/29/2021 concerning for progressive neoplastic disease as described above  2.  Extensive osseous hypermetabolic disease which could obscure underlying lesions or even diffuse osseous abnormality.  This is likely treatment related  3. High-density liver, please correlate for possible liver disease  4. A right renal stone is noted     Electronically signed by:         Andres Niño MD  Date:                                        03/07/2022  Time:                                       15:28        All pertinent labs and imaging reviewed.     Assessment:       1. Malignant mesothelioma of pleura       Plan:   Malignant mesothelioma of pleura  - Proceed with C2D8 of Navelbine today.     Follow-up:  Follow up in 1 week with MALACHI Reyes NP for evaluation prior to C2 D8 with labs:  CBC, BMP, MG     Patient  queried and all questions were answered.    Assessment/Plan reviewed and approved by Dr. Montejo.          Route Chart for Scheduling    Med Onc Chart Routing      Follow up with physician .     Follow up with DREW 1 week. Proceed with C2D8 tx today; f/u in 1 week with MALACHI Reyes NP with CBC, BMP, Magnesium for evaluation prior to C2D15 Navelbine.   Labs CBC and magnesium   Lab interval:  BMP prior to evaluation   Imaging    Pharmacy appointment No pharmacy appointment needed      Other referrals No additional referrals needed         Treatment Plan Information   OP VINORELBINE Q4W   Presley Mcdaniel MD   Upcoming Treatment Dates - OP VINORELBINE Q4W    4/19/2022       Pre-Medications       dexAMETHasone tablet 4 mg       Chemotherapy       vinorelbine (NAVELBINE) 40 mg in sodium chloride 0.9% 50 mL chemo infusion  4/26/2022       Pre-Medications       dexAMETHasone tablet 4 mg       Chemotherapy       vinorelbine (NAVELBINE) 40 mg in sodium chloride 0.9% 50 mL chemo infusion  5/10/2022       Pre-Medications       dexAMETHasone tablet 4 mg       Chemotherapy       vinorelbine (NAVELBINE) 40 mg in sodium chloride 0.9% 50 mL chemo infusion  5/17/2022       Pre-Medications       dexAMETHasone tablet 4 mg       Chemotherapy       vinorelbine (NAVELBINE) 40 mg in sodium chloride 0.9% 50 mL chemo infusion    Supportive Plan Information  IV FLUIDS AND ELECTROLYTES   Presley Mcdaniel MD   Upcoming Treatment Dates - IV FLUIDS AND ELECTROLYTES    No upcoming days in selected categories.

## 2022-04-25 ENCOUNTER — TELEPHONE (OUTPATIENT)
Dept: HEMATOLOGY/ONCOLOGY | Facility: CLINIC | Age: 77
End: 2022-04-25
Payer: MEDICARE

## 2022-04-25 NOTE — TELEPHONE ENCOUNTER
Scheduled lab appt and linked appropriate labs needed. Patient notified.   ----- Message from Larisa Quintanilla Patient Care Assistant sent at 4/25/2022 10:43 AM CDT -----  Contact: steve  Type: Needs Medical Advice  Who Called:  steve  Best Call Back Number: [phone]    Additional Information: steve states the doctor was suppose to add on blood work  , please call steve to discuss further , thank you

## 2022-04-26 ENCOUNTER — OFFICE VISIT (OUTPATIENT)
Dept: HEMATOLOGY/ONCOLOGY | Facility: CLINIC | Age: 77
End: 2022-04-26
Payer: MEDICARE

## 2022-04-26 ENCOUNTER — INFUSION (OUTPATIENT)
Dept: INFUSION THERAPY | Facility: HOSPITAL | Age: 77
End: 2022-04-26
Attending: INTERNAL MEDICINE
Payer: MEDICARE

## 2022-04-26 VITALS
TEMPERATURE: 97 F | BODY MASS INDEX: 20.49 KG/M2 | HEART RATE: 95 BPM | HEIGHT: 65 IN | RESPIRATION RATE: 16 BRPM | SYSTOLIC BLOOD PRESSURE: 166 MMHG | DIASTOLIC BLOOD PRESSURE: 76 MMHG | OXYGEN SATURATION: 96 % | WEIGHT: 123 LBS

## 2022-04-26 VITALS
TEMPERATURE: 97 F | HEIGHT: 65 IN | HEART RATE: 106 BPM | RESPIRATION RATE: 16 BRPM | WEIGHT: 123 LBS | BODY MASS INDEX: 20.49 KG/M2 | DIASTOLIC BLOOD PRESSURE: 81 MMHG | OXYGEN SATURATION: 96 % | SYSTOLIC BLOOD PRESSURE: 145 MMHG

## 2022-04-26 DIAGNOSIS — C45.0 MALIGNANT MESOTHELIOMA OF PLEURA: Primary | ICD-10-CM

## 2022-04-26 DIAGNOSIS — R10.10 PAIN OF UPPER ABDOMEN: ICD-10-CM

## 2022-04-26 DIAGNOSIS — G89.3 CANCER RELATED PAIN: ICD-10-CM

## 2022-04-26 PROCEDURE — 96409 CHEMO IV PUSH SNGL DRUG: CPT | Mod: PN

## 2022-04-26 PROCEDURE — 3079F PR MOST RECENT DIASTOLIC BLOOD PRESSURE 80-89 MM HG: ICD-10-PCS | Mod: CPTII,S$GLB,,

## 2022-04-26 PROCEDURE — A4216 STERILE WATER/SALINE, 10 ML: HCPCS | Mod: PN

## 2022-04-26 PROCEDURE — 63600175 PHARM REV CODE 636 W HCPCS: Mod: PN

## 2022-04-26 PROCEDURE — 3288F FALL RISK ASSESSMENT DOCD: CPT | Mod: CPTII,S$GLB,,

## 2022-04-26 PROCEDURE — 1125F PR PAIN SEVERITY QUANTIFIED, PAIN PRESENT: ICD-10-PCS | Mod: CPTII,S$GLB,,

## 2022-04-26 PROCEDURE — 1125F AMNT PAIN NOTED PAIN PRSNT: CPT | Mod: CPTII,S$GLB,,

## 2022-04-26 PROCEDURE — 1101F PR PT FALLS ASSESS DOC 0-1 FALLS W/OUT INJ PAST YR: ICD-10-PCS | Mod: CPTII,S$GLB,,

## 2022-04-26 PROCEDURE — 3077F PR MOST RECENT SYSTOLIC BLOOD PRESSURE >= 140 MM HG: ICD-10-PCS | Mod: CPTII,S$GLB,,

## 2022-04-26 PROCEDURE — 99999 PR PBB SHADOW E&M-EST. PATIENT-LVL IV: CPT | Mod: PBBFAC,,,

## 2022-04-26 PROCEDURE — 99999 PR PBB SHADOW E&M-EST. PATIENT-LVL IV: ICD-10-PCS | Mod: PBBFAC,,,

## 2022-04-26 PROCEDURE — 3079F DIAST BP 80-89 MM HG: CPT | Mod: CPTII,S$GLB,,

## 2022-04-26 PROCEDURE — 3288F PR FALLS RISK ASSESSMENT DOCUMENTED: ICD-10-PCS | Mod: CPTII,S$GLB,,

## 2022-04-26 PROCEDURE — 25000003 PHARM REV CODE 250: Mod: PN

## 2022-04-26 PROCEDURE — 1101F PT FALLS ASSESS-DOCD LE1/YR: CPT | Mod: CPTII,S$GLB,,

## 2022-04-26 PROCEDURE — 3077F SYST BP >= 140 MM HG: CPT | Mod: CPTII,S$GLB,,

## 2022-04-26 PROCEDURE — 99214 PR OFFICE/OUTPT VISIT, EST, LEVL IV, 30-39 MIN: ICD-10-PCS | Mod: S$GLB,,,

## 2022-04-26 PROCEDURE — 99214 OFFICE O/P EST MOD 30 MIN: CPT | Mod: S$GLB,,,

## 2022-04-26 RX ORDER — DEXAMETHASONE 4 MG/1
4 TABLET ORAL
Status: COMPLETED | OUTPATIENT
Start: 2022-04-26 | End: 2022-04-26

## 2022-04-26 RX ORDER — HYDROCODONE BITARTRATE AND ACETAMINOPHEN 5; 325 MG/1; MG/1
1 TABLET ORAL EVERY 6 HOURS PRN
Qty: 30 TABLET | Refills: 0 | Status: SHIPPED | OUTPATIENT
Start: 2022-04-26

## 2022-04-26 RX ORDER — SODIUM CHLORIDE 0.9 % (FLUSH) 0.9 %
10 SYRINGE (ML) INJECTION
Status: CANCELLED | OUTPATIENT
Start: 2022-04-26

## 2022-04-26 RX ORDER — DEXAMETHASONE 4 MG/1
4 TABLET ORAL
Status: CANCELLED | OUTPATIENT
Start: 2022-04-26

## 2022-04-26 RX ORDER — HEPARIN 100 UNIT/ML
500 SYRINGE INTRAVENOUS
Status: CANCELLED | OUTPATIENT
Start: 2022-04-26

## 2022-04-26 RX ORDER — HEPARIN 100 UNIT/ML
500 SYRINGE INTRAVENOUS
Status: DISCONTINUED | OUTPATIENT
Start: 2022-04-26 | End: 2022-04-26 | Stop reason: HOSPADM

## 2022-04-26 RX ORDER — SODIUM CHLORIDE 0.9 % (FLUSH) 0.9 %
10 SYRINGE (ML) INJECTION
Status: DISCONTINUED | OUTPATIENT
Start: 2022-04-26 | End: 2022-04-26 | Stop reason: HOSPADM

## 2022-04-26 RX ADMIN — Medication 500 UNITS: at 02:04

## 2022-04-26 RX ADMIN — Medication 10 ML: at 02:04

## 2022-04-26 RX ADMIN — DEXAMETHASONE 4 MG: 4 TABLET ORAL at 02:04

## 2022-04-26 RX ADMIN — SODIUM CHLORIDE: 0.9 INJECTION, SOLUTION INTRAVENOUS at 02:04

## 2022-04-26 RX ADMIN — VINORELBINE 40 MG: 10 INJECTION, SOLUTION INTRAVENOUS at 02:04

## 2022-04-26 NOTE — PROGRESS NOTES
Subjective:     Name: Yoanna Thapa  : 1945  MRN: [id number]    Chief Complaint: Follow-up, mesothelioma    HPI:   Yoanna Thapa is a 76 y.o. female known to Dr. Mcdaniel who presents for evaluation of pleural mesothelioma and consideration of C2 D15 Navelbine . She is accompanied by her .    Patient has occasional bloating and abdominal discomfort, she takes PRN laxatives and Gas-x; these seem to help symptoms. Also has a persistent RUQ pain, there are no aggravating factors. Tylenol helps but the pain is becoming more persistent. Otherwise, she is tolerating Navelbine well.   No weakness, facial drooping, problems with coordination. Appetite has been good; weight stable at 123 lbs. Endorses chronic cough, she is taking OTC Robitussin but feels the cough is getting worse, at times productive with a light yellow colored sputum.  Denies fevers, chills, rigors. Denies CP, palpitations, SOB, changes in bowel habits, urinary discomfort or frequency changes. No new lumps, bumps or swelling. Denies bruising or bleeding.     Prior History:   In the  of  she began experiencing difficulties with breathing and SOB. Was found to have pleural effusion. 2021 effusion recurred and she underwent a pleural biopsy which revealed malignant mesothelioma.   2021 Pleurx catheter was placed  2021 Staging laparotomy done. No diaphragmatic involvement or intra-abdominal disease in association with her diagnosis of malignant mesothelioma.   03/10/2021 to 2021- Neoadjuvant Cisplatin/Alimta .Performance status too poor to allow for surgical intervention.  Had once cycle of maintenance Alimta; tolerated poorly.  21 to 11/10/21- Keytruda; PET on 21 showed progressive disease  21 to 2022- Single agent Gemzar.   PET/CT on 3/07/2022 shows progression of disease.  3/15/2022- Began Navelbine on D1, 8, 15 of a 28 day cycle     Past Medical History:   Diagnosis Date     Anxiety     Breast cancer 2009    Right: Lumpectomy and XRT    Cancer 2009    breast    Cancer of pleura, secondary 1/27/2021    Depression     Disorder of kidney and ureter     GERD (gastroesophageal reflux disease)     Hyperlipidemia     Hypertension     Malignant pleural effusion 1/27/2021    Symptomatic anemia 6/15/2021       Past Surgical History:   Procedure Laterality Date    APPENDECTOMY      BACK SURGERY      BREAST LUMPECTOMY Right     COSMETIC SURGERY      DIAGNOSTIC LAPAROSCOPY N/A 2/18/2021    Procedure: LAPAROSCOPY, DIAGNOSTIC;  Surgeon: Aravind Novak MD;  Location: [facility] OR 2ND FLR;  Service: Colon and Rectal;  Laterality: N/A;    ESOPHAGEAL DILATION N/A 5/20/2021    Procedure: DILATION, ESOPHAGUS;  Surgeon: HO Machuca MD;  Location: [facility] ENDO;  Service: Endoscopy;  Laterality: N/A;    ESOPHAGOGASTRODUODENOSCOPY N/A 5/20/2021    Procedure: EGD (ESOPHAGOGASTRODUODENOSCOPY);  Surgeon: HO Machuca MD;  Location: [facility] END[facility];  Service: Endoscopy;  Laterality: N/A;    HYSTERECTOMY      INSERTION OF SUBCUTANEOUS PORT      INSERTION OF TUNNELED CENTRAL VENOUS CATHETER (CVC) WITH SUBCUTANEOUS PORT  2/18/2021    Procedure: INSERTION, PORT-A-CATH;  Surgeon: Aravind Novak MD;  Location: [facility] OR 2ND FLR;  Service: Colon and Rectal;;    SPINE SURGERY      THORACENTESIS Right 12/28/2020    [place]    TH[facility]ACENTESIS Right [facility]/24/2021    [facility]    TUBE THORACOTOMY Right 2/1/2021    Procedure: INSERTION, CATHETER, INTERCOSTAL, FOR DRAINAGE;  Surgeon: Esdras Jarrett MD;  Location: [facility] OR;  Service: Cardiothoracic;  Laterality: Right;    VIDEO-ASSISTED THORACOSCOPIC SURGERY (VATS) Right 1/27/2021    Procedure: VATS (VIDEO-ASSISTED THORACOSCOPIC SURGERY) - Pleural biopsy ;  Surgeon: Esdras Jarrett MD;  Location: [facility] OR;  Service: Cardiovascular;  Laterality: Right;           Review of patient's allergies indicates:   Allergen Reactions    Iodine and iodide  "containing products Anaphylaxis and Swelling     Dye causes Blisters.  Pt tolerates shrimp and betadine.    Sulfa (sulfonamide antibiotics) Anaphylaxis and Hives    Adhesive Other (See Comments)     Blister    Blisters   Pt request paper tape.    Iodinated contrast media Blisters       Review of Systems   Constitutional: Negative for chills, decreased appetite, diaphoresis, fever, malaise/fatigue and night sweats.   HENT: Negative for congestion and sore throat.    Eyes: Negative for visual disturbance.   Cardiovascular: Negative for chest pain, dyspnea on exertion, leg swelling and palpitations.   Respiratory: Positive for wheezing (at night) and cough. Negative for shortness of breath.    Hematologic/Lymphatic: Negative for adenopathy and bleeding problem. Does not bruise/bleed easily.   Skin: Negative for dry skin, itching and rash.   Musculoskeletal: Negative for joint pain, joint swelling and myalgias.   Gastrointestinal: Positive for abdominal pain (RUQ). Negative for constipation, diarrhea, heartburn, hematochezia, nausea and vomiting.   Genitourinary: Negative for dysuria, frequency, hematuria and urgency.   Neurological: Negative for headaches, numbness, paresthesias and weakness.     Objective:     Vitals:    04/26/22 1329   BP: (!) 145/81   BP Location: Left arm   Patient Position: Sitting   BP Method: Medium (Automatic)   Pulse: 106   Resp: 16   Temp: 97.3 °F (36.3 °C)   TempSrc: Temporal   SpO2: 96%   Weight: 55.8 kg (123 lb 0.3 oz)   Height: 5' 5" (1.651 m)        Physical Exam  Vitals reviewed.   Constitutional:       General: She is not in acute distress.     Appearance: She is not diaphoretic.   HENT:      Head: Normocephalic and atraumatic.      Mouth/Throat:      Mouth: Mucous membranes are moist.      Pharynx: No oropharyngeal exudate or posterior oropharyngeal erythema.   Eyes:      General: No scleral icterus.     Pupils: Pupils are equal, round, and reactive to light.   Cardiovascular:      " Rate and Rhythm: Normal rate and regular rhythm.      Heart sounds: No murmur heard.    No friction rub. No gallop.   Pulmonary:      Effort: Pulmonary effort is normal. No respiratory distress.      Breath sounds: Wheezing present. No rhonchi or rales.   Abdominal:      General: Bowel sounds are normal.      Palpations: There is no mass.      Tenderness: There is no abdominal tenderness. There is no guarding.   Musculoskeletal:         General: No swelling.      Cervical back: No tenderness.      Right lower leg: No edema.      Left lower leg: No edema.   Lymphadenopathy:      Cervical: No cervical adenopathy.   Skin:     General: Skin is warm and dry.      Findings: No bruising, erythema or rash.   Neurological:      Mental Status: She is alert and oriented to person, place, and time.      Motor: No weakness.      Coordination: Coordination normal.      Gait: Gait normal.   Psychiatric:         Mood and Affect: Mood normal.         Behavior: Behavior normal.         Judgment: Judgment normal.         Current Outpatient Medications on File Prior to Visit   Medication Sig    acetaminophen (TYLENOL) 500 MG tablet Take 1,000 mg by mouth every 6 (six) hours as needed for Pain.    albuterol (PROVENTIL/VENTOLIN HFA) 90 mcg/actuation inhaler Inhale 2 puffs into the lungs every 6 (six) hours as needed for Wheezing. Rescue    amLODIPine (NORVASC) 5 MG tablet Take 1 tablet (5 mg total) by mouth once daily.    ascorbic acid, vitamin C, (VITAMIN C) 500 MG tablet Take 500 mg by mouth every morning.    atorvastatin (LIPITOR) 10 MG tablet Take 10 mg by mouth every morning.     cyclobenzaprine (FLEXERIL) 10 MG tablet Take 10 mg by mouth daily as needed.     docusate sodium (COLACE) 100 MG capsule Take 100 mg by mouth 2 (two) times daily as needed for Constipation.    doxycycline (MONODOX) 100 MG capsule Take 1 capsule (100 mg total) by mouth 2 (two) times daily.    LIDOcaine-prilocaine (EMLA) cream Apply topically as  needed.    loratadine (CLARITIN) 10 mg tablet Take 1 tablet by mouth once daily.    losartan (COZAAR) 100 MG tablet Take 100 mg by mouth every morning.     megestroL (MEGACE) 400 mg/10 mL (40 mg/mL) Susp Take 200 mg by mouth once daily.    multivitamin capsule Take 1 capsule by mouth once daily.    ondansetron (ZOFRAN-ODT) 4 MG TbDL 4 mg every 6 (six) hours.    pantoprazole (PROTONIX) 40 MG tablet Take 1 tablet (40 mg total) by mouth every morning.    paroxetine (PAXIL) 20 MG tablet Take 1 tablet by mouth every morning.    potassium chloride SA (K-DUR,KLOR-CON) 20 MEQ tablet Take 1 tablet (20 mEq total) by mouth once daily.    triamcinolone acetonide 0.025% (KENALOG) 0.025 % cream Apply topically 2 (two) times daily.    triamcinolone acetonide 0.025% (KENALOG) 0.025 % Oint Apply topically 2 (two) times daily. Apply to affected area twice daily    water Liqd 150 mL with MILK OF MAGNESIA 400 mg/5 mL Susp 400 mg, diphenhydrAMINE 12.5 mg/5 mL Elix 60 mg, nystatin 100,000 unit/mL Susp 500,000 Units SWISH & SWALLOW 5 ML'S EVERY 4 HOURS AS NEEDED FOR MOUTH & THROAT PAIN    [DISCONTINUED] HYDROcodone-acetaminophen (NORCO) 5-325 mg per tablet Take 1 tablet by mouth every 6 (six) hours as needed for Pain.    ALPRAZolam (XANAX) 0.25 MG tablet Take 1 tablet (0.25 mg total) by mouth 3 (three) times daily as needed for Anxiety.    folic acid (FOLVITE) 1 MG tablet Take 1 tablet (1 mg total) by mouth once daily.    gabapentin (NEURONTIN) 300 MG capsule Take 1 capsule (300 mg total) by mouth 2 (two) times daily.    venlafaxine (EFFEXOR XR) 75 MG 24 hr capsule Take 2 capsules (150 mg total) by mouth once daily.     Current Facility-Administered Medications on File Prior to Visit   Medication    ceFAZolin injection 2 g    [COMPLETED] dexAMETHasone tablet 4 mg    heparin, porcine (PF) 100 unit/mL injection flush 500 Units    sodium chloride 0.9% 100 mL flush bag    sodium chloride 0.9% flush 10 mL    vinorelbine  (NAVELBINE) 40 mg in sodium chloride 0.9% 50 mL chemo infusion       CBC:  Lab Results   Component Value Date    WBC 5.17 04/26/2022    HGB 11.2 (L) 04/26/2022    HCT 34.1 (L) 04/26/2022    MCV 97 04/26/2022     04/26/2022         CMP:  Sodium   Date Value Ref Range Status   04/26/2022 136 136 - 145 mmol/L Final     Potassium   Date Value Ref Range Status   04/26/2022 4.6 3.5 - 5.1 mmol/L Final     Chloride   Date Value Ref Range Status   04/26/2022 99 95 - 110 mmol/L Final     CO2   Date Value Ref Range Status   04/26/2022 25 23 - 29 mmol/L Final     Glucose   Date Value Ref Range Status   04/26/2022 159 (H) 70 - 110 mg/dL Final     BUN   Date Value Ref Range Status   04/26/2022 20 8 - 23 mg/dL Final     Creatinine   Date Value Ref Range Status   04/26/2022 1.2 0.5 - 1.4 mg/dL Final     Calcium   Date Value Ref Range Status   04/26/2022 9.7 8.7 - 10.5 mg/dL Final     Total Protein   Date Value Ref Range Status   04/11/2022 7.9 6.0 - 8.4 g/dL Final     Albumin   Date Value Ref Range Status   04/11/2022 3.5 3.5 - 5.2 g/dL Final     Total Bilirubin   Date Value Ref Range Status   04/11/2022 0.3 0.1 - 1.0 mg/dL Final     Comment:     For infants and newborns, interpretation of results should be based  on gestational age, weight and in agreement with clinical  observations.    Premature Infant recommended reference ranges:  Up to 24 hours.............<8.0 mg/dL  Up to 48 hours............<12.0 mg/dL  3-5 days..................<15.0 mg/dL  6-29 days.................<15.0 mg/dL       Alkaline Phosphatase   Date Value Ref Range Status   04/11/2022 178 (H) 55 - 135 U/L Final     AST   Date Value Ref Range Status   04/11/2022 19 10 - 40 U/L Final     ALT   Date Value Ref Range Status   04/11/2022 20 10 - 44 U/L Final     Anion Gap   Date Value Ref Range Status   04/26/2022 12 8 - 16 mmol/L Final     eGFR if    Date Value Ref Range Status   04/26/2022 51 (A) >60 mL/min/1.73 m^2 Final     eGFR if non     Date Value Ref Range Status   04/26/2022 44 (A) >60 mL/min/1.73 m^2 Final     Comment:     Calculation used to obtain the estimated glomerular filtration  rate (eGFR) is the CKD-EPI equation.          Assessment:       1. Malignant mesothelioma of pleura    2. Pain of upper abdomen    3. Cancer related pain         Plan:   Malignant mesothelioma of pleura  -Proceed with Navelbine C2 D15 today as planned   -Follow up on 5/9 with Dr. Mcdaniel and labs, C2 Navelbine on 5/10     Anemia   -Chemotherapy induced anemia  -Last received Retacrit 09/2021  -Will continue to hold Retacrit in light of her recent TIA, counts have been stable  -Hbg today 11.2 g/dL;will continue to monitor    Cancer related pain  -Has continued, nagging pain in upper abd  -Is taking Protonix 40 mg po daily   -Will give Rx today for hydrocodone/APAP 5/325 mg tabs #30  -Add CMP to next visit to evaluate liver function      Patient queried and all questions were answered.      Route Chart for Scheduling    Med Onc Chart Routing      Follow up with physician 2 weeks. With Dr. Mcdaniel on 4/11 as planned, C2D1 Navelbine on 4/12   Follow up with [facility]    Labs    Lab interval:  CBC, CMP, LDH, Mg prior to 4/11 appointment    Imaging    Pharmacy appointment    Other referrals          Treatment Plan Information   OP VINORELBINE Q4W   Presley Mcdaniel MD   Upcoming Treatment Dates - OP VINORELBINE Q4W    5/10/2022       Pre-Medications       dexAMETHasone tablet 4 mg       Chemotherapy       vinorelbine (NAVELBINE) 40 mg in sodium chloride 0.9% 50 mL chemo infusion  5/17/2022       Pre-Medications       dexAMETHasone tablet 4 mg       Chemotherapy       vinorelbine (NAVELBINE) 40 mg in sodium chloride 0.9% 50 mL chemo infusion  5/24/2022       Pre-Medications       dexAMETHasone tablet 4 mg       Chemotherapy       vinorelbine (NAVELBINE) 40 mg in sodium chloride 0.9% 50 mL chemo infusion  6/7/2022       Pre-Medications       dexAMETHasone  tablet 4 mg       Chemotherapy       vinorelbine (NAVELBINE) 40 mg in sodium chloride 0.9% 50 mL chemo infusion    Supportive Plan Information  IV FLUIDS AND ELECTROLYTES   Presley Mcdaniel MD   Upcoming Treatment Dates - IV FLUIDS AND ELECTROLYTES    No upcoming days in selected categories.      Route Chart for Scheduling    Med Onc Chart Routing      Follow up with physician Other. Keep f/u on 5/9 as previous   Follow up with [facility]    Labs    Lab interval:  Add CMP to labs on 5/9 in place of [facility]   Imaging    Pharmacy appointment    Other referrals          Treatment Plan Information   OP VINORELBINE Q4W   Presley Mcdaniel MD   Upcoming Treatment Dates - OP VINORELBINE Q4W    5/10/2022       Pre-Medications       dexAMETHasone tablet 4 mg       Chemotherapy       vinorelbine (NAVELBINE) 40 mg in sodium chloride 0.9% 50 mL chemo infusion  5/17/2022       Pre-Medications       dexAMETHasone tablet 4 mg       Chemotherapy       vinorelbine (NAVELBINE) 40 mg in sodium chloride 0.9% 50 mL chemo infusion  5/24/2022       Pre-Medications       dexAMETHasone tablet 4 mg       Chemotherapy       vinorelbine (NAVELBINE) 40 mg in sodium chloride 0.9% 50 mL chemo infusion  6/7/2022       Pre-Medications       dexAMETHasone tablet 4 mg       Chemotherapy       vinorelbine (NAVELBINE) 40 mg in sodium chloride 0.9% 50 mL chemo infusion    Supportive Plan Information  IV FLUIDS AND ELECTROLYTES   Presley Mcdaniel MD   Upcoming Treatment Dates - IV FLUIDS AND ELECTROLYTES    No upcoming days in selected categories.

## 2022-04-29 ENCOUNTER — TELEPHONE (OUTPATIENT)
Dept: HEMATOLOGY/ONCOLOGY | Facility: CLINIC | Age: 77
End: 2022-04-29
Payer: MEDICARE

## 2022-04-29 NOTE — TELEPHONE ENCOUNTER
I spoke with Yoanna to see if she was still interested in getting an IO appt scheduled since last time we spoke she wanted to start tx first then decide. She said she is doing fine now and doesn't think she needs us. I advised to call fi she changes her mind.

## 2022-05-09 ENCOUNTER — OFFICE VISIT (OUTPATIENT)
Dept: HEMATOLOGY/ONCOLOGY | Facility: CLINIC | Age: 77
End: 2022-05-09
Payer: MEDICARE

## 2022-05-09 ENCOUNTER — LAB VISIT (OUTPATIENT)
Dept: LAB | Facility: HOSPITAL | Age: 77
End: 2022-05-09
Attending: INTERNAL MEDICINE
Payer: MEDICARE

## 2022-05-09 VITALS
OXYGEN SATURATION: 97 % | TEMPERATURE: 99 F | DIASTOLIC BLOOD PRESSURE: 76 MMHG | WEIGHT: 119.63 LBS | SYSTOLIC BLOOD PRESSURE: 155 MMHG | HEART RATE: 99 BPM | RESPIRATION RATE: 16 BRPM | HEIGHT: 65 IN | BODY MASS INDEX: 19.93 KG/M2

## 2022-05-09 DIAGNOSIS — G45.9 TRANSIENT CEREBRAL ISCHEMIA, UNSPECIFIED TYPE: ICD-10-CM

## 2022-05-09 DIAGNOSIS — D64.81 ANTINEOPLASTIC CHEMOTHERAPY INDUCED ANEMIA: ICD-10-CM

## 2022-05-09 DIAGNOSIS — G89.3 CANCER RELATED PAIN: ICD-10-CM

## 2022-05-09 DIAGNOSIS — C45.0 MALIGNANT MESOTHELIOMA OF PLEURA: Primary | ICD-10-CM

## 2022-05-09 DIAGNOSIS — H91.90 HEARING DIFFICULTY, UNSPECIFIED LATERALITY: Primary | ICD-10-CM

## 2022-05-09 DIAGNOSIS — F32.0 CURRENT MILD EPISODE OF MAJOR DEPRESSIVE DISORDER WITHOUT PRIOR EPISODE: ICD-10-CM

## 2022-05-09 DIAGNOSIS — C45.0 MALIGNANT MESOTHELIOMA OF PLEURA: ICD-10-CM

## 2022-05-09 DIAGNOSIS — T45.1X5A ANTINEOPLASTIC CHEMOTHERAPY INDUCED ANEMIA: ICD-10-CM

## 2022-05-09 DIAGNOSIS — N18.30 STAGE 3 CHRONIC KIDNEY DISEASE, UNSPECIFIED WHETHER STAGE 3A OR 3B CKD: ICD-10-CM

## 2022-05-09 LAB
ALBUMIN SERPL BCP-MCNC: 3.3 G/DL (ref 3.5–5.2)
ALP SERPL-CCNC: 165 U/L (ref 55–135)
ALT SERPL W/O P-5'-P-CCNC: 22 U/L (ref 10–44)
ANION GAP SERPL CALC-SCNC: 10 MMOL/L (ref 8–16)
AST SERPL-CCNC: 19 U/L (ref 10–40)
BASOPHILS # BLD AUTO: 0.09 K/UL (ref 0–0.2)
BASOPHILS NFR BLD: 1.4 % (ref 0–1.9)
BILIRUB SERPL-MCNC: 0.3 MG/DL (ref 0.1–1)
BUN SERPL-MCNC: 22 MG/DL (ref 8–23)
CALCIUM SERPL-MCNC: 10 MG/DL (ref 8.7–10.5)
CHLORIDE SERPL-SCNC: 100 MMOL/L (ref 95–110)
CO2 SERPL-SCNC: 29 MMOL/L (ref 23–29)
CREAT SERPL-MCNC: 1.3 MG/DL (ref 0.5–1.4)
DIFFERENTIAL METHOD: ABNORMAL
EOSINOPHIL # BLD AUTO: 0.1 K/UL (ref 0–0.5)
EOSINOPHIL NFR BLD: 2 % (ref 0–8)
ERYTHROCYTE [DISTWIDTH] IN BLOOD BY AUTOMATED COUNT: 14.6 % (ref 11.5–14.5)
EST. GFR  (AFRICAN AMERICAN): 46 ML/MIN/1.73 M^2
EST. GFR  (NON AFRICAN AMERICAN): 40 ML/MIN/1.73 M^2
GLUCOSE SERPL-MCNC: 133 MG/DL (ref 70–110)
HCT VFR BLD AUTO: 35.4 % (ref 37–48.5)
HGB BLD-MCNC: 11.6 G/DL (ref 12–16)
IMM GRANULOCYTES # BLD AUTO: 0.02 K/UL (ref 0–0.04)
IMM GRANULOCYTES NFR BLD AUTO: 0.3 % (ref 0–0.5)
LDH SERPL L TO P-CCNC: 203 U/L (ref 110–260)
LYMPHOCYTES # BLD AUTO: 1.5 K/UL (ref 1–4.8)
LYMPHOCYTES NFR BLD: 23.7 % (ref 18–48)
MAGNESIUM SERPL-MCNC: 1.9 MG/DL (ref 1.6–2.6)
MCH RBC QN AUTO: 31 PG (ref 27–31)
MCHC RBC AUTO-ENTMCNC: 32.8 G/DL (ref 32–36)
MCV RBC AUTO: 95 FL (ref 82–98)
MONOCYTES # BLD AUTO: 1.5 K/UL (ref 0.3–1)
MONOCYTES NFR BLD: 22.6 % (ref 4–15)
NEUTROPHILS # BLD AUTO: 3.2 K/UL (ref 1.8–7.7)
NEUTROPHILS NFR BLD: 50 % (ref 38–73)
NRBC BLD-RTO: 0 /100 WBC
PLATELET # BLD AUTO: 411 K/UL (ref 150–450)
PMV BLD AUTO: 9.5 FL (ref 9.2–12.9)
POTASSIUM SERPL-SCNC: 4.6 MMOL/L (ref 3.5–5.1)
PROT SERPL-MCNC: 7.7 G/DL (ref 6–8.4)
RBC # BLD AUTO: 3.74 M/UL (ref 4–5.4)
SODIUM SERPL-SCNC: 139 MMOL/L (ref 136–145)
WBC # BLD AUTO: 6.42 K/UL (ref 3.9–12.7)

## 2022-05-09 PROCEDURE — 36415 COLL VENOUS BLD VENIPUNCTURE: CPT | Mod: PN | Performed by: INTERNAL MEDICINE

## 2022-05-09 PROCEDURE — 83615 LACTATE (LD) (LDH) ENZYME: CPT | Mod: PN | Performed by: INTERNAL MEDICINE

## 2022-05-09 PROCEDURE — 99214 OFFICE O/P EST MOD 30 MIN: CPT | Mod: S$GLB,,, | Performed by: INTERNAL MEDICINE

## 2022-05-09 PROCEDURE — 3077F SYST BP >= 140 MM HG: CPT | Mod: CPTII,S$GLB,, | Performed by: INTERNAL MEDICINE

## 2022-05-09 PROCEDURE — 99214 PR OFFICE/OUTPT VISIT, EST, LEVL IV, 30-39 MIN: ICD-10-PCS | Mod: S$GLB,,, | Performed by: INTERNAL MEDICINE

## 2022-05-09 PROCEDURE — 3078F PR MOST RECENT DIASTOLIC BLOOD PRESSURE < 80 MM HG: ICD-10-PCS | Mod: CPTII,S$GLB,, | Performed by: INTERNAL MEDICINE

## 2022-05-09 PROCEDURE — 85025 COMPLETE CBC W/AUTO DIFF WBC: CPT | Mod: PN | Performed by: INTERNAL MEDICINE

## 2022-05-09 PROCEDURE — 3288F PR FALLS RISK ASSESSMENT DOCUMENTED: ICD-10-PCS | Mod: CPTII,S$GLB,, | Performed by: INTERNAL MEDICINE

## 2022-05-09 PROCEDURE — 3288F FALL RISK ASSESSMENT DOCD: CPT | Mod: CPTII,S$GLB,, | Performed by: INTERNAL MEDICINE

## 2022-05-09 PROCEDURE — 1126F PR PAIN SEVERITY QUANTIFIED, NO PAIN PRESENT: ICD-10-PCS | Mod: CPTII,S$GLB,, | Performed by: INTERNAL MEDICINE

## 2022-05-09 PROCEDURE — 3078F DIAST BP <80 MM HG: CPT | Mod: CPTII,S$GLB,, | Performed by: INTERNAL MEDICINE

## 2022-05-09 PROCEDURE — 1101F PR PT FALLS ASSESS DOC 0-1 FALLS W/OUT INJ PAST YR: ICD-10-PCS | Mod: CPTII,S$GLB,, | Performed by: INTERNAL MEDICINE

## 2022-05-09 PROCEDURE — 80053 COMPREHEN METABOLIC PANEL: CPT | Mod: PN | Performed by: INTERNAL MEDICINE

## 2022-05-09 PROCEDURE — 3077F PR MOST RECENT SYSTOLIC BLOOD PRESSURE >= 140 MM HG: ICD-10-PCS | Mod: CPTII,S$GLB,, | Performed by: INTERNAL MEDICINE

## 2022-05-09 PROCEDURE — 1160F RVW MEDS BY RX/DR IN RCRD: CPT | Mod: CPTII,S$GLB,, | Performed by: INTERNAL MEDICINE

## 2022-05-09 PROCEDURE — 1159F MED LIST DOCD IN RCRD: CPT | Mod: CPTII,S$GLB,, | Performed by: INTERNAL MEDICINE

## 2022-05-09 PROCEDURE — 99999 PR PBB SHADOW E&M-EST. PATIENT-LVL V: CPT | Mod: PBBFAC,,, | Performed by: INTERNAL MEDICINE

## 2022-05-09 PROCEDURE — 83735 ASSAY OF MAGNESIUM: CPT | Mod: PN | Performed by: INTERNAL MEDICINE

## 2022-05-09 PROCEDURE — 1101F PT FALLS ASSESS-DOCD LE1/YR: CPT | Mod: CPTII,S$GLB,, | Performed by: INTERNAL MEDICINE

## 2022-05-09 PROCEDURE — 1160F PR REVIEW ALL MEDS BY PRESCRIBER/CLIN PHARMACIST DOCUMENTED: ICD-10-PCS | Mod: CPTII,S$GLB,, | Performed by: INTERNAL MEDICINE

## 2022-05-09 PROCEDURE — 1126F AMNT PAIN NOTED NONE PRSNT: CPT | Mod: CPTII,S$GLB,, | Performed by: INTERNAL MEDICINE

## 2022-05-09 PROCEDURE — 1159F PR MEDICATION LIST DOCUMENTED IN MEDICAL RECORD: ICD-10-PCS | Mod: CPTII,S$GLB,, | Performed by: INTERNAL MEDICINE

## 2022-05-09 PROCEDURE — 99999 PR PBB SHADOW E&M-EST. PATIENT-LVL V: ICD-10-PCS | Mod: PBBFAC,,, | Performed by: INTERNAL MEDICINE

## 2022-05-09 RX ORDER — HEPARIN 100 UNIT/ML
500 SYRINGE INTRAVENOUS
Status: CANCELLED | OUTPATIENT
Start: 2022-05-10

## 2022-05-09 RX ORDER — SODIUM CHLORIDE 0.9 % (FLUSH) 0.9 %
10 SYRINGE (ML) INJECTION
Status: CANCELLED | OUTPATIENT
Start: 2022-05-10

## 2022-05-09 RX ORDER — DEXAMETHASONE 4 MG/1
4 TABLET ORAL
Status: CANCELLED | OUTPATIENT
Start: 2022-05-10

## 2022-05-09 NOTE — PROGRESS NOTES
History of present illness:  The patient is a 76-year-old white female recently discovered to have pleural mesothelioma.  Patient began experiencing difficulties with shortness of breath last fall.  She was eventually discovered to have pleural effusion and had a drain.  However recurred.  Patient eventually underwent pleural biopsy which returned positive for malignant mesothelioma.  She also had PleurX catheter placed to assist with periodic drainage of pleural fluid.  She drains every 2 days and obtains anywhere from 150-200 cc per drain.  Patient has some difficulties with thoracic discomfort and has hydrocodone 7.5-325 on hand.  She takes this only at night.  She denies weight loss, productive cough, hemoptysis, or worsening dyspnea.  She has been seen and evaluated by Dr. Novak and is scheduled to undergo staging laparoscopy to determine whether not she has spread of disease across the diaphragm into the abdomen.  Consult has been requested for assistance with use of neoadjuvant or palliative chemotherapy.     Since initial office consultation, patient is undergone exploratory laparotomy.  She was found not to have any diaphragmatic involvement or intra-abdominal disease in association with her diagnosis of malignant mesothelioma.  She returns to clinic to review interval studies in preparation for neoadjuvant chemotherapy.  MediPort catheter has been placed within the patient's right chest.     Patient returns to clinic for re-evaluation following 4 cycles of cisplatin/Alimta. Review of imaging and discussions in tumor board following completion of neoadjuvant therapy were for maintenance Alimta as opposed to surgical intervention.  However, patient's performance status was too poor to allow for such and she was observed until performance status would allow for therapy.  She has completed 1 cycle of maintenance Alimta and returns to clinic for toxicity assessment part way through her cycle.  Patient  reports increasing fatigue and disequilibrium.  She also has intermittent, evening, postprandial nausea emesis.  She is experiencing worsening dyspnea on exertion.  Pleural catheter drainage is nil.    Patient returns to clinic to review interval lab prior to initiation of cycle 4 of Keytruda.  Patient, unfortunately, failed therapy with Keytruda and has been transitioned to single agent Gemzar.  She returns to clinic for evaluation prior to cycle 4, day 8 of therapy.  Patient has had no additional symptoms of TIA.  Patient has yet to be evaluated by Neurology.  Appointment has been scheduled for 04/19/2022.    Patient has failed therapy with Gemzar and is now receiving single agent Navelbine.  She returns to clinic for cycle 3, day 1 of therapy.  Patient had some increasing pain in the right hemithorax over the last 2 weeks.  She took hydrocodone for this and was extremely sleepy on the medication.  Alternatively, she has begun to use nonsteroidal anti-inflammatories with good relief.      Physical examination:  Well-developed, well-nourished, elderly, white female, no acute distress, who has a weight of 119.5 lb (decreased by 2 lb).  VITAL SIGNS: Documented  and reviewed this visit.  HEENT: Normocephalic, atraumatic. Oral mucosa pink and moist. Lips without lesions. Tongue midline. Oropharynx clear. Nonicteric sclerae.  Tympanic membranes are clear.  NECK: Supple, no adenopathy. No carotid bruits, thyromegaly or thyroid nodule.   HEART: Regular rate and rhythm without murmur, gallop or rub.   LUNGS:  Absent breath sounds over the right hemithorax.  Otherwise, clear to auscultation with normal respiratory effort.    ABDOMEN: Soft, nontender, nondistended with positive normoactive bowel sounds, no hepatosplenomegaly.   EXTREMITIES: No cyanosis, clubbing or edema. Distal pulses are intact.   AXILLAE AND GROIN: No palpable pathologic lymphadenopathy is appreciated.   SKIN: Intact/turgor normal.  MediPort catheter  present within the right chest.  Surgical wound is well approximated and healed.  It is free from signs of infection.    NEUROLOGIC: Cranial nerves II-XII grossly intact. Motor: Good muscle bulk and tone. Strength/sensory 5/5 throughout. Gait unstable/ambulating with the assistance of a cane.     Laboratory:  White count 6.4, hemoglobin 11.6, hematocrit 35.4, platelets 411, absolute neutrophil count is 3200.  Sodium 139, potassium 4.6, chloride 100, CO2 29, BUN 22, creatinine 1.3, glucose 133, calcium 10, magnesium 1.9, alkaline phosphatase 165, albumin 3.3, the remainder liver function test within normal limits, LDH is pending at the time of dictation, GFR is 40.    Guardant 360 panel:  Remarkable for TP53 mutation and an EGFR mutation of uncertain clinical significance.  Tumor was not MSI high.    Impression:  1. Malignant pleural mesothelioma with disease progression on Gemzar.    2. Therapy associated fatigue malaise.  3. Depression/anxiety around issues of health.    4. Esophagitis - S/P dilatation and biopsy.  5. Mild chemotherapy associated anemia not in need intervention.  6. Stage 3 chronic kidney disease.  7. Transient ischemic attack.    Plan:  1. Proceed with cycle 3, day 1 dose of Navelbine.  2. Patient is to return to clinic in 1 week with CBC.    Advance Care Planning During this visit, I engaged the patient in the advance care planning process.  The patient and I reviewed the role for advance directives and their purpose in directing future healthcare if the patients unable to speak for him/herself.  At this point in time, the patient does have full decision-making capacity.  We discussed different extreme health states that they could experience, and reviewed what kind of medical care the patient would want in those situations.  The patient communicated that if they were comatose and had little chance of a meaningful recovery, they would not want machines/life-sustaining treatments used.  In  addition to the above preference, other important end-of-life issues for the patient include DNR.  The patient has  completed a living will to reflect these preferences.  The patient has already designated a healthcare power of  (her ) to make decisions on their behalf.    30 minutes time spent.  This note was created using voice recognition software and may contain grammatical errors.

## 2022-05-10 ENCOUNTER — INFUSION (OUTPATIENT)
Dept: INFUSION THERAPY | Facility: HOSPITAL | Age: 77
End: 2022-05-10
Attending: INTERNAL MEDICINE
Payer: MEDICARE

## 2022-05-10 VITALS
HEART RATE: 96 BPM | WEIGHT: 121.88 LBS | SYSTOLIC BLOOD PRESSURE: 139 MMHG | TEMPERATURE: 98 F | DIASTOLIC BLOOD PRESSURE: 59 MMHG | RESPIRATION RATE: 16 BRPM | BODY MASS INDEX: 20.28 KG/M2

## 2022-05-10 DIAGNOSIS — C45.0 MALIGNANT MESOTHELIOMA OF PLEURA: Primary | ICD-10-CM

## 2022-05-10 PROCEDURE — A4216 STERILE WATER/SALINE, 10 ML: HCPCS | Mod: PN | Performed by: INTERNAL MEDICINE

## 2022-05-10 PROCEDURE — 63600175 PHARM REV CODE 636 W HCPCS: Mod: PN | Performed by: INTERNAL MEDICINE

## 2022-05-10 PROCEDURE — 96409 CHEMO IV PUSH SNGL DRUG: CPT | Mod: PN

## 2022-05-10 PROCEDURE — 25000003 PHARM REV CODE 250: Mod: PN | Performed by: INTERNAL MEDICINE

## 2022-05-10 RX ORDER — DEXAMETHASONE 4 MG/1
4 TABLET ORAL
Status: COMPLETED | OUTPATIENT
Start: 2022-05-10 | End: 2022-05-10

## 2022-05-10 RX ORDER — HEPARIN 100 UNIT/ML
500 SYRINGE INTRAVENOUS
Status: DISCONTINUED | OUTPATIENT
Start: 2022-05-10 | End: 2022-05-10 | Stop reason: HOSPADM

## 2022-05-10 RX ORDER — SODIUM CHLORIDE 0.9 % (FLUSH) 0.9 %
10 SYRINGE (ML) INJECTION
Status: DISCONTINUED | OUTPATIENT
Start: 2022-05-10 | End: 2022-05-10 | Stop reason: HOSPADM

## 2022-05-10 RX ADMIN — DEXAMETHASONE 4 MG: 4 TABLET ORAL at 02:05

## 2022-05-10 RX ADMIN — VINORELBINE 40 MG: 10 INJECTION, SOLUTION INTRAVENOUS at 03:05

## 2022-05-10 RX ADMIN — Medication 10 ML: at 03:05

## 2022-05-10 RX ADMIN — Medication 500 UNITS: at 03:05

## 2022-05-10 RX ADMIN — SODIUM CHLORIDE: 0.9 INJECTION, SOLUTION INTRAVENOUS at 02:05

## 2022-05-17 ENCOUNTER — OFFICE VISIT (OUTPATIENT)
Dept: HEMATOLOGY/ONCOLOGY | Facility: CLINIC | Age: 77
End: 2022-05-17
Payer: MEDICARE

## 2022-05-17 ENCOUNTER — INFUSION (OUTPATIENT)
Dept: INFUSION THERAPY | Facility: HOSPITAL | Age: 77
End: 2022-05-17
Attending: INTERNAL MEDICINE
Payer: MEDICARE

## 2022-05-17 ENCOUNTER — DOCUMENTATION ONLY (OUTPATIENT)
Dept: INFUSION THERAPY | Facility: HOSPITAL | Age: 77
End: 2022-05-17
Payer: MEDICARE

## 2022-05-17 VITALS
RESPIRATION RATE: 17 BRPM | BODY MASS INDEX: 20.71 KG/M2 | SYSTOLIC BLOOD PRESSURE: 148 MMHG | OXYGEN SATURATION: 95 % | HEIGHT: 65 IN | DIASTOLIC BLOOD PRESSURE: 82 MMHG | HEART RATE: 95 BPM | WEIGHT: 124.31 LBS | TEMPERATURE: 97 F

## 2022-05-17 VITALS
TEMPERATURE: 97 F | BODY MASS INDEX: 20.71 KG/M2 | RESPIRATION RATE: 18 BRPM | HEIGHT: 65 IN | DIASTOLIC BLOOD PRESSURE: 83 MMHG | WEIGHT: 124.31 LBS | OXYGEN SATURATION: 95 % | SYSTOLIC BLOOD PRESSURE: 144 MMHG | HEART RATE: 102 BPM

## 2022-05-17 DIAGNOSIS — C45.0 MALIGNANT MESOTHELIOMA OF PLEURA: Primary | ICD-10-CM

## 2022-05-17 DIAGNOSIS — T45.1X5A ANTINEOPLASTIC CHEMOTHERAPY INDUCED ANEMIA: ICD-10-CM

## 2022-05-17 DIAGNOSIS — G89.3 CANCER RELATED PAIN: ICD-10-CM

## 2022-05-17 DIAGNOSIS — D64.81 ANTINEOPLASTIC CHEMOTHERAPY INDUCED ANEMIA: ICD-10-CM

## 2022-05-17 PROCEDURE — 3079F PR MOST RECENT DIASTOLIC BLOOD PRESSURE 80-89 MM HG: ICD-10-PCS | Mod: CPTII,S$GLB,,

## 2022-05-17 PROCEDURE — 96409 CHEMO IV PUSH SNGL DRUG: CPT | Mod: PN

## 2022-05-17 PROCEDURE — 99214 PR OFFICE/OUTPT VISIT, EST, LEVL IV, 30-39 MIN: ICD-10-PCS | Mod: S$GLB,,,

## 2022-05-17 PROCEDURE — 99214 OFFICE O/P EST MOD 30 MIN: CPT | Mod: S$GLB,,,

## 2022-05-17 PROCEDURE — 1101F PR PT FALLS ASSESS DOC 0-1 FALLS W/OUT INJ PAST YR: ICD-10-PCS | Mod: CPTII,S$GLB,,

## 2022-05-17 PROCEDURE — 99999 PR PBB SHADOW E&M-EST. PATIENT-LVL IV: ICD-10-PCS | Mod: PBBFAC,,,

## 2022-05-17 PROCEDURE — 3077F SYST BP >= 140 MM HG: CPT | Mod: CPTII,S$GLB,,

## 2022-05-17 PROCEDURE — 25000003 PHARM REV CODE 250: Mod: PN

## 2022-05-17 PROCEDURE — 3288F FALL RISK ASSESSMENT DOCD: CPT | Mod: CPTII,S$GLB,,

## 2022-05-17 PROCEDURE — 63600175 PHARM REV CODE 636 W HCPCS: Mod: PN

## 2022-05-17 PROCEDURE — 1126F PR PAIN SEVERITY QUANTIFIED, NO PAIN PRESENT: ICD-10-PCS | Mod: CPTII,S$GLB,,

## 2022-05-17 PROCEDURE — 1126F AMNT PAIN NOTED NONE PRSNT: CPT | Mod: CPTII,S$GLB,,

## 2022-05-17 PROCEDURE — 99999 PR PBB SHADOW E&M-EST. PATIENT-LVL IV: CPT | Mod: PBBFAC,,,

## 2022-05-17 PROCEDURE — 3077F PR MOST RECENT SYSTOLIC BLOOD PRESSURE >= 140 MM HG: ICD-10-PCS | Mod: CPTII,S$GLB,,

## 2022-05-17 PROCEDURE — 3288F PR FALLS RISK ASSESSMENT DOCUMENTED: ICD-10-PCS | Mod: CPTII,S$GLB,,

## 2022-05-17 PROCEDURE — 3079F DIAST BP 80-89 MM HG: CPT | Mod: CPTII,S$GLB,,

## 2022-05-17 PROCEDURE — 1101F PT FALLS ASSESS-DOCD LE1/YR: CPT | Mod: CPTII,S$GLB,,

## 2022-05-17 RX ORDER — SODIUM CHLORIDE 0.9 % (FLUSH) 0.9 %
10 SYRINGE (ML) INJECTION
Status: DISCONTINUED | OUTPATIENT
Start: 2022-05-17 | End: 2022-05-17 | Stop reason: HOSPADM

## 2022-05-17 RX ORDER — DEXAMETHASONE 4 MG/1
4 TABLET ORAL
Status: CANCELLED | OUTPATIENT
Start: 2022-05-17

## 2022-05-17 RX ORDER — HEPARIN 100 UNIT/ML
500 SYRINGE INTRAVENOUS
Status: CANCELLED | OUTPATIENT
Start: 2022-05-17

## 2022-05-17 RX ORDER — HEPARIN 100 UNIT/ML
500 SYRINGE INTRAVENOUS
Status: DISCONTINUED | OUTPATIENT
Start: 2022-05-17 | End: 2022-05-17 | Stop reason: HOSPADM

## 2022-05-17 RX ORDER — SODIUM CHLORIDE 0.9 % (FLUSH) 0.9 %
10 SYRINGE (ML) INJECTION
Status: CANCELLED | OUTPATIENT
Start: 2022-05-17

## 2022-05-17 RX ORDER — DEXAMETHASONE 4 MG/1
4 TABLET ORAL
Status: COMPLETED | OUTPATIENT
Start: 2022-05-17 | End: 2022-05-17

## 2022-05-17 RX ORDER — GABAPENTIN 300 MG/1
300 CAPSULE ORAL NIGHTLY
Qty: 30 CAPSULE | Refills: 5 | Status: SHIPPED | OUTPATIENT
Start: 2022-05-17 | End: 2022-11-13

## 2022-05-17 RX ADMIN — VINORELBINE 40 MG: 10 INJECTION, SOLUTION INTRAVENOUS at 03:05

## 2022-05-17 RX ADMIN — DEXAMETHASONE 4 MG: 4 TABLET ORAL at 02:05

## 2022-05-17 RX ADMIN — SODIUM CHLORIDE: 0.9 INJECTION, SOLUTION INTRAVENOUS at 02:05

## 2022-05-17 NOTE — PLAN OF CARE
Problem: Adult Inpatient Plan of Care  Goal: Plan of Care Review  Outcome: Ongoing, Progressing  Flowsheets (Taken 5/17/2022 8736)  Plan of Care Reviewed With: patient     Patient tolerated Navelbine treatment well. Port flushed with blood return, saline locked, and de-accessed. AVS provided and reviewed. Ambulates per self; daughter accompanied patient upon discharge from infusion center.

## 2022-05-17 NOTE — PLAN OF CARE
Problem: Adult Inpatient Plan of Care  Goal: Patient-Specific Goal (Individualized)  Outcome: Ongoing, Progressing  Flowsheets (Taken 5/17/2022 1430)  Anxieties, Fears or Concerns: none expressed  Individualized Care Needs: recliner, blanket, pillow, ice  Patient-Specific Goals (Include Timeframe): no s/s rxn during txt     Problem: Fatigue  Goal: Improved Activity Tolerance  Intervention: Promote Improved Energy  Flowsheets (Taken 5/17/2022 1430)  Fatigue Management: frequent rest breaks encouraged  Sleep/Rest Enhancement:   natural light exposure provided   noise level reduced  Activity Management:   Ambulated -L4   Ambulated in alcantar - L4

## 2022-05-17 NOTE — PROGRESS NOTES
Subjective:     Name: Yoanna Thapa  : 1945  MRN: [id number]    Chief Complaint: Follow-up, mesothelioma    HPI:   Yoanna Thapa is a 76 y.o. female known to Dr. Mcdaniel who presents for evaluation of pleural mesothelioma and consideration of C3 D15 Navelbine . She is accompanied by her daughter.    Patient has occasional bloating and abdominal discomfort, she takes PRN laxatives and Gas-x; these seem to help symptoms. RUQ pain has improved, she now has some discomfort in the LUQ. Plans to restart Gabapentin 300 mg po HS to see if this will help with her discomfort as Dr. Mcdaniel suggested this could be nerve pain. Chronic cough has subsided.  No weakness, facial drooping, problems with coordination. Appetite has been good; weight stable at 124 lbs. Denies fevers, chills, rigors. Denies CP, palpitations, SOB, changes in bowel habits, urinary discomfort or frequency changes. No new lumps, bumps or swelling. Denies bruising or bleeding.     Prior History:   In the  of  she began experiencing difficulties with breathing and SOB. Was found to have pleural effusion. 2021 effusion recurred and she underwent a pleural biopsy which revealed malignant mesothelioma.   2021 Pleurx catheter was placed  2021 Staging laparotomy done. No diaphragmatic involvement or intra-abdominal disease in association with her diagnosis of malignant mesothelioma.   03/10/2021 to 2021- Neoadjuvant Cisplatin/Alimta .Performance status too poor to allow for surgical intervention.  Had once cycle of maintenance Alimta; tolerated poorly.  21 to 11/10/21- Keytruda; PET on 21 showed progressive disease  21 to 2022- Single agent Gemzar.   PET/CT on 3/07/2022 shows progression of disease.  3/15/2022- Began Navelbine on D1, 8, 15 of a 28 day cycle     Past Medical History:   Diagnosis Date    Anxiety     Breast cancer     Right: Lumpectomy and XRT    Cancer 2009    breast     Cancer of pleura, secondary 1/27/2021    Depression     Disorder of kidney and ureter     GERD (gastroesophageal reflux disease)     Hyperlipidemia     Hypertension     Malignant pleural effusion 1/27/2021    Symptomatic anemia 6/15/2021       Past Surgical History:   Procedure Laterality Date    APPENDECTOMY      BACK SURGERY      BREAST LUMPECTOMY Right     COSMETIC SURGERY      DIAGNOSTIC LAPAROSCOPY N/A 2/18/2021    Procedure: LAPAROSCOPY, DIAGNOSTIC;  Surgeon: Aravind Novak MD;  Location: [facility] OR 2ND FLR;  Service: Colon and Rectal;  Laterality: N/A;    ESOPHAGEAL DILATION N/A 5/20/2021    Procedure: DILATION, ESOPHAGUS;  Surgeon: HO Machuca MD;  Location: [facility] ENDO;  Service: Endoscopy;  Laterality: N/A;    ESOPHAGOGASTRODUODENOSCOPY N/A 5/20/2021    Procedure: EGD (ESOPHAGOGASTRODUODENOSCOPY);  Surgeon: HO Machuca MD;  Location: [facility] ENDO;  Service: Endoscopy;  Laterality: N/A;    HYSTERECTOMY      INSERTION OF SUBCUTANEOUS PORT      INSERTION OF TUNNELED CENTRAL VENOUS CATHETER (CVC) WITH SUBCUTANEOUS PORT  2/18/2021    Procedure: INSERTION, PORT-A-CATH;  Surgeon: Aravind Novak MD;  Location: [facility] OR 2ND FLR;  Service: Colon and Rectal;;    SPINE SURGERY      THORACENTESIS Right 12/28/2020    [place]    THORACENTESIS Right 01/24/2021    [facility]    TUBE THORACOTOMY Right 2/1/2021    Procedure: INSERTION, CATHETER, INTERCOSTAL, FOR DRAINAGE;  Surgeon: Esdras Jarrett MD;  Location: [facility] OR;  Service: Cardiothoracic;  Laterality: Right;    VIDEO-ASSISTED THORACOSCOPIC SURGERY (VATS) Right 1/27/2021    Procedure: VATS (VIDEO-ASSISTED THORACOSCOPIC SURGERY) - Pleural biopsy ;  Surgeon: Esdras Jarrett MD;  Location: [facility] OR;  Service: Cardiovascular;  Laterality: Right;           Review of patient's allergies indicates:   Allergen Reactions    Iodine and iodide containing products Anaphylaxis and Swelling     Dye causes Blisters.  Pt tolerates  "shrimp and betadine.    Sulfa (sulfonamide antibiotics) Anaphylaxis and Hives    Adhesive Other (See Comments)     Blister    Blisters   Pt request paper tape.    Iodinated contrast media Blisters       Review of Systems   Constitutional: Negative for chills, decreased appetite, diaphoresis, fever, malaise/fatigue and night sweats.   HENT: Negative for congestion and sore throat.    Eyes: Negative for visual disturbance.   Cardiovascular: Negative for chest pain, dyspnea on exertion, leg swelling and palpitations.   Respiratory: Positive for wheezing (at night) and cough. Negative for shortness of breath.    Hematologic/Lymphatic: Negative for adenopathy and bleeding problem. Does not bruise/bleed easily.   Skin: Negative for dry skin, itching and rash.   Musculoskeletal: Negative for joint pain, joint swelling and myalgias.   Gastrointestinal: Negative for constipation, diarrhea, heartburn, hematochezia, nausea and vomiting.   Genitourinary: Negative for dysuria, frequency, hematuria and urgency.   Neurological: Negative for headaches, numbness, paresthesias and weakness.     Objective:     Vitals:    05/17/22 1301   BP: (!) 144/83   BP Location: Right arm   Patient Position: Sitting   BP Method: Medium (Automatic)   Pulse: 102   Resp: 18   Temp: 97.2 °F (36.2 °C)   TempSrc: Temporal   SpO2: 95%   Weight: 56.4 kg (124 lb 5.4 oz)   Height: 5' 5" (1.651 m)           Physical Exam  Vitals reviewed.   Constitutional:       General: She is not in acute distress.     Appearance: She is not diaphoretic.   HENT:      Head: Normocephalic and atraumatic.      Mouth/Throat:      Mouth: Mucous membranes are moist.      Pharynx: No oropharyngeal exudate.   Eyes:      General: No scleral icterus.  Cardiovascular:      Rate and Rhythm: Normal rate and regular rhythm.      Heart sounds: Normal heart sounds. No murmur heard.  Pulmonary:      Effort: Pulmonary effort is normal. No respiratory distress.      Breath sounds: Normal " breath sounds. No wheezing.   Abdominal:      General: Bowel sounds are normal. There is no distension.      Palpations: Abdomen is soft. There is no mass.      Tenderness: There is no abdominal tenderness.   Musculoskeletal:      Cervical back: No tenderness.      Right lower leg: No edema.   Lymphadenopathy:      Cervical: No cervical adenopathy.   Skin:     General: Skin is warm.      Coloration: Skin is not jaundiced.      Findings: No bruising or rash.   Neurological:      Mental Status: She is alert and oriented to person, place, and time.      Gait: Gait normal.   Psychiatric:         Mood and Affect: Mood normal.         Behavior: Behavior normal.         Judgment: Judgment normal.         Current Outpatient Medications on File Prior to Visit   Medication Sig    acetaminophen (TYLENOL) 500 MG tablet Take 1,000 mg by mouth every 6 (six) hours as needed for Pain.    albuterol (PROVENTIL/VENTOLIN HFA) 90 mcg/actuation inhaler Inhale 2 puffs into the lungs every 6 (six) hours as needed for Wheezing. Rescue    amLODIPine (NORVASC) 5 MG tablet Take 1 tablet (5 mg total) by mouth once daily.    ascorbic acid, vitamin C, (VITAMIN C) 500 MG tablet Take 500 mg by mouth every morning.    atorvastatin (LIPITOR) 10 MG tablet Take 10 mg by mouth every morning.     cyclobenzaprine (FLEXERIL) 10 MG tablet Take 10 mg by mouth daily as needed.     docusate sodium (COLACE) 100 MG capsule Take 100 mg by mouth 2 (two) times daily as needed for Constipation.    doxycycline (MONODOX) 100 MG capsule Take 1 capsule (100 mg total) by mouth 2 (two) times daily.    HYDROcodone-acetaminophen (NORCO) 5-325 mg per tablet Take 1 tablet by mouth every 6 (six) hours as needed for Pain.    LIDOcaine-prilocaine (EMLA) cream Apply topically as needed.    loratadine (CLARITIN) 10 mg tablet Take 1 tablet by mouth once daily.    losartan (COZAAR) 100 MG tablet Take 100 mg by mouth every morning.     megestroL (MEGACE) 400 mg/10 mL (40  mg/mL) Susp Take 200 mg by mouth once daily.    multivitamin capsule Take 1 capsule by mouth once daily.    ondansetron (ZOFRAN-ODT) 4 MG TbDL 4 mg every 6 (six) hours.    pantoprazole (PROTONIX) 40 MG tablet Take 1 tablet (40 mg total) by mouth every morning.    paroxetine (PAXIL) 20 MG tablet Take 1 tablet by mouth every morning.    potassium chloride SA (K-DUR,KLOR-CON) 20 MEQ tablet Take 1 tablet (20 mEq total) by mouth once daily.    triamcinolone acetonide 0.025% (KENALOG) 0.025 % cream Apply topically 2 (two) times daily.    triamcinolone acetonide 0.025% (KENALOG) 0.025 % Oint Apply topically 2 (two) times daily. Apply to affected area twice daily    water Liqd 150 mL with MILK OF MAGNESIA 400 mg/5 mL Susp 400 mg, diphenhydrAMINE 12.5 mg/5 mL Elix 60 mg, nystatin 100,000 unit/mL Susp 500,000 Units SWISH & SWALLOW 5 ML'S EVERY 4 HOURS AS NEEDED FOR MOUTH & THROAT PAIN    ALPRAZolam (XANAX) 0.25 MG tablet Take 1 tablet (0.25 mg total) by mouth 3 (three) times daily as needed for Anxiety.    folic acid (FOLVITE) 1 MG tablet Take 1 tablet (1 mg total) by mouth once daily.    gabapentin (NEURONTIN) 300 MG capsule Take 1 capsule (300 mg total) by mouth 2 (two) times daily.    venlafaxine (EFFEXOR XR) 75 MG 24 hr capsule Take 2 capsules (150 mg total) by mouth once daily.     Current Facility-Administered Medications on File Prior to Visit   Medication    ceFAZolin injection 2 g    dexAMETHasone tablet 4 mg    heparin, porcine (PF) 100 unit/mL injection flush 500 Units    sodium chloride 0.9% 100 mL flush bag    sodium chloride 0.9% flush 10 mL    vinorelbine (NAVELBINE) 40 mg in sodium chloride 0.9% 50 mL chemo infusion       CBC:  Lab Results   Component Value Date    WBC 9.83 05/17/2022    HGB 10.5 (L) 05/17/2022    HCT 31.8 (L) 05/17/2022    MCV 95 05/17/2022     05/17/2022         CMP:  Sodium   Date Value Ref Range Status   05/17/2022 138 136 - 145 mmol/L Final     Potassium   Date  Value Ref Range Status   05/17/2022 4.1 3.5 - 5.1 mmol/L Final     Chloride   Date Value Ref Range Status   05/17/2022 102 95 - 110 mmol/L Final     CO2   Date Value Ref Range Status   05/17/2022 27 23 - 29 mmol/L Final     Glucose   Date Value Ref Range Status   05/17/2022 128 (H) 70 - 110 mg/dL Final     BUN   Date Value Ref Range Status   05/17/2022 30 (H) 8 - 23 mg/dL Final     Creatinine   Date Value Ref Range Status   05/17/2022 1.2 0.5 - 1.4 mg/dL Final     Calcium   Date Value Ref Range Status   05/17/2022 9.3 8.7 - 10.5 mg/dL Final     Total Protein   Date Value Ref Range Status   05/17/2022 7.1 6.0 - 8.4 g/dL Final     Albumin   Date Value Ref Range Status   05/17/2022 3.1 (L) 3.5 - 5.2 g/dL Final     Total Bilirubin   Date Value Ref Range Status   05/17/2022 0.3 0.1 - 1.0 mg/dL Final     Comment:     For infants and newborns, interpretation of results should be based  on gestational age, weight and in agreement with clinical  observations.    Premature Infant recommended reference ranges:  Up to 24 hours.............<8.0 mg/dL  Up to 48 hours............<12.0 mg/dL  3-5 days..................<15.0 mg/dL  6-29 days.................<15.0 mg/dL       Alkaline Phosphatase   Date Value Ref Range Status   05/17/2022 157 (H) 55 - 135 U/L Final     AST   Date Value Ref Range Status   05/17/2022 19 10 - 40 U/L Final     ALT   Date Value Ref Range Status   05/17/2022 17 10 - 44 U/L Final     Anion Gap   Date Value Ref Range Status   05/17/2022 9 8 - 16 mmol/L Final     eGFR if    Date Value Ref Range Status   05/17/2022 51 (A) >60 mL/min/1.73 m^2 Final     eGFR if non    Date Value Ref Range Status   05/17/2022 44 (A) >60 mL/min/1.73 m^2 Final     Comment:     Calculation used to obtain the estimated glomerular filtration  rate (eGFR) is the CKD-EPI equation.          Assessment:       1. Malignant mesothelioma of pleura    2. Cancer related pain    3. Antineoplastic chemotherapy  induced anemia         Plan:   Malignant mesothelioma of pleura  -Proceed with Navelbine C3 D8 today as planned   -Follow up on 5/9 with Dr. Mcdaniel and labs on 5/23, C3 D15 Navelbine on 5/24    Anemia   -Chemotherapy induced anemia  -Last received Retacrit 09/2021  -Will continue to hold Retacrit in light of her recent TIA, counts have been stable  -Hbg today 10.5 g/dL;will continue to monitor    Cancer related pain  -Has continued, nagging pain in upper abd  -Is taking Protonix 40 mg po daily   -Will restart Gabapentin 300 mg HS      Patient queried and all questions were answered.  Assessment/Plan reviewed and approved by Dr. Mcdaniel    Route Chart for Scheduling    Med Onc Chart Routing      Follow up with physician 1 week. Proceed with C3D8 today; 5/23/22 with Dr. Mcdaniel paln for C3D15 5/24/22   Follow up with [facility]    Labs CBC   Lab interval:     Imaging    Pharmacy appointment No pharmacy appointment needed      Other referrals No additional referrals needed         Treatment Plan Information   OP VINORELBINE Q4W   Presley Mcdaniel MD   Upcoming Treatment Dates - OP VINORELBINE Q4W    5/24/2022       Pre-Medications       dexAMETHasone tablet 4 mg       Chemotherapy       vinorelbine (NAVELBINE) 40 mg in sodium chloride 0.9% 50 mL chemo infusion  6/7/2022       Pre-Medications       dexAMETHasone tablet 4 mg       Chemotherapy       vinorelbine (NAVELBINE) 40 mg in sodium chloride 0.9% 50 mL chemo infusion  6/14/2022       Pre-Medications       dexAMETHasone tablet 4 mg       Chemotherapy       vinorelbine (NAVELBINE) 40 mg in sodium chloride 0.9% 50 mL chemo infusion  6/21/2022       Pre-Medications       dexAMETHasone tablet 4 mg       Chemotherapy       vinorelbine (NAVELBINE) 40 mg in sodium chloride 0.9% 50 mL chemo infusion    Supportive Plan Information  IV FLUIDS AND ELECTROLYTES   Presley Mcdaniel MD   Upcoming Treatment Dates - IV FLUIDS AND ELECTROLYTES    No upcoming days in selected  categories.

## 2022-05-17 NOTE — PROGRESS NOTES
Oncology Nutrition   Chemotherapy Infusion Visit    Nutrition Follow Up   RD met with patient at chairside during infusion treatment. At previous RD visit, pt was complaining of poor appetite. Pt states her appetite has been great lately and she actually gained a few pounds. Denies any other nutrition side effects.     Wt Readings from Last 10 Encounters:   05/17/22 56.4 kg (124 lb 5.4 oz)   05/17/22 56.4 kg (124 lb 5.4 oz)   05/10/22 55.3 kg (121 lb 13.9 oz)   05/09/22 54.2 kg (119 lb 9.6 oz)   04/26/22 55.8 kg (123 lb 0.3 oz)   04/26/22 55.8 kg (123 lb 0.3 oz)   04/19/22 56.2 kg (123 lb 14.4 oz)   04/19/22 56.2 kg (123 lb 14.4 oz)   04/12/22 55.1 kg (121 lb 7.6 oz)   04/11/22 55.1 kg (121 lb 7.6 oz)       All other nutrition questions/concerns addressed as appropriate. Will continue to follow and monitor throughout treatment.     Sandra Lyons MS, RD, LDN  05/17/2022  3:22 PM

## 2022-05-23 ENCOUNTER — LAB VISIT (OUTPATIENT)
Dept: LAB | Facility: HOSPITAL | Age: 77
End: 2022-05-23
Attending: INTERNAL MEDICINE
Payer: MEDICARE

## 2022-05-23 ENCOUNTER — OFFICE VISIT (OUTPATIENT)
Dept: HEMATOLOGY/ONCOLOGY | Facility: CLINIC | Age: 77
End: 2022-05-23
Payer: MEDICARE

## 2022-05-23 VITALS
BODY MASS INDEX: 20.68 KG/M2 | TEMPERATURE: 98 F | DIASTOLIC BLOOD PRESSURE: 85 MMHG | HEIGHT: 65 IN | WEIGHT: 124.13 LBS | OXYGEN SATURATION: 99 % | RESPIRATION RATE: 16 BRPM | HEART RATE: 100 BPM | SYSTOLIC BLOOD PRESSURE: 170 MMHG

## 2022-05-23 DIAGNOSIS — D64.81 ANTINEOPLASTIC CHEMOTHERAPY INDUCED ANEMIA: ICD-10-CM

## 2022-05-23 DIAGNOSIS — T45.1X5A ANTINEOPLASTIC CHEMOTHERAPY INDUCED ANEMIA: ICD-10-CM

## 2022-05-23 DIAGNOSIS — C45.0 MALIGNANT MESOTHELIOMA OF PLEURA: ICD-10-CM

## 2022-05-23 DIAGNOSIS — N18.30 STAGE 3 CHRONIC KIDNEY DISEASE, UNSPECIFIED WHETHER STAGE 3A OR 3B CKD: ICD-10-CM

## 2022-05-23 DIAGNOSIS — F32.0 CURRENT MILD EPISODE OF MAJOR DEPRESSIVE DISORDER WITHOUT PRIOR EPISODE: ICD-10-CM

## 2022-05-23 DIAGNOSIS — C45.0 MALIGNANT MESOTHELIOMA OF PLEURA: Primary | ICD-10-CM

## 2022-05-23 DIAGNOSIS — G45.9 TRANSIENT CEREBRAL ISCHEMIA, UNSPECIFIED TYPE: ICD-10-CM

## 2022-05-23 DIAGNOSIS — G89.3 CANCER RELATED PAIN: ICD-10-CM

## 2022-05-23 LAB
BASOPHILS # BLD AUTO: 0.11 K/UL (ref 0–0.2)
BASOPHILS NFR BLD: 1.4 % (ref 0–1.9)
DIFFERENTIAL METHOD: ABNORMAL
EOSINOPHIL # BLD AUTO: 0.2 K/UL (ref 0–0.5)
EOSINOPHIL NFR BLD: 2.4 % (ref 0–8)
ERYTHROCYTE [DISTWIDTH] IN BLOOD BY AUTOMATED COUNT: 15.1 % (ref 11.5–14.5)
HCT VFR BLD AUTO: 33.3 % (ref 37–48.5)
HGB BLD-MCNC: 10.6 G/DL (ref 12–16)
IMM GRANULOCYTES # BLD AUTO: 0.05 K/UL (ref 0–0.04)
IMM GRANULOCYTES NFR BLD AUTO: 0.6 % (ref 0–0.5)
LYMPHOCYTES # BLD AUTO: 1.4 K/UL (ref 1–4.8)
LYMPHOCYTES NFR BLD: 17.5 % (ref 18–48)
MCH RBC QN AUTO: 30.5 PG (ref 27–31)
MCHC RBC AUTO-ENTMCNC: 31.8 G/DL (ref 32–36)
MCV RBC AUTO: 96 FL (ref 82–98)
MONOCYTES # BLD AUTO: 0.7 K/UL (ref 0.3–1)
MONOCYTES NFR BLD: 8.7 % (ref 4–15)
NEUTROPHILS # BLD AUTO: 5.4 K/UL (ref 1.8–7.7)
NEUTROPHILS NFR BLD: 69.4 % (ref 38–73)
NRBC BLD-RTO: 0 /100 WBC
PLATELET # BLD AUTO: 350 K/UL (ref 150–450)
PMV BLD AUTO: 10.6 FL (ref 9.2–12.9)
RBC # BLD AUTO: 3.47 M/UL (ref 4–5.4)
WBC # BLD AUTO: 7.82 K/UL (ref 3.9–12.7)

## 2022-05-23 PROCEDURE — 1101F PT FALLS ASSESS-DOCD LE1/YR: CPT | Mod: CPTII,S$GLB,, | Performed by: INTERNAL MEDICINE

## 2022-05-23 PROCEDURE — 3288F PR FALLS RISK ASSESSMENT DOCUMENTED: ICD-10-PCS | Mod: CPTII,S$GLB,, | Performed by: INTERNAL MEDICINE

## 2022-05-23 PROCEDURE — 1159F MED LIST DOCD IN RCRD: CPT | Mod: CPTII,S$GLB,, | Performed by: INTERNAL MEDICINE

## 2022-05-23 PROCEDURE — 3288F FALL RISK ASSESSMENT DOCD: CPT | Mod: CPTII,S$GLB,, | Performed by: INTERNAL MEDICINE

## 2022-05-23 PROCEDURE — 1101F PR PT FALLS ASSESS DOC 0-1 FALLS W/OUT INJ PAST YR: ICD-10-PCS | Mod: CPTII,S$GLB,, | Performed by: INTERNAL MEDICINE

## 2022-05-23 PROCEDURE — 3079F PR MOST RECENT DIASTOLIC BLOOD PRESSURE 80-89 MM HG: ICD-10-PCS | Mod: CPTII,S$GLB,, | Performed by: INTERNAL MEDICINE

## 2022-05-23 PROCEDURE — 99999 PR PBB SHADOW E&M-EST. PATIENT-LVL V: CPT | Mod: PBBFAC,,, | Performed by: INTERNAL MEDICINE

## 2022-05-23 PROCEDURE — 3079F DIAST BP 80-89 MM HG: CPT | Mod: CPTII,S$GLB,, | Performed by: INTERNAL MEDICINE

## 2022-05-23 PROCEDURE — 3077F PR MOST RECENT SYSTOLIC BLOOD PRESSURE >= 140 MM HG: ICD-10-PCS | Mod: CPTII,S$GLB,, | Performed by: INTERNAL MEDICINE

## 2022-05-23 PROCEDURE — 85025 COMPLETE CBC W/AUTO DIFF WBC: CPT | Mod: PN

## 2022-05-23 PROCEDURE — 99214 OFFICE O/P EST MOD 30 MIN: CPT | Mod: S$GLB,,, | Performed by: INTERNAL MEDICINE

## 2022-05-23 PROCEDURE — 99214 PR OFFICE/OUTPT VISIT, EST, LEVL IV, 30-39 MIN: ICD-10-PCS | Mod: S$GLB,,, | Performed by: INTERNAL MEDICINE

## 2022-05-23 PROCEDURE — 1160F RVW MEDS BY RX/DR IN RCRD: CPT | Mod: CPTII,S$GLB,, | Performed by: INTERNAL MEDICINE

## 2022-05-23 PROCEDURE — 1160F PR REVIEW ALL MEDS BY PRESCRIBER/CLIN PHARMACIST DOCUMENTED: ICD-10-PCS | Mod: CPTII,S$GLB,, | Performed by: INTERNAL MEDICINE

## 2022-05-23 PROCEDURE — 1159F PR MEDICATION LIST DOCUMENTED IN MEDICAL RECORD: ICD-10-PCS | Mod: CPTII,S$GLB,, | Performed by: INTERNAL MEDICINE

## 2022-05-23 PROCEDURE — 99999 PR PBB SHADOW E&M-EST. PATIENT-LVL V: ICD-10-PCS | Mod: PBBFAC,,, | Performed by: INTERNAL MEDICINE

## 2022-05-23 PROCEDURE — 36415 COLL VENOUS BLD VENIPUNCTURE: CPT | Mod: PN

## 2022-05-23 PROCEDURE — 1126F PR PAIN SEVERITY QUANTIFIED, NO PAIN PRESENT: ICD-10-PCS | Mod: CPTII,S$GLB,, | Performed by: INTERNAL MEDICINE

## 2022-05-23 PROCEDURE — 3077F SYST BP >= 140 MM HG: CPT | Mod: CPTII,S$GLB,, | Performed by: INTERNAL MEDICINE

## 2022-05-23 PROCEDURE — 1126F AMNT PAIN NOTED NONE PRSNT: CPT | Mod: CPTII,S$GLB,, | Performed by: INTERNAL MEDICINE

## 2022-05-23 RX ORDER — DEXAMETHASONE 4 MG/1
4 TABLET ORAL
Status: CANCELLED | OUTPATIENT
Start: 2022-05-24

## 2022-05-23 RX ORDER — SODIUM CHLORIDE 0.9 % (FLUSH) 0.9 %
10 SYRINGE (ML) INJECTION
Status: CANCELLED | OUTPATIENT
Start: 2022-05-24

## 2022-05-23 RX ORDER — HEPARIN 100 UNIT/ML
500 SYRINGE INTRAVENOUS
Status: CANCELLED | OUTPATIENT
Start: 2022-05-24

## 2022-05-23 NOTE — PROGRESS NOTES
History of present illness:  The patient is a 76-year-old white female recently discovered to have pleural mesothelioma.  Patient began experiencing difficulties with shortness of breath last fall.  She was eventually discovered to have pleural effusion and had a drain.  However recurred.  Patient eventually underwent pleural biopsy which returned positive for malignant mesothelioma.  She also had PleurX catheter placed to assist with periodic drainage of pleural fluid.  She drains every 2 days and obtains anywhere from 150-200 cc per drain.  Patient has some difficulties with thoracic discomfort and has hydrocodone 7.5-325 on hand.  She takes this only at night.  She denies weight loss, productive cough, hemoptysis, or worsening dyspnea.  She has been seen and evaluated by Dr. Novak and is scheduled to undergo staging laparoscopy to determine whether not she has spread of disease across the diaphragm into the abdomen.  Consult has been requested for assistance with use of neoadjuvant or palliative chemotherapy.     Since initial office consultation, patient is undergone exploratory laparotomy.  She was found not to have any diaphragmatic involvement or intra-abdominal disease in association with her diagnosis of malignant mesothelioma.  She returns to clinic to review interval studies in preparation for neoadjuvant chemotherapy.  MediPort catheter has been placed within the patient's right chest.     Patient returns to clinic for re-evaluation following 4 cycles of cisplatin/Alimta. Review of imaging and discussions in tumor board following completion of neoadjuvant therapy were for maintenance Alimta as opposed to surgical intervention.  However, patient's performance status was too poor to allow for such and she was observed until performance status would allow for therapy.  She has completed 1 cycle of maintenance Alimta and returns to clinic for toxicity assessment part way through her cycle.  Patient  reports increasing fatigue and disequilibrium.  She also has intermittent, evening, postprandial nausea emesis.  She is experiencing worsening dyspnea on exertion.  Pleural catheter drainage is nil.    Patient returns to clinic to review interval lab prior to initiation of cycle 4 of Keytruda.  Patient, unfortunately, failed therapy with Keytruda and has been transitioned to single agent Gemzar.  She returns to clinic for evaluation prior to cycle 4, day 8 of therapy.  Patient has had no additional symptoms of TIA.  Patient has yet to be evaluated by Neurology.  Appointment has been scheduled for 04/19/2022.    Patient has failed therapy with Gemzar and is now receiving single agent Navelbine.  She returns to clinic for cycle 3, day 1 of therapy.  Patient had some increasing pain in the right hemithorax over the last 2 weeks.  She took hydrocodone for this and was extremely sleepy on the medication.  Alternatively, she has begun to use nonsteroidal anti-inflammatories with good relief.      Physical examination:  Well-developed, well-nourished, elderly, white female, no acute distress, who has a weight of 119.5 lb (decreased by 2 lb).  VITAL SIGNS: Documented  and reviewed this visit.  HEENT: Normocephalic, atraumatic. Oral mucosa pink and moist. Lips without lesions. Tongue midline. Oropharynx clear. Nonicteric sclerae.  Tympanic membranes are clear.  NECK: Supple, no adenopathy. No carotid bruits, thyromegaly or thyroid nodule.   HEART: Regular rate and rhythm without murmur, gallop or rub.   LUNGS:  Absent breath sounds over the right hemithorax.  Otherwise, clear to auscultation with normal respiratory effort.    ABDOMEN: Soft, nontender, nondistended with positive normoactive bowel sounds, no hepatosplenomegaly.   EXTREMITIES: No cyanosis, clubbing or edema. Distal pulses are intact.   AXILLAE AND GROIN: No palpable pathologic lymphadenopathy is appreciated.   SKIN: Intact/turgor normal.  MediPort catheter  present within the right chest.  Surgical wound is well approximated and healed.  It is free from signs of infection.    NEUROLOGIC: Cranial nerves II-XII grossly intact. Motor: Good muscle bulk and tone. Strength/sensory 5/5 throughout. Gait unstable/ambulating with the assistance of a cane.     Laboratory:  White count 7.8, hemoglobin 10.6, hematocrit 33.3, platelets 350, absolute neutrophil count is 5400.     Guardant 360 panel:  Remarkable for TP53 mutation and an EGFR mutation of uncertain clinical significance.  Tumor was not MSI high.    Impression:  1. Malignant pleural mesothelioma with disease progression on Gemzar.    2. Therapy associated fatigue malaise.  3. Depression/anxiety around issues of health.    4. Esophagitis - S/P dilatation and biopsy.  5. Mild chemotherapy associated anemia not in need intervention.  6. Stage 3 chronic kidney disease.  7. Transient ischemic attack.    Plan:  1. Proceed with cycle 3, day 15 dose of Navelbine.  2. Patient is to return to clinic in 2 weeks with CBC CMP, LDH, magnesium, TSH, free T4, and CT PET scan for restaging.    Advance Care Planning During this visit, I engaged the patient in the advance care planning process.  The patient and I reviewed the role for advance directives and their purpose in directing future healthcare if the patients unable to speak for him/herself.  At this point in time, the patient does have full decision-making capacity.  We discussed different extreme health states that they could experience, and reviewed what kind of medical care the patient would want in those situations.  The patient communicated that if they were comatose and had little chance of a meaningful recovery, they would not want machines/life-sustaining treatments used.  In addition to the above preference, other important end-of-life issues for the patient include DNR.  The patient has  completed a living will to reflect these preferences.  The patient has already  designated a healthcare power of  (her ) to make decisions on their behalf.    30 minutes time spent.  This note was created using voice recognition software and may contain grammatical errors.

## 2022-05-24 ENCOUNTER — INFUSION (OUTPATIENT)
Dept: INFUSION THERAPY | Facility: HOSPITAL | Age: 77
End: 2022-05-24
Attending: INTERNAL MEDICINE
Payer: MEDICARE

## 2022-05-24 VITALS
HEART RATE: 74 BPM | SYSTOLIC BLOOD PRESSURE: 160 MMHG | RESPIRATION RATE: 16 BRPM | DIASTOLIC BLOOD PRESSURE: 72 MMHG | TEMPERATURE: 98 F

## 2022-05-24 DIAGNOSIS — C45.0 MALIGNANT MESOTHELIOMA OF PLEURA: Primary | ICD-10-CM

## 2022-05-24 PROCEDURE — 25000003 PHARM REV CODE 250: Mod: PN | Performed by: INTERNAL MEDICINE

## 2022-05-24 PROCEDURE — A4216 STERILE WATER/SALINE, 10 ML: HCPCS | Mod: PN | Performed by: INTERNAL MEDICINE

## 2022-05-24 PROCEDURE — 63600175 PHARM REV CODE 636 W HCPCS: Mod: PN | Performed by: INTERNAL MEDICINE

## 2022-05-24 PROCEDURE — 96409 CHEMO IV PUSH SNGL DRUG: CPT | Mod: PN

## 2022-05-24 RX ORDER — HEPARIN 100 UNIT/ML
500 SYRINGE INTRAVENOUS
Status: DISCONTINUED | OUTPATIENT
Start: 2022-05-24 | End: 2022-05-24 | Stop reason: HOSPADM

## 2022-05-24 RX ORDER — SODIUM CHLORIDE 0.9 % (FLUSH) 0.9 %
10 SYRINGE (ML) INJECTION
Status: DISCONTINUED | OUTPATIENT
Start: 2022-05-24 | End: 2022-05-24 | Stop reason: HOSPADM

## 2022-05-24 RX ORDER — DEXAMETHASONE 4 MG/1
4 TABLET ORAL
Status: COMPLETED | OUTPATIENT
Start: 2022-05-24 | End: 2022-05-24

## 2022-05-24 RX ADMIN — VINORELBINE 40 MG: 10 INJECTION, SOLUTION INTRAVENOUS at 03:05

## 2022-05-24 RX ADMIN — DEXAMETHASONE 4 MG: 4 TABLET ORAL at 02:05

## 2022-05-24 RX ADMIN — Medication 10 ML: at 02:05

## 2022-05-24 RX ADMIN — SODIUM CHLORIDE: 0.9 INJECTION, SOLUTION INTRAVENOUS at 02:05

## 2022-05-24 NOTE — PLAN OF CARE
Problem: Adult Inpatient Plan of Care  Goal: Plan of Care Review  Outcome: Ongoing, Progressing     Problem: Fatigue  Goal: Improved Activity Tolerance  Outcome: Ongoing, Progressing

## 2022-06-03 ENCOUNTER — HOSPITAL ENCOUNTER (OUTPATIENT)
Dept: RADIOLOGY | Facility: HOSPITAL | Age: 77
Discharge: HOME | End: 2022-06-03
Attending: INTERNAL MEDICINE
Payer: MEDICARE

## 2022-06-03 DIAGNOSIS — C45.0 MALIGNANT MESOTHELIOMA OF PLEURA: ICD-10-CM

## 2022-06-03 LAB — GLUCOSE SERPL-MCNC: 90 MG/DL (ref 70–110)

## 2022-06-03 PROCEDURE — 78815 PET IMAGE W/CT SKULL-THIGH: CPT | Mod: 26,PS,, | Performed by: RADIOLOGY

## 2022-06-03 PROCEDURE — 78815 PET IMAGE W/CT SKULL-THIGH: CPT | Mod: TC,PN,PS

## 2022-06-03 PROCEDURE — 78815 NM PET CT ROUTINE: ICD-10-PCS | Mod: 26,PS,, | Performed by: RADIOLOGY

## 2022-06-03 NOTE — PROGRESS NOTES
PET Imaging Questionnaire    1. Are you a Diabetic? Recent Blood Sugar level? No    2. Are you anemic? Bone Marrow Stimulation Meds? Yes    3. Have you had a CT Scan, if so when & where was your last one? Yes -     4. Have you had a PET Scan, if so when & where was your last one? Yes -     5. Chemotherapy or currently on Chemotherapy? Yes    6. Radiation therapy? Yes    Surgical History:   Past Surgical History:   Procedure Laterality Date    APPENDECTOMY      BACK SURGERY      BREAST LUMPECTOMY Right     COSMETIC SURGERY      DIAGNOSTIC LAPAROSCOPY N/A 2/18/2021    Procedure: LAPAROSCOPY, DIAGNOSTIC;  Surgeon: Aravind Novak MD;  Location: [facility] OR 2ND FLR;  Service: Colon and Rectal;  Laterality: N/A;    ESOPHAGEAL DILATION N/A 5/20/2021    Procedure: DILATION, ESOPHAGUS;  Surgeon: HO Machuca MD;  Location: [facility] ENDO;  Service: Endoscopy;  Laterality: N/A;    ESOPHAGOGASTRODUODENOSCOPY N/A 5/20/2021    Procedure: EGD (ESOPHAGOGASTRODUODENOSCOPY);  Surgeon: HO Machuca MD;  Location: [facility] ENDO;  Service: Endoscopy;  Laterality: N/A;    HYSTERECTOMY      INSERTION OF SUBCUTANEOUS PORT      INSERTION OF TUNNELED CENTRAL VENOUS CATHETER (CVC) WITH SUBCUTANEOUS PORT  2/18/2021    Procedure: INSERTION, PORT-A-CATH;  Surgeon: Aravind Novak MD;  [facility];  Service: Colon and Rectal;;    SPINE SURGERY      THORACENTESIS Right 12/28/2020    [place]    THORACENTESIS Right 01/24/2021    [facility]    TUBE THORACOTOMY Right 2/1/2021    Procedure: INSERTION, CATHETER, INTERCOSTAL, FOR DRAINAGE;  Surgeon: Esdras Jarrett MD;  Location: [facility] OR;  Service: Cardiothoracic;  Laterality: Right;    VIDEO-ASSISTED THORACOSCOPIC SURGERY (VATS) Right 1/27/2021    Procedure: VATS (VIDEO-ASSISTED THORACOSCOPIC SURGERY) - Pleural biopsy ;  Surgeon: Esdras Jarrett MD;  Location: [facility] OR;  Service: Cardiovascular;  Laterality: Right;   7.      8. Have you been fasting for at  least 6 hours? Yes    9. Is there any chance you may be pregnant or breastfeeding? No    Assay: 14.7 MCi@:10.26   Injection Site:lt arm     Residual: 1.419 mCi@: 10.28   Technologist: Daksha Shaw Injected:13.28mCi

## 2022-06-06 ENCOUNTER — LAB VISIT (OUTPATIENT)
Dept: LAB | Facility: HOSPITAL | Age: 77
End: 2022-06-06
Attending: INTERNAL MEDICINE
Payer: MEDICARE

## 2022-06-06 ENCOUNTER — OFFICE VISIT (OUTPATIENT)
Dept: HEMATOLOGY/ONCOLOGY | Facility: CLINIC | Age: 77
End: 2022-06-06
Payer: MEDICARE

## 2022-06-06 VITALS
RESPIRATION RATE: 16 BRPM | OXYGEN SATURATION: 98 % | WEIGHT: 121.5 LBS | SYSTOLIC BLOOD PRESSURE: 138 MMHG | DIASTOLIC BLOOD PRESSURE: 68 MMHG | HEIGHT: 65 IN | TEMPERATURE: 98 F | BODY MASS INDEX: 20.24 KG/M2 | HEART RATE: 103 BPM

## 2022-06-06 DIAGNOSIS — C45.0 MALIGNANT MESOTHELIOMA OF PLEURA: ICD-10-CM

## 2022-06-06 DIAGNOSIS — R63.0 APPETITE LOSS: Primary | ICD-10-CM

## 2022-06-06 LAB
ALBUMIN SERPL BCP-MCNC: 3.1 G/DL (ref 3.5–5.2)
ALP SERPL-CCNC: 138 U/L (ref 55–135)
ALT SERPL W/O P-5'-P-CCNC: 18 U/L (ref 10–44)
ANION GAP SERPL CALC-SCNC: 11 MMOL/L (ref 8–16)
AST SERPL-CCNC: 18 U/L (ref 10–40)
BASOPHILS # BLD AUTO: 0.08 K/UL (ref 0–0.2)
BASOPHILS NFR BLD: 1.1 % (ref 0–1.9)
BILIRUB SERPL-MCNC: 0.3 MG/DL (ref 0.1–1)
BUN SERPL-MCNC: 19 MG/DL (ref 8–23)
CALCIUM SERPL-MCNC: 9.9 MG/DL (ref 8.7–10.5)
CHLORIDE SERPL-SCNC: 99 MMOL/L (ref 95–110)
CO2 SERPL-SCNC: 26 MMOL/L (ref 23–29)
CREAT SERPL-MCNC: 1.1 MG/DL (ref 0.5–1.4)
DIFFERENTIAL METHOD: ABNORMAL
EOSINOPHIL # BLD AUTO: 0.2 K/UL (ref 0–0.5)
EOSINOPHIL NFR BLD: 2.1 % (ref 0–8)
ERYTHROCYTE [DISTWIDTH] IN BLOOD BY AUTOMATED COUNT: 15.7 % (ref 11.5–14.5)
EST. GFR  (AFRICAN AMERICAN): 57 ML/MIN/1.73 M^2
EST. GFR  (NON AFRICAN AMERICAN): 49 ML/MIN/1.73 M^2
GLUCOSE SERPL-MCNC: 125 MG/DL (ref 70–110)
HCT VFR BLD AUTO: 35.9 % (ref 37–48.5)
HGB BLD-MCNC: 11.4 G/DL (ref 12–16)
IMM GRANULOCYTES # BLD AUTO: 0.02 K/UL (ref 0–0.04)
IMM GRANULOCYTES NFR BLD AUTO: 0.3 % (ref 0–0.5)
LYMPHOCYTES # BLD AUTO: 1.5 K/UL (ref 1–4.8)
LYMPHOCYTES NFR BLD: 20 % (ref 18–48)
MCH RBC QN AUTO: 30.2 PG (ref 27–31)
MCHC RBC AUTO-ENTMCNC: 31.8 G/DL (ref 32–36)
MCV RBC AUTO: 95 FL (ref 82–98)
MONOCYTES # BLD AUTO: 1.6 K/UL (ref 0.3–1)
MONOCYTES NFR BLD: 20.8 % (ref 4–15)
NEUTROPHILS # BLD AUTO: 4.2 K/UL (ref 1.8–7.7)
NEUTROPHILS NFR BLD: 55.7 % (ref 38–73)
NRBC BLD-RTO: 0 /100 WBC
PLATELET # BLD AUTO: 396 K/UL (ref 150–450)
PMV BLD AUTO: 9.8 FL (ref 9.2–12.9)
POTASSIUM SERPL-SCNC: 4.6 MMOL/L (ref 3.5–5.1)
PROT SERPL-MCNC: 7.6 G/DL (ref 6–8.4)
RBC # BLD AUTO: 3.78 M/UL (ref 4–5.4)
SODIUM SERPL-SCNC: 136 MMOL/L (ref 136–145)
WBC # BLD AUTO: 7.59 K/UL (ref 3.9–12.7)

## 2022-06-06 PROCEDURE — 3288F PR FALLS RISK ASSESSMENT DOCUMENTED: ICD-10-PCS | Mod: CPTII,S$GLB,, | Performed by: INTERNAL MEDICINE

## 2022-06-06 PROCEDURE — 3078F PR MOST RECENT DIASTOLIC BLOOD PRESSURE < 80 MM HG: ICD-10-PCS | Mod: CPTII,S$GLB,, | Performed by: INTERNAL MEDICINE

## 2022-06-06 PROCEDURE — 36415 COLL VENOUS BLD VENIPUNCTURE: CPT | Mod: PN

## 2022-06-06 PROCEDURE — 1160F RVW MEDS BY RX/DR IN RCRD: CPT | Mod: CPTII,S$GLB,, | Performed by: INTERNAL MEDICINE

## 2022-06-06 PROCEDURE — 80053 COMPREHEN METABOLIC PANEL: CPT | Mod: PN

## 2022-06-06 PROCEDURE — 1159F PR MEDICATION LIST DOCUMENTED IN MEDICAL RECORD: ICD-10-PCS | Mod: CPTII,S$GLB,, | Performed by: INTERNAL MEDICINE

## 2022-06-06 PROCEDURE — 3075F SYST BP GE 130 - 139MM HG: CPT | Mod: CPTII,S$GLB,, | Performed by: INTERNAL MEDICINE

## 2022-06-06 PROCEDURE — 1125F AMNT PAIN NOTED PAIN PRSNT: CPT | Mod: CPTII,S$GLB,, | Performed by: INTERNAL MEDICINE

## 2022-06-06 PROCEDURE — 99999 PR PBB SHADOW E&M-EST. PATIENT-LVL V: ICD-10-PCS | Mod: PBBFAC,,, | Performed by: INTERNAL MEDICINE

## 2022-06-06 PROCEDURE — 1125F PR PAIN SEVERITY QUANTIFIED, PAIN PRESENT: ICD-10-PCS | Mod: CPTII,S$GLB,, | Performed by: INTERNAL MEDICINE

## 2022-06-06 PROCEDURE — 99999 PR PBB SHADOW E&M-EST. PATIENT-LVL V: CPT | Mod: PBBFAC,,, | Performed by: INTERNAL MEDICINE

## 2022-06-06 PROCEDURE — 1101F PR PT FALLS ASSESS DOC 0-1 FALLS W/OUT INJ PAST YR: ICD-10-PCS | Mod: CPTII,S$GLB,, | Performed by: INTERNAL MEDICINE

## 2022-06-06 PROCEDURE — 1159F MED LIST DOCD IN RCRD: CPT | Mod: CPTII,S$GLB,, | Performed by: INTERNAL MEDICINE

## 2022-06-06 PROCEDURE — 3075F PR MOST RECENT SYSTOLIC BLOOD PRESS GE 130-139MM HG: ICD-10-PCS | Mod: CPTII,S$GLB,, | Performed by: INTERNAL MEDICINE

## 2022-06-06 PROCEDURE — 1101F PT FALLS ASSESS-DOCD LE1/YR: CPT | Mod: CPTII,S$GLB,, | Performed by: INTERNAL MEDICINE

## 2022-06-06 PROCEDURE — 3078F DIAST BP <80 MM HG: CPT | Mod: CPTII,S$GLB,, | Performed by: INTERNAL MEDICINE

## 2022-06-06 PROCEDURE — 1160F PR REVIEW ALL MEDS BY PRESCRIBER/CLIN PHARMACIST DOCUMENTED: ICD-10-PCS | Mod: CPTII,S$GLB,, | Performed by: INTERNAL MEDICINE

## 2022-06-06 PROCEDURE — 3288F FALL RISK ASSESSMENT DOCD: CPT | Mod: CPTII,S$GLB,, | Performed by: INTERNAL MEDICINE

## 2022-06-06 PROCEDURE — 99215 PR OFFICE/OUTPT VISIT, EST, LEVL V, 40-54 MIN: ICD-10-PCS | Mod: S$GLB,,, | Performed by: INTERNAL MEDICINE

## 2022-06-06 PROCEDURE — 99215 OFFICE O/P EST HI 40 MIN: CPT | Mod: S$GLB,,, | Performed by: INTERNAL MEDICINE

## 2022-06-06 PROCEDURE — 85025 COMPLETE CBC W/AUTO DIFF WBC: CPT | Mod: PN | Performed by: INTERNAL MEDICINE

## 2022-06-06 RX ORDER — MEGESTROL ACETATE 40 MG/ML
200 SUSPENSION ORAL DAILY
Qty: 480 ML | Refills: 6 | Status: SHIPPED | OUTPATIENT
Start: 2022-06-06

## 2022-06-06 NOTE — PROGRESS NOTES
History of present illness:  The patient is a 76-year-old white female recently discovered to have pleural mesothelioma.  Patient began experiencing difficulties with shortness of breath last fall.  She was eventually discovered to have pleural effusion and had a drain.  However recurred.  Patient eventually underwent pleural biopsy which returned positive for malignant mesothelioma.  She also had PleurX catheter placed to assist with periodic drainage of pleural fluid.  She drains every 2 days and obtains anywhere from 150-200 cc per drain.  Patient has some difficulties with thoracic discomfort and has hydrocodone 7.5-325 on hand.  She takes this only at night.  She denies weight loss, productive cough, hemoptysis, or worsening dyspnea.  She has been seen and evaluated by Dr. Novak and is scheduled to undergo staging laparoscopy to determine whether not she has spread of disease across the diaphragm into the abdomen.  Consult has been requested for assistance with use of neoadjuvant or palliative chemotherapy.     Since initial office consultation, patient is undergone exploratory laparotomy.  She was found not to have any diaphragmatic involvement or intra-abdominal disease in association with her diagnosis of malignant mesothelioma.  She returns to clinic to review interval studies in preparation for neoadjuvant chemotherapy.  MediPort catheter has been placed within the patient's right chest.     Patient returns to clinic for re-evaluation following 4 cycles of cisplatin/Alimta. Review of imaging and discussions in tumor board following completion of neoadjuvant therapy were for maintenance Alimta as opposed to surgical intervention.  However, patient's performance status was too poor to allow for such and she was observed until performance status would allow for therapy.  She has completed 1 cycle of maintenance Alimta and returns to clinic for toxicity assessment part way through her cycle.  Patient  reports increasing fatigue and disequilibrium.  She also has intermittent, evening, postprandial nausea emesis.  She is experiencing worsening dyspnea on exertion.  Pleural catheter drainage is nil.    Patient returns to clinic to review interval lab prior to initiation of cycle 4 of Keytruda.  Patient, unfortunately, failed therapy with Keytruda and has been transitioned to single agent Gemzar.  She returns to clinic for evaluation prior to cycle 4, day 8 of therapy.  Patient has had no additional symptoms of TIA.  Patient has yet to be evaluated by Neurology.  Appointment has been scheduled for 04/19/2022.    Patient has failed therapy with Gemzar and is now receiving single agent Navelbine.  She returns to clinic for cycle 4, day 1 of therapy.  Patient has undergone interval laboratory and imaging in anticipation of today's appointment.      Physical examination:  Well-developed, well-nourished, elderly, white female, no acute distress, who has a weight of 121.5 lb (increased by 2 lb).  VITAL SIGNS: Documented  and reviewed this visit.  HEENT: Normocephalic, atraumatic. Oral mucosa pink and moist. Lips without lesions. Tongue midline. Oropharynx clear. Nonicteric sclerae.  Tympanic membranes are clear.  NECK: Supple, no adenopathy. No carotid bruits, thyromegaly or thyroid nodule.   HEART: Regular rate and rhythm without murmur, gallop or rub.   LUNGS:  Absent breath sounds over the right hemithorax.  Otherwise, clear to auscultation with normal respiratory effort.    ABDOMEN: Soft, nontender, nondistended with positive normoactive bowel sounds, no hepatosplenomegaly.   EXTREMITIES: No cyanosis, clubbing or edema. Distal pulses are intact.   AXILLAE AND GROIN: No palpable pathologic lymphadenopathy is appreciated.   SKIN: Intact/turgor normal.  MediPort catheter present within the right chest.  Surgical wound is well approximated and healed.  It is free from signs of infection.    NEUROLOGIC: Cranial nerves  II-XII grossly intact. Motor: Good muscle bulk and tone. Strength/sensory 5/5 throughout. Gait unstable/ambulating with the assistance of a cane.     Laboratory:  White count 5.4, hemoglobin 11, hematocrit 34.8, platelets 422, absolute neutrophil count is 3000.  Sodium 140, potassium 4.7, chloride 103, CO2 29, BUN 23, creatinine 1.1, glucose 106, calcium 10, magnesium 1.9, alkaline phosphatase 139, albumin 3.2, liver function test within normal limits, LDH is 186, GFR is 49.  TSH 0.992, free T4 0.97    CT PET:  Interval progression of disease in the right hemithorax.  Interval increase in hypermetabolic mediastinal adenopathy.     Hypermetabolic parotid nodules of uncertain etiology.  Benign and malignant salivary gland tumors, metastatic intraparotid nodes included in the differential.     Development of activity in left lateral rib 9, felt to be posttraumatic.    Guardant 360 panel:  Remarkable for TP53 mutation and an EGFR mutation of uncertain clinical significance.  Tumor was not MSI high.    Impression:  1. Malignant pleural mesothelioma with disease progression on Navelbine.    2. Therapy associated fatigue malaise.  3. Depression/anxiety around issues of health.    4. Esophagitis - S/P dilatation and biopsy.  5. Mild chemotherapy associated anemia not in need intervention.  6. Stage 3 chronic kidney disease.  7. Transient ischemic attack.    Plan:  1. Discontinue Navelbine.  2. Precision therapies referral to investigate for any available investigational agents for which the patient may qualify.    Advance Care Planning During this visit, I engaged the patient in the advance care planning process.  The patient and I reviewed the role for advance directives and their purpose in directing future healthcare if the patients unable to speak for him/herself.  At this point in time, the patient does have full decision-making capacity.  We discussed different extreme health states that they could experience, and  reviewed what kind of medical care the patient would want in those situations.  The patient communicated that if they were comatose and had little chance of a meaningful recovery, they would not want machines/life-sustaining treatments used.  In addition to the above preference, other important end-of-life issues for the patient include DNR.  The patient has  completed a living will to reflect these preferences.  The patient has already designated a healthcare power of  (her ) to make decisions on their behalf.    30 minutes time spent.    This note was created using voice recognition software and may contain grammatical errors.

## 2022-06-15 ENCOUNTER — TUMOR BOARD CONFERENCE (OUTPATIENT)
Dept: HEMATOLOGY/ONCOLOGY | Facility: CLINIC | Age: 77
End: 2022-06-15
Payer: MEDICARE

## 2022-06-15 NOTE — PROGRESS NOTES
[facility] Tumor Board    Date: 6/15/2022    Patient Name: Yoanna Thapa    MRN: [id number]    Diagnosis: Metastatic Mesothelioma -  Diagnosed in 1/2021    Referring Provider: Dr. Mcdaniel    Present PCTP Providers:     Dr. Joey Reyna, Dr. José Antonio Gonzalez, Dr. Zackary Skelton, Sarah Monterroso, NP, Valentina Han, SHAHRAM    Patient Summary:  · Pathology:    · Has failed Chemotherapy, Immunotherapy  Failed chemotherapy: 1. Cisplatin/Alimta (3/10/21-7/26/21) > maintenance Alimta. 2. Gemzar (12/09/21 to 02/24/2022) - progression. 4. Navelbine (3/15/22) - progression on 6/3 PET scan  Failed Immunotherapy: 2.Keytruda x4 cycles (09/09/21 to 11/10/21) - progression.    · Current treatment(s):    · ECOG: Restricted in physically strenuous activity but ambulatory and able to carry out work of a light or sedentary nature, e.g., light house work, office work    Molecular Workup:    Guardant 360  Guardant 360 Details: TP53 mutation and an EGFR mutation of uncertain clinical significance.  Tumor was not MSI high.      Board Recommendations:    Standard of care recommendations: Opdivo+Yervoy   Trial recommendations: No late or early phase trials.

## 2022-06-15 NOTE — Clinical Note
Dr. Mcdaniel,  Thank you for sending this patient for review at the [facility] Tumor Board. Unfortunately, we do not have a trial for this patient. We do however recommend Opdivo+yervoy as the next SOC option. Please let me know if you have any questions.   Thank You, Sarah Monterroso NP

## 2022-06-27 ENCOUNTER — TELEPHONE (OUTPATIENT)
Dept: VASCULAR SURGERY | Facility: CLINIC | Age: 77
End: 2022-06-27
Payer: MEDICARE

## 2022-06-27 NOTE — TELEPHONE ENCOUNTER
----- Message from Yael Moran sent at 6/27/2022  2:52 PM CDT -----  Contact: Self  Type:  Sooner Appointment Request    Caller is requesting a sooner appointment.  Caller declined first available appointment listed below.  Caller will not accept being placed on the waitlist and is requesting a message be sent to doctor.    Name of Caller:  Self  When is the first available appointment?  N/a, wouldn't allow me to schedule  Symptoms:  Placement right PleurX catheter, needs port removed, becoming uncomfortable, finished w. Treatment  Best Call Back Number:  [phone]  Additional Information:  Thank you

## 2022-06-27 NOTE — TELEPHONE ENCOUNTER
I contacted patient about port removal, she could not remember who put the port in. I reviewed her records and Dr Novak did the port 2/21. I told her that I would ask Dr Mcbride if he could remove it for her but she states she has other things to speak to Dr Novak about and she will ask him to remove it. I told her if anything changes that I would ask Dr Mcbride about removal

## 2022-07-07 ENCOUNTER — TELEPHONE (OUTPATIENT)
Dept: CARDIOTHORACIC SURGERY | Facility: CLINIC | Age: 77
End: 2022-07-07
Payer: MEDICARE

## 2022-07-07 NOTE — TELEPHONE ENCOUNTER
Called and spoke to patient regarding port removal. Patient scheduled for 7/19 at [facility] to discuss removal. Patient to call back with any additional questions or concerns. Patient verbalized understanding.

## 2022-07-07 NOTE — TELEPHONE ENCOUNTER
----- Message from Ana Mas sent at 7/7/2022 10:27 AM CDT -----  Type: Needs Medical Advice  Who Called:  Patient   Symptoms (please be specific):    How long has patient had these symptoms:    Pharmacy name and phone #:    Best Call Back Number: [phone]  Additional Information: Patient is requesting a call back to schedule an appt.

## 2022-07-19 ENCOUNTER — TELEPHONE (OUTPATIENT)
Dept: HEMATOLOGY/ONCOLOGY | Facility: CLINIC | Age: 77
End: 2022-07-19
Payer: MEDICARE

## 2022-07-19 ENCOUNTER — OFFICE VISIT (OUTPATIENT)
Dept: PULMONOLOGY | Facility: CLINIC | Age: 77
End: 2022-07-19
Payer: MEDICARE

## 2022-07-19 VITALS
OXYGEN SATURATION: 94 % | RESPIRATION RATE: 18 BRPM | TEMPERATURE: 97 F | HEIGHT: 65 IN | DIASTOLIC BLOOD PRESSURE: 85 MMHG | HEART RATE: 121 BPM | WEIGHT: 117.06 LBS | BODY MASS INDEX: 19.5 KG/M2 | SYSTOLIC BLOOD PRESSURE: 147 MMHG

## 2022-07-19 DIAGNOSIS — R06.09 EXERTIONAL DYSPNEA: ICD-10-CM

## 2022-07-19 DIAGNOSIS — R00.0 TACHYCARDIA: ICD-10-CM

## 2022-07-19 DIAGNOSIS — C45.9 MESOTHELIOMA, MALIGNANT: Primary | ICD-10-CM

## 2022-07-19 PROCEDURE — 1101F PT FALLS ASSESS-DOCD LE1/YR: CPT | Mod: CPTII,S$GLB,, | Performed by: THORACIC SURGERY (CARDIOTHORACIC VASCULAR SURGERY)

## 2022-07-19 PROCEDURE — 3288F PR FALLS RISK ASSESSMENT DOCUMENTED: ICD-10-PCS | Mod: CPTII,S$GLB,, | Performed by: THORACIC SURGERY (CARDIOTHORACIC VASCULAR SURGERY)

## 2022-07-19 PROCEDURE — 3077F PR MOST RECENT SYSTOLIC BLOOD PRESSURE >= 140 MM HG: ICD-10-PCS | Mod: CPTII,S$GLB,, | Performed by: THORACIC SURGERY (CARDIOTHORACIC VASCULAR SURGERY)

## 2022-07-19 PROCEDURE — 3077F SYST BP >= 140 MM HG: CPT | Mod: CPTII,S$GLB,, | Performed by: THORACIC SURGERY (CARDIOTHORACIC VASCULAR SURGERY)

## 2022-07-19 PROCEDURE — 1125F AMNT PAIN NOTED PAIN PRSNT: CPT | Mod: CPTII,S$GLB,, | Performed by: THORACIC SURGERY (CARDIOTHORACIC VASCULAR SURGERY)

## 2022-07-19 PROCEDURE — 3288F FALL RISK ASSESSMENT DOCD: CPT | Mod: CPTII,S$GLB,, | Performed by: THORACIC SURGERY (CARDIOTHORACIC VASCULAR SURGERY)

## 2022-07-19 PROCEDURE — 99213 PR OFFICE/OUTPT VISIT, EST, LEVL III, 20-29 MIN: ICD-10-PCS | Mod: S$GLB,,, | Performed by: THORACIC SURGERY (CARDIOTHORACIC VASCULAR SURGERY)

## 2022-07-19 PROCEDURE — 99999 PR PBB SHADOW E&M-EST. PATIENT-LVL IV: ICD-10-PCS | Mod: PBBFAC,,, | Performed by: THORACIC SURGERY (CARDIOTHORACIC VASCULAR SURGERY)

## 2022-07-19 PROCEDURE — 1159F PR MEDICATION LIST DOCUMENTED IN MEDICAL RECORD: ICD-10-PCS | Mod: CPTII,S$GLB,, | Performed by: THORACIC SURGERY (CARDIOTHORACIC VASCULAR SURGERY)

## 2022-07-19 PROCEDURE — 99213 OFFICE O/P EST LOW 20 MIN: CPT | Mod: S$GLB,,, | Performed by: THORACIC SURGERY (CARDIOTHORACIC VASCULAR SURGERY)

## 2022-07-19 PROCEDURE — 3079F PR MOST RECENT DIASTOLIC BLOOD PRESSURE 80-89 MM HG: ICD-10-PCS | Mod: CPTII,S$GLB,, | Performed by: THORACIC SURGERY (CARDIOTHORACIC VASCULAR SURGERY)

## 2022-07-19 PROCEDURE — 1159F MED LIST DOCD IN RCRD: CPT | Mod: CPTII,S$GLB,, | Performed by: THORACIC SURGERY (CARDIOTHORACIC VASCULAR SURGERY)

## 2022-07-19 PROCEDURE — 1125F PR PAIN SEVERITY QUANTIFIED, PAIN PRESENT: ICD-10-PCS | Mod: CPTII,S$GLB,, | Performed by: THORACIC SURGERY (CARDIOTHORACIC VASCULAR SURGERY)

## 2022-07-19 PROCEDURE — 99999 PR PBB SHADOW E&M-EST. PATIENT-LVL IV: CPT | Mod: PBBFAC,,, | Performed by: THORACIC SURGERY (CARDIOTHORACIC VASCULAR SURGERY)

## 2022-07-19 PROCEDURE — 1101F PR PT FALLS ASSESS DOC 0-1 FALLS W/OUT INJ PAST YR: ICD-10-PCS | Mod: CPTII,S$GLB,, | Performed by: THORACIC SURGERY (CARDIOTHORACIC VASCULAR SURGERY)

## 2022-07-19 PROCEDURE — 3079F DIAST BP 80-89 MM HG: CPT | Mod: CPTII,S$GLB,, | Performed by: THORACIC SURGERY (CARDIOTHORACIC VASCULAR SURGERY)

## 2022-07-19 RX ORDER — CHOLECALCIFEROL (VITAMIN D3) 25 MCG
1 TABLET,CHEWABLE ORAL DAILY
COMMUNITY

## 2022-07-19 NOTE — TELEPHONE ENCOUNTER
----- Message from Cecille Kessler sent at 7/19/2022 12:04 PM CDT -----  Type: Patient Call Back         Who called: Pt's Daughter Mikayla          What is the request in detail: Pt daughter called in regarding a few questions and concerns . Pt is in the ER at this time          Can the clinic reply by [facility]?no         Would the patient rather a call back or a response via [facility]?call back          Best call back number:[phone] (mobile)  Mikayla          Additional Information:           Thank You

## 2022-07-19 NOTE — PROGRESS NOTES
Patient ID: Yoanna Thapa is a 76 y.o. female.    Chief Complaint: Epithelioid mesothelioma (Complains of pain when inhaling, weak and can barely walk, trouble breathing, sleeping a lot and feels that when laying down it helps, occasional bother from port.)      HPI:  History of present illness:  The patient is a 76-year-old white female with pleural epithelioid mesothelioma.  She began experiencing difficulties with persistent cough and shortness of breath in Fall 2020.  She was eventually discovered to have pleural effusion. A VATS with pleural biopsy was performed in January 2021 with right PleurX catheter placement in february and subsequent staging diagnostic laparoscopy. She completed 4 cycles of cisplatin/alimta however she had difficulty tolerating secondary to profound fatigue.  SHe was presented in patient Care Conference and determined not to be a candidate for cytoreductive therapy. She has been on maintenance Alimta but has radiographic evidence of persistent disease/disease progression.       Review of Systems   Constitutional: Positive for appetite change, fatigue and unexpected weight change.        Progressive fatigue with patient sleeping several hours during daylight     Respiratory: Positive for cough, chest tightness and shortness of breath.    Gastrointestinal: Negative.    Skin: Negative.    Neurological: Negative.    Psychiatric/Behavioral: Negative.        Current Outpatient Medications   Medication Sig Dispense Refill    acetaminophen (TYLENOL) 500 MG tablet Take 1,000 mg by mouth every 6 (six) hours as needed for Pain.      cyanocobalamin, vitamin B-12, 3,000 mcg Cap Take 1 capsule by mouth once daily at 6am.      docusate sodium (COLACE) 100 MG capsule Take 100 mg by mouth 2 (two) times daily as needed for Constipation.      folic acid (FOLVITE) 1 MG tablet Take 1 tablet (1 mg total) by mouth once daily. 100 tablet 3    gabapentin (NEURONTIN) 300 MG capsule Take 1 capsule  (300 mg total) by mouth every evening. 30 capsule 5    HYDROcodone-acetaminophen (NORCO) 5-325 mg per tablet Take 1 tablet by mouth every 6 (six) hours as needed for Pain. 30 tablet 0    loratadine (CLARITIN) 10 mg tablet Take 1 tablet by mouth once daily.      losartan (COZAAR) 100 MG tablet Take 100 mg by mouth every morning.       ondansetron (ZOFRAN-ODT) 4 MG TbDL 4 mg every 6 (six) hours.      pantoprazole (PROTONIX) 40 MG tablet Take 1 tablet (40 mg total) by mouth every morning. 90 tablet 3    potassium chloride SA (K-DUR,KLOR-CON) 20 MEQ tablet Take 1 tablet (20 mEq total) by mouth once daily. 90 tablet 0    venlafaxine (EFFEXOR XR) 75 MG 24 hr capsule Take 2 capsules (150 mg total) by mouth once daily. 180 capsule 3    albuterol (PROVENTIL/VENTOLIN HFA) 90 mcg/actuation inhaler Inhale 2 puffs into the lungs every 6 (six) hours as needed for Wheezing. Rescue      ALPRAZolam (XANAX) 0.25 MG tablet Take 1 tablet (0.25 mg total) by mouth 3 (three) times daily as needed for Anxiety. 60 tablet 0    amLODIPine (NORVASC) 5 MG tablet Take 1 tablet (5 mg total) by mouth once daily. 30 tablet 1    ascorbic acid, vitamin C, (VITAMIN C) 500 MG tablet Take 500 mg by mouth every morning.      atorvastatin (LIPITOR) 10 MG tablet Take 10 mg by mouth every morning.       cyclobenzaprine (FLEXERIL) 10 MG tablet Take 10 mg by mouth daily as needed.       doxycycline (MONODOX) 100 MG capsule Take 1 capsule (100 mg total) by mouth 2 (two) times daily. (Patient not taking: Reported on 7/19/2022) 14 capsule 0    LIDOcaine-prilocaine (EMLA) cream Apply topically as needed. (Patient not taking: Reported on 7/19/2022) 30 g 2    megestroL (MEGACE) 400 mg/10 mL (40 mg/mL) Susp Take 5 mLs (200 mg total) by mouth once daily. (Patient not taking: Reported on 7/19/2022) 480 mL 6    multivitamin capsule Take 1 capsule by mouth once daily. (Patient not taking: Reported on 7/19/2022)      paroxetine (PAXIL) 20 MG tablet Take  1 tablet by mouth every morning.      triamcinolone acetonide 0.025% (KENALOG) 0.025 % cream Apply topically 2 (two) times daily.      triamcinolone acetonide 0.025% (KENALOG) 0.025 % Oint Apply topically 2 (two) times daily. Apply to affected area twice daily (Patient not taking: Reported on 7/19/2022) 1 Tube 0    water Liqd 150 mL with MILK OF MAGNESIA 400 mg/5 mL Susp 400 mg, diphenhydrAMINE 12.5 mg/5 mL Elix 60 mg, nystatin 100,000 unit/mL Susp 500,000 Units SWISH & SWALLOW 5 ML'S EVERY 4 HOURS AS NEEDED FOR MOUTH & THROAT PAIN       No current facility-administered medications for this visit.     Facility-Administered Medications Ordered in Other Visits   Medication Dose Route Frequency Provider Last Rate Last Admin    ceFAZolin injection 2 g  2 g Intravenous On Call Procedure RADHA Brown           Review of patient's allergies indicates:   Allergen Reactions    Iodine and iodide containing products Anaphylaxis and Swelling     Dye causes Blisters.  Pt tolerates shrimp and betadine.    Sulfa (sulfonamide antibiotics) Anaphylaxis and Hives    Adhesive Other (See Comments)     Blister    Blisters   Pt request paper tape.    Iodinated contrast media Blisters       Past Medical History:   Diagnosis Date    Anxiety     Breast cancer 2009    Right: Lumpectomy and XRT    Cancer 2009    breast    Cancer of pleura, secondary 1/27/2021    Depression     Disorder of kidney and ureter     GERD (gastroesophageal reflux disease)     Hyperlipidemia     Hypertension     Malignant pleural effusion 1/27/2021    Symptomatic anemia 6/15/2021       Past Surgical History:   Procedure Laterality Date    APPENDECTOMY      BACK SURGERY      BREAST LUMPECTOMY Right     COSMETIC SURGERY      DIAGNOSTIC LAPAROSCOPY N/A 2/18/2021    Procedure: LAPAROSCOPY, DIAGNOSTIC;  Surgeon: Aravind Novak MD;  Location: [facility] OR [address] [place] [address];  Service: Colon and Rectal;  Laterality: N/A;    ESOPHAGEAL DILATION N/A  5/20/2021    Procedure: DILATION, ESOPHAGUS;  Surgeon: HO Machuca MD;  Location: [facility] ENDO;  Service: Endoscopy;  Laterality: N/A;    ESOPHAGOGASTRODUODENOSCOPY N/A 5/20/2021    Procedure: EGD (ESOPHAGOGASTRODUODENOSCOPY);  Surgeon: HO Machuca MD;  Location: [facility] ENDO;  Service: Endoscopy;  Laterality: N/A;    HYSTERECTOMY      INSERTION OF SUBCUTANEOUS PORT      INSERTION OF TUNNELED CENTRAL VENOUS CATHETER (CVC) WITH SUBCUTANEOUS PORT  2/18/2021    Procedure: INSERTION, PORT-A-CATH;  Surgeon: Aravind Novak MD;  [facility];  Service: Colon and Rectal;;    SPINE SURGERY      THORACENTESIS Right 12/28/2020    [place]    THORACENTESIS Right 01/24/2021    [facility]    TUBE THORACOTOMY Right 2/1/2021    Procedure: INSERTION, CATHETER, INTERCOSTAL, FOR DRAINAGE;  Surgeon: Esdras Jarrett MD;  Location: [facility] OR;  Service: Cardiothoracic;  Laterality: Right;    VIDEO-ASSISTED THORACOSCOPIC SURGERY (VATS) Right 1/27/2021    Procedure: VATS (VIDEO-ASSISTED THORACOSCOPIC SURGERY) - Pleural biopsy ;  Surgeon: Esdras Jarrett MD;  Location: [facility] OR;  Service: Cardiovascular;  Laterality: Right;       No family history on file.    Social History     Socioeconomic History    Marital status:     Number of children: 1   Occupational History    Occupation: retired   Tobacco Use    Smoking status: Never Smoker    Smokeless tobacco: Never Used   Substance and Sexual Activity    Alcohol use: Not Currently     Comment: occasionally    Drug use: Never    Sexual activity: Yes     Partners: Male       Vitals:    07/19/22 0941   BP: (!) 147/85   Pulse: (!) 121   Resp: 18   Temp: 97 °F (36.1 °C)       Physical Exam  Eyes:      Extraocular Movements: Extraocular movements intact.      Conjunctiva/sclera: Conjunctivae normal.      Pupils: Pupils are equal, round, and reactive to light.   Cardiovascular:      Rate and Rhythm: Tachycardia present.      Pulses: Normal pulses.       Comments: Sinus tachycardia in 120 range  Pulmonary:      Effort: Respiratory distress present.      Comments: Diminished BS right chest  Labored breathing  Abdominal:      Palpations: Abdomen is soft.   Musculoskeletal:         General: Normal range of motion.      Cervical back: Normal range of motion.   Skin:     General: Skin is warm and dry.      Capillary Refill: Capillary refill takes less than 2 seconds.   Neurological:      General: No focal deficit present.      Mental Status: She is alert and oriented to person, place, and time.   Psychiatric:         Mood and Affect: Mood normal.         Behavior: Behavior normal.         Thought Content: Thought content normal.         Judgment: Judgment normal.         Assessment & Plan:  Epithelioid pleural malignant mesothelioma refractory to Alimta maintenance therapy  Progressive dyspnea  Tachycardia  Possible underlying PE given cancer history, stable BP and dyspnea    Referral to ED for PE workup.  V/Q scan may be only option given iodine allergy.  Consider premedication and performing contrast-enhanced chest CT  Recommend Palliative Care Medicine consult as patient indicated that she doesn't want to be enrolled in a clinical trial

## 2022-07-19 NOTE — NURSING
Patient visit with Dr. Novak today. After his assessment, patient was advised to report to ER for eval of tachycardia and R/O PE.  Daughter present and will drive her to ER

## 2022-07-20 ENCOUNTER — TELEPHONE (OUTPATIENT)
Dept: HEMATOLOGY/ONCOLOGY | Facility: CLINIC | Age: 77
End: 2022-07-20
Payer: MEDICARE

## 2022-07-20 PROBLEM — J96.11 HYPOXEMIC RESPIRATORY FAILURE, CHRONIC: Status: ACTIVE | Noted: 2022-07-20

## 2022-07-20 NOTE — TELEPHONE ENCOUNTER
Spoke to patient and daughter after ER discharge yesterday. Patient asking questions about palliative care vs hospice. Advised on options for both and they will talk over with family and call me back to discuss which one they want to pursue.

## 2022-07-21 ENCOUNTER — TELEPHONE (OUTPATIENT)
Dept: HEMATOLOGY/ONCOLOGY | Facility: CLINIC | Age: 77
End: 2022-07-21
Payer: MEDICARE

## 2022-07-21 NOTE — TELEPHONE ENCOUNTER
Dr Mcdaniel advised patient is considering tx/ palliative care vs hospice and will let me know what she decides----- Message from Presley Mcdaniel MD sent at 7/20/2022  5:01 PM CDT -----  Precision therapies recommended opdivo+yervoy. No trials open.  She can come to discuss this vs. Travel to another center.  ----- Message -----  From: Ralf Denson MD  Sent: 7/20/2022  12:15 PM CDT  To: Presley Mcdaniel MD

## 2022-07-21 NOTE — TELEPHONE ENCOUNTER
Patient notified and will call back once decision is made on how she wants to proceed.  ----- Message from Presley Mcdaniel MD sent at 7/20/2022  5:01 PM CDT -----  Precision therapies recommended opdivo+yervoy. No trials open.  She can come to discuss this vs. Travel to another center.  ----- Message -----  From: Ralf Denson MD  Sent: 7/20/2022  12:15 PM CDT  To: Presley Mcdaniel MD

## 2022-07-25 ENCOUNTER — TELEPHONE (OUTPATIENT)
Dept: HEMATOLOGY/ONCOLOGY | Facility: CLINIC | Age: 77
End: 2022-07-25
Payer: MEDICARE

## 2022-07-25 DIAGNOSIS — C45.0 MALIGNANT MESOTHELIOMA OF PLEURA: Primary | ICD-10-CM

## 2022-07-25 NOTE — TELEPHONE ENCOUNTER
Patient called back to discuss that she has opted Hospice care. Advised I will notifiy Dr Mcdaniel and get this referral started for her today----- Message from Juan Frnak sent at 7/25/2022 10:57 AM CDT -----  Contact: pt at [phone]  Type: Needs Medical Advice  Who Called:  pt  Best Call Back Number: [phone]  Additional Information: pt is calling the office  requesting to speak to the nurse. Please call back and advise.
